# Patient Record
Sex: MALE | Race: WHITE | NOT HISPANIC OR LATINO | Employment: FULL TIME | URBAN - METROPOLITAN AREA
[De-identification: names, ages, dates, MRNs, and addresses within clinical notes are randomized per-mention and may not be internally consistent; named-entity substitution may affect disease eponyms.]

---

## 2017-01-02 ENCOUNTER — GENERIC CONVERSION - ENCOUNTER (OUTPATIENT)
Dept: OTHER | Facility: OTHER | Age: 47
End: 2017-01-02

## 2017-01-24 ENCOUNTER — ALLSCRIPTS OFFICE VISIT (OUTPATIENT)
Dept: OTHER | Facility: OTHER | Age: 47
End: 2017-01-24

## 2017-01-24 LAB
FLUAV AG SPEC QL IA: NEGATIVE
INFLUENZA B AG (HISTORICAL): NEGATIVE

## 2017-09-29 ENCOUNTER — ALLSCRIPTS OFFICE VISIT (OUTPATIENT)
Dept: OTHER | Facility: OTHER | Age: 47
End: 2017-09-29

## 2017-09-29 DIAGNOSIS — E29.1 TESTICULAR HYPOFUNCTION: ICD-10-CM

## 2017-10-02 ENCOUNTER — APPOINTMENT (OUTPATIENT)
Dept: LAB | Facility: CLINIC | Age: 47
End: 2017-10-02
Payer: COMMERCIAL

## 2017-10-02 ENCOUNTER — TRANSCRIBE ORDERS (OUTPATIENT)
Dept: LAB | Facility: CLINIC | Age: 47
End: 2017-10-02

## 2017-10-02 DIAGNOSIS — E29.1 TESTICULAR HYPOFUNCTION: ICD-10-CM

## 2017-10-02 LAB — TESTOST SERPL-MCNC: 306 NG/DL (ref 241–827)

## 2017-10-02 PROCEDURE — 84403 ASSAY OF TOTAL TESTOSTERONE: CPT

## 2017-10-02 PROCEDURE — 36415 COLL VENOUS BLD VENIPUNCTURE: CPT

## 2017-10-10 ENCOUNTER — GENERIC CONVERSION - ENCOUNTER (OUTPATIENT)
Dept: OTHER | Facility: OTHER | Age: 47
End: 2017-10-10

## 2017-11-21 ENCOUNTER — GENERIC CONVERSION - ENCOUNTER (OUTPATIENT)
Dept: OTHER | Facility: OTHER | Age: 47
End: 2017-11-21

## 2017-12-05 ENCOUNTER — GENERIC CONVERSION - ENCOUNTER (OUTPATIENT)
Dept: FAMILY MEDICINE CLINIC | Facility: CLINIC | Age: 47
End: 2017-12-05

## 2017-12-12 ENCOUNTER — ALLSCRIPTS OFFICE VISIT (OUTPATIENT)
Dept: OTHER | Facility: OTHER | Age: 47
End: 2017-12-12

## 2017-12-12 DIAGNOSIS — S39.012A STRAIN OF MUSCLE, FASCIA AND TENDON OF LOWER BACK, INITIAL ENCOUNTER: ICD-10-CM

## 2018-01-09 NOTE — RESULT NOTES
Message   b/w looks ok     Verified Results  (1) CBC/PLT/DIFF [id number] 02:57PM Gamaliel Olson Order Number: [id number]     Test Name Result Flag Reference   WBC COUNT 6 68 Thousand/uL  4 31-10 16   RBC COUNT 4 89 Million/uL  3 88-5 62   HEMOGLOBIN 14 9 g/dL  12 0-17 0   HEMATOCRIT 42 7 %  36 5-49 3   MCV 87 fL  82-98   MCH 30 5 pg  26 8-34 3   MCHC 34 9 g/dL  31 4-37 4   RDW 13 6 %  11 6-15 1   MPV 10 7 fL  8 9-12 7   PLATELET COUNT 917 Thousands/uL  149-390   nRBC AUTOMATED 0 /100 WBCs     NEUTROPHILS RELATIVE PERCENT 59 %  43-75   LYMPHOCYTES RELATIVE PERCENT 30 %  14-44   MONOCYTES RELATIVE PERCENT 8 %  4-12   EOSINOPHILS RELATIVE PERCENT 3 %  0-6   BASOPHILS RELATIVE PERCENT 0 %  0-1   NEUTROPHILS ABSOLUTE COUNT 3 91 Thousands/?L  1 85-7 62   LYMPHOCYTES ABSOLUTE COUNT 2 01 Thousands/?L  0 60-4 47   MONOCYTES ABSOLUTE COUNT 0 51 Thousand/?L  0 17-1 22   EOSINOPHILS ABSOLUTE COUNT 0 22 Thousand/?L  0 00-0 61   BASOPHILS ABSOLUTE COUNT 0 01 Thousands/?L  0 00-0 10   - Patient Instructions: This bloodwork is non-fasting  Please drink two glasses of water morning of bloodwork  (1) COMPREHENSIVE METABOLIC PANEL [id number] 25:10DQ Gamaliel Olson Order Number: [id number]     Test Name Result Flag Reference   GLUCOSE,RANDM 111 mg/dL     If the patient is fasting, the ADA then defines impaired fasting glucose as > 100 mg/dL and diabetes as > or equal to 123 mg/dL     SODIUM 142 mmol/L  136-145   POTASSIUM 4 0 mmol/L  3 5-5 3   CHLORIDE 105 mmol/L  100-108   CARBON DIOXIDE 28 mmol/L  21-32   ANION GAP (CALC) 9 mmol/L  4-13   BLOOD UREA NITROGEN 13 mg/dL  5-25   CREATININE 0 97 mg/dL  0 60-1 30   Standardized to IDMS reference method   CALCIUM 9 1 mg/dL  8 3-10 1   BILI, TOTAL 0 49 mg/dL  0 20-1 00   ALK PHOSPHATAS 81 U/L     ALT (SGPT) 37 U/L  12-78   AST(SGOT) 18 U/L  5-45   ALBUMIN 4 2 g/dL  3 5-5 0   TOTAL PROTEIN 7 6 g/dL  6 4-8 2   eGFR Non-      >60 0 ml/min/1 73sq [facility] Disease Education Program recommendations are as follows:  GFR calculation is accurate only with a steady state creatinine  Chronic Kidney disease less than 60 ml/min/1 73 sq  meters  Kidney failure less than 15 ml/min/1 73 sq  meters  (1) LIPID PANEL, FASTING 27Dec2016 02:57PM Yuri Shirley Order Number: [id number]     Test Name Result Flag Reference   CHOLESTEROL 177 mg/dL     HDL,DIRECT 48 mg/dL  40-60   Specimen collection should occur prior to Metamizole administration due to the potential for falsely depressed results  LDL CHOLESTEROL CALCULATED 92 mg/dL  0-100   - Patient Instructions: This is a fasting blood test  Water,black tea or black  coffee only after 9:00pm the night before test   Drink 2 glasses of water the morning of test       Triglyceride:         Normal              <150 mg/dl       Borderline High    150-199 mg/dl       High               200-499 mg/dl       Very High          >499 mg/dl  Cholesterol:         Desirable        <200 mg/dl      Borderline High  200-239 mg/dl      High             >239 mg/dl  HDL Cholesterol:        High    >59 mg/dL      Low     <41 mg/dL  LDL CALCULATED:    This screening LDL is a calculated result  It does not have the accuracy of the Direct Measured LDL in the monitoring of patients with hyperlipidemia and/or statin therapy  Direct Measure LDL (BKL166) must be ordered separately in these patients  TRIGLYCERIDES 184 mg/dL H <=150   Specimen collection should occur prior to N-Acetylcysteine or Metamizole administration due to the potential for falsely depressed results  (1) TSH WITH FT4 REFLEX 27Dec2016 02:57PM Yuri Shirley Order Number: [id number]     Test Name Result Flag Reference   TSH 0 793 uIU/mL  0 358-3 740   Patients undergoing fluorescein dye angiography may retain small amounts of fluorescein in the body for 48-72 hours post procedure   Samples containing fluorescein can produce falsely depressed TSH values  If the patient had this procedure,a specimen should be resubmitted post fluorescein clearance  (1) PSA (SCREEN) (Dx V76 44 Screen for Prostate Cancer) [id number] 02:57PM [facility] Order Number: [id number]     Test Name Result Flag Reference   PROSTATE SPECIFIC ANTIGEN 0 8 ng/mL  0 0-4 0   American Urological Association Guidelines define biochemical recurrence of prostate cancer as a detectable or rising PSA value post-radical prostatectomy that is greater than or equal to 0 2 ng/mL with a second confirmatory level of greater than or equal to 0 2 ng/mL  - Patient Instructions: This test is non-fasting  Please drink two glasses of water morning of bloodwork       (1) VITAMIN B12 [id number] 02:57PM Estrella Adorno Order Number: [id number]     Test Name Result Flag Reference   VITAMIN B12 674 pg/mL  100-900

## 2018-01-11 NOTE — RESULT NOTES
Message   xray and venous doppler look ok  Verified Results  VAS LOWER LIMB VENOUS DUPLEX STUDY, UNILATERAL/LIMITED [id number] 01:35PM Darinel FameCast Order Number: [id number]    - Patient Instructions: To schedule this appointment, please contact Central Scheduling at [phone]  Test Name Result Flag Reference   VAS LOWER LIMB VENOUS DUPLEX STUDY, UNILATERAL/LIMITED (Report)     [facility] REPORT   CLINICAL:   Indications: Left Limb Pain [M79 609]  Pt  complains of pain in his left outer   calf x 4-5 months that has become much worse over the past month  He has no   swelling  FINDINGS:      Left   Impression       CFV   Normal (Patent)             CONCLUSION:   Impression:   RIGHT LOWER LIMB LIMITED: NORMAL   Evaluation shows no evidence of thrombus in the common femoral vein  Doppler evaluation shows a normal response to augmentation maneuvers  LEFT LOWER LIMB: NORMAL   No evidence of acute or chronic deep vein thrombosis   No evidence of superficial thrombophlebitis noted  Doppler evaluation shows a normal response to augmentation maneuvers  Popliteal, posterior tibial and anterior tibial arterial Doppler waveforms are   triphasic  Tech Note: There is an echogenic structure located in the inguinal region  This   structure measures approximately 1 29cm and is consistent with enlarged lymph   node and channels  SIGNATURE:   Electronically Signed by: Mynor Olivas MD on 2016-12-28 05:40:44 PM     * XR TIBIA FIBULA 2 VIEW LEFT [id number] 12:08PM Darinel Winters Order Number: [id number]     Test Name Result Flag Reference   XR TIBIA FIBULA 2 VW LEFT (Report)     LEFT TIBIA AND FIBULA     INDICATION: Left tibia and fibula pain  COMPARISON: None     VIEWS: AP and lateral; 4 images     FINDINGS:     There is no acute fracture or dislocation  No degenerative changes  No lytic or blastic lesions are seen  Soft tissues are unremarkable  IMPRESSION:     No acute osseous abnormality  Workstation performed: [id number]     Signed by:   Romayne Cross Carnella Pipe, MD   12/29/16

## 2018-01-12 NOTE — MISCELLANEOUS
Message  Return to work or school:   Lotus Resendiz is under my professional care  He was seen in my office on 1/20/16   He is able to return to work on  1/20/2016      Patient off of work 1/19/2016 for appt  1/20/16  TI Samuels        Signatures   Electronically signed by : Mae Levi, ; Jan 20 2016 12:47PM EST                       (Author)

## 2018-01-13 VITALS
TEMPERATURE: 97.8 F | BODY MASS INDEX: 23.06 KG/M2 | WEIGHT: 174 LBS | HEIGHT: 73 IN | DIASTOLIC BLOOD PRESSURE: 64 MMHG | SYSTOLIC BLOOD PRESSURE: 100 MMHG

## 2018-01-13 VITALS
WEIGHT: 170 LBS | TEMPERATURE: 99.3 F | SYSTOLIC BLOOD PRESSURE: 110 MMHG | BODY MASS INDEX: 22.53 KG/M2 | DIASTOLIC BLOOD PRESSURE: 70 MMHG | HEIGHT: 73 IN

## 2018-01-13 NOTE — RESULT NOTES
Discussion/Summary   Testosterone levels in normal range       Verified Results  (1) TESTOSTERONE [id number] 09:41AM Regenia Keto Order Number: [id number]     Test Name Result Flag Reference   TESTOSTERONE 306 0 ng/dL  060-575

## 2018-01-15 NOTE — PROGRESS NOTES
Assessment    1  Pre-employment examination (V70 5) (Z02 1)    Discussion/Summary    Physical form completed  See chart copy  Chief Complaint  employment PE      History of Present Illness  HM, Adult Male: The patient is being seen for a health maintenance evaluation  General Health: The patient's health since the last visit is described as good  He has regular dental visits  He denies vision problems  He denies hearing loss  Immunizations status: up to date  Lifestyle:  He consumes a diverse and healthy diet  He does not have any weight concerns  He exercises regularly  He does not use tobacco  He denies alcohol use  He denies drug use  Screening: cancer screening reviewed and current  metabolic screening reviewed and current  risk screening reviewed and current  HPI: Patient is here for work physical  Generally feeling well  No concerns or complaints today  Currently not taking any medication  Review of Systems    Constitutional: No fever or chills, feels well, no tiredness, no recent weight gain or weight loss  Eyes: No complaints of eye pain, no red eyes, no discharge from eyes, no itchy eyes  ENT: no complaints of earache, no hearing loss, no nosebleeds, no nasal discharge, no sore throat, no hoarseness  Cardiovascular: No complaints of slow heart rate, no fast heart rate, no chest pain, no palpitations, no leg claudication, no lower extremity  Respiratory: No complaints of shortness of breath, no wheezing, no cough, no SOB on exertion, no orthopnea or PND  Gastrointestinal: No complaints of abdominal pain, no constipation, no nausea or vomiting, no diarrhea or bloody stools  Genitourinary: No complaints of dysuria, no incontinence, no hesitancy, no nocturia, no genital lesion, no testicular pain  Musculoskeletal: No complaints of arthralgia, no myalgias, no joint swelling or stiffness, no limb pain or swelling     Integumentary: No complaints of skin rash or skin lesions, no itching, no skin wound, no dry skin  Neurological: No compliants of headache, no confusion, no convulsions, no numbness or tingling, no dizziness or fainting, no limb weakness, no difficulty walking  Psychiatric: Is not suicidal, no sleep disturbances, no anxiety or depression, no change in personality, no emotional problems  Endocrine: No complaints of proptosis, no hot flashes, no muscle weakness, no erectile dysfunction, no deepening of the voice, no feelings of weakness  Hematologic/Lymphatic: No complaints of swollen glands, no swollen glands in the neck, does not bleed easily, no easy bruising  Active Problems    1  Depression (311) (F32 9)   2  Diarrhea (787 91) (R19 7)   3  Erectile dysfunction of non-organic origin (302 72) (F52 21)   4  Hand pain (729 5) (M79 643)   5  Lumbago (724 2) (M54 5)   6  Lumbar Strain (847 2)   7  Tenosynovitis Of The Wrist(S) (727 05)    Surgical History    · History of Inguinal Hernia Repair    Family History    · No pertinent family history    · Family history of Bladder cancer    · Family history of Colon Cancer (V16 0)    · Family history of Diabetes Mellitus (V18 0)    · Family history of Diabetes Mellitus (V18 0)    Social History    · Former smoker (V15 82) (A90 891)   · Quit 1999   · Marital History - Currently     Allergies    1  Penicillins    Vitals   Recorded: [id number] 08:53AM   Temperature 19 3 F   Systolic 873   Diastolic 80   Height 6 ft 1 in   Weight 176 lb    BMI Calculated 23 22   BSA Calculated 2 04     Physical Exam    Constitutional   General appearance: No acute distress, well appearing and well nourished  Eyes   Conjunctiva and lids: No swelling, erythema, or discharge  Pupils and irises: Equal, round and reactive to light  Ears, Nose, Mouth, and Throat   External inspection of ears and nose: Normal     Otoscopic examination: Tympanic membrance translucent with normal light reflex  Canals patent without erythema  Oropharynx: Normal with no erythema, edema, exudate or lesions  Pulmonary   Respiratory effort: No increased work of breathing or signs of respiratory distress  Auscultation of lungs: Clear to auscultation  Cardiovascular   Palpation of heart: Normal PMI, no thrills  Auscultation of heart: Normal rate and rhythm, normal S1 and S2, without murmurs  Examination of extremities for edema and/or varicosities: Normal     Abdomen   Abdomen: Non-tender, no masses  Liver and spleen: No hepatomegaly or splenomegaly  Lymphatic   Palpation of lymph nodes in neck: No lymphadenopathy  Musculoskeletal   Gait and station: Normal     Digits and nails: Normal without clubbing or cyanosis  Inspection/palpation of joints, bones, and muscles: Normal     Skin   Skin and subcutaneous tissue: Normal without rashes or lesions  Neurologic   Cranial nerves: Cranial nerves 2-12 intact  Reflexes: 2+ and symmetric  Sensation: No sensory loss      Psychiatric   Orientation to person, place and time: Normal     Mood and affect: Normal        Signatures   Electronically signed by : Tammy Mena DO; Jan 20 2016  9:48AM EST                       (Author)

## 2018-01-22 VITALS
TEMPERATURE: 98 F | BODY MASS INDEX: 23.33 KG/M2 | SYSTOLIC BLOOD PRESSURE: 102 MMHG | WEIGHT: 176 LBS | DIASTOLIC BLOOD PRESSURE: 68 MMHG | HEIGHT: 73 IN

## 2018-01-23 NOTE — MISCELLANEOUS
Message  Return to work or school:   Arun Pain is under my professional care  He was seen in my office on 12/12/2017   He is able to return to work on  12/14/2017      Please excuse Ebony Morales for his missed days of 12/12/2017 and 12/13/2017          Signatures   Electronically signed by : Sharon Levy MA; Dec 12 2017  2:42PM EST                       (Author)

## 2018-01-23 NOTE — MISCELLANEOUS
Message  Return to work or school:   Kirsten Mcclellan is under my professional care   He was seen in my office on 12/12/2017   He is able to return to work on  12/18/2017            Signatures   Electronically signed by : Lovely Simmons MA; Dec 13 2017 11:05AM EST                       (Author)

## 2018-04-27 DIAGNOSIS — F41.1 GENERALIZED ANXIETY DISORDER: Primary | ICD-10-CM

## 2018-04-27 RX ORDER — FLUOXETINE HYDROCHLORIDE 20 MG/1
1 CAPSULE ORAL DAILY
COMMUNITY
Start: 2015-08-18 | End: 2018-04-27 | Stop reason: SDUPTHER

## 2018-04-30 RX ORDER — FLUOXETINE HYDROCHLORIDE 20 MG/1
20 CAPSULE ORAL DAILY
Qty: 90 CAPSULE | Refills: 0 | Status: SHIPPED | OUTPATIENT
Start: 2018-04-30 | End: 2019-01-10 | Stop reason: ALTCHOICE

## 2018-05-25 ENCOUNTER — OFFICE VISIT (OUTPATIENT)
Dept: FAMILY MEDICINE CLINIC | Facility: CLINIC | Age: 48
End: 2018-05-25
Payer: COMMERCIAL

## 2018-05-25 VITALS
HEART RATE: 64 BPM | WEIGHT: 178 LBS | TEMPERATURE: 98.8 F | DIASTOLIC BLOOD PRESSURE: 78 MMHG | SYSTOLIC BLOOD PRESSURE: 116 MMHG | HEIGHT: 73 IN | BODY MASS INDEX: 23.59 KG/M2

## 2018-05-25 DIAGNOSIS — K52.9 GASTROENTERITIS: Primary | ICD-10-CM

## 2018-05-25 PROBLEM — R79.89 LOW TESTOSTERONE: Status: ACTIVE | Noted: 2017-09-29

## 2018-05-25 PROCEDURE — 99213 OFFICE O/P EST LOW 20 MIN: CPT | Performed by: FAMILY MEDICINE

## 2018-05-25 RX ORDER — CYCLOBENZAPRINE HCL 10 MG
1 TABLET ORAL AS NEEDED
COMMUNITY
Start: 2017-12-12 | End: 2018-08-02

## 2018-05-25 RX ORDER — LEVOFLOXACIN 750 MG/1
TABLET ORAL
Qty: 1 TABLET | Refills: 0 | Status: SHIPPED | OUTPATIENT
Start: 2018-05-25 | End: 2018-05-26

## 2018-05-25 RX ORDER — TADALAFIL 20 MG/1
1 TABLET ORAL
COMMUNITY
Start: 2014-01-31 | End: 2018-08-24 | Stop reason: SDUPTHER

## 2018-05-25 RX ORDER — ONDANSETRON 8 MG/1
8 TABLET, ORALLY DISINTEGRATING ORAL EVERY 8 HOURS PRN
Qty: 15 TABLET | Refills: 0 | Status: SHIPPED | OUTPATIENT
Start: 2018-05-25 | End: 2018-08-02

## 2018-05-25 NOTE — PROGRESS NOTES
Assessment/Plan:  No significant findings on physical exam   Considering duration of nausea recommended medication  Side effect profile medication reviewed  Recommend return to office for re-evaluation if no improvement or worsening symptoms  No problem-specific Assessment & Plan notes found for this encounter  Diagnoses and all orders for this visit:    Gastroenteritis  -     levofloxacin (LEVAQUIN) 750 mg tablet; Take 1 tablet as directed  -     ondansetron (ZOFRAN-ODT) 8 mg disintegrating tablet; Take 1 tablet (8 mg total) by mouth every 8 (eight) hours as needed for nausea or vomiting    Other orders  -     cyclobenzaprine (FLEXERIL) 10 mg tablet; Take 1 tablet by mouth as needed  -     tadalafil (CIALIS) 20 MG tablet; Take 1 tablet by mouth          Subjective:      Patient ID: Dee Camargo is a 50 y o  male  Patient with nausea over the last few days  He has low-grade fevers with a temp of 99 5°  He feels achy at times  No rashes  No cough  Nausea comes and goes over the last 2-3 days  He denies any travel  No diarrhea  He has occasional cramping to the epigastric area  No shortness of breath or dizziness  The following portions of the patient's history were reviewed and updated as appropriate: allergies, current medications, past family history, past medical history, past social history, past surgical history and problem list     Review of Systems   Constitutional: Negative  HENT: Negative  Eyes: Negative  Respiratory: Negative  Cardiovascular: Negative  Gastrointestinal: Positive for nausea  Endocrine: Negative  Genitourinary: Negative  Musculoskeletal: Negative  Skin: Negative  Allergic/Immunologic: Negative  Neurological: Negative  Hematological: Negative  Psychiatric/Behavioral: Negative            Objective:      /78 (BP Location: Left arm, Patient Position: Sitting, Cuff Size: Large)   Pulse 64   Temp 98 8 °F (37 1 °C) (Tympanic)  6' 1" (1 854 m)   Wt 80 7 kg (178 lb)   BMI 23 48 kg/m²          Physical Exam   Constitutional: He is oriented to person, place, and time  He appears well-developed and well-nourished  HENT:   Head: Normocephalic and atraumatic  Right Ear: External ear normal  Tympanic membrane is not erythematous and not bulging  Left Ear: External ear normal  Tympanic membrane is not erythematous and not bulging  Nose: Nose normal    Mouth/Throat: Oropharynx is clear and moist and mucous membranes are normal  No oral lesions  No oropharyngeal exudate  Eyes: Conjunctivae and EOM are normal  Right eye exhibits no discharge  Left eye exhibits no discharge  No scleral icterus  Neck: Normal range of motion  Neck supple  No thyromegaly present  Cardiovascular: Normal rate, regular rhythm and normal heart sounds  Exam reveals no gallop and no friction rub  No murmur heard  Pulmonary/Chest: Effort normal  No respiratory distress  He has no wheezes  He has no rales  He exhibits no tenderness  Abdominal: Soft  Bowel sounds are normal  He exhibits no distension and no mass  There is no tenderness  There is no rebound and no guarding  Musculoskeletal: Normal range of motion  He exhibits no edema, tenderness or deformity  Lymphadenopathy:     He has no cervical adenopathy  Neurological: He is alert and oriented to person, place, and time  He has normal reflexes  No cranial nerve deficit  He exhibits normal muscle tone  Coordination normal    Skin: Skin is warm and dry  No rash noted  No erythema  No pallor  Psychiatric: He has a normal mood and affect  His behavior is normal    Vitals reviewed

## 2018-06-28 ENCOUNTER — OFFICE VISIT (OUTPATIENT)
Dept: FAMILY MEDICINE CLINIC | Facility: CLINIC | Age: 48
End: 2018-06-28
Payer: COMMERCIAL

## 2018-06-28 VITALS
WEIGHT: 181 LBS | SYSTOLIC BLOOD PRESSURE: 104 MMHG | TEMPERATURE: 100.3 F | HEIGHT: 73 IN | BODY MASS INDEX: 23.99 KG/M2 | DIASTOLIC BLOOD PRESSURE: 70 MMHG

## 2018-06-28 DIAGNOSIS — J01.00 ACUTE NON-RECURRENT MAXILLARY SINUSITIS: Primary | ICD-10-CM

## 2018-06-28 PROCEDURE — 99213 OFFICE O/P EST LOW 20 MIN: CPT | Performed by: FAMILY MEDICINE

## 2018-06-28 RX ORDER — DOXYCYCLINE HYCLATE 100 MG/1
100 CAPSULE ORAL EVERY 12 HOURS SCHEDULED
Qty: 14 CAPSULE | Refills: 0 | Status: SHIPPED | OUTPATIENT
Start: 2018-06-28 | End: 2018-07-05

## 2018-06-28 NOTE — LETTER
June 28, 2018     Patient: Miriam Brannon   YOB: 1970   Date of Visit: 6/28/2018       To Whom it May Concern:    Madeline Scott is under my professional care  He was seen in my office on 6/28/2018  He may return to work on Monday, July 2, 2018  If you have any questions or concerns, please don't hesitate to call           Sincerely,          Radha Baxter DO        CC: No Recipients

## 2018-06-28 NOTE — PROGRESS NOTES
Assessment/Plan:  Side effect profile medication reviewed  Recommend return to office if no improvement or worsening symptoms  He will call if any new symptoms develop  No problem-specific Assessment & Plan notes found for this encounter  Diagnoses and all orders for this visit:    Acute non-recurrent maxillary sinusitis  -     doxycycline hyclate (VIBRAMYCIN) 100 mg capsule; Take 1 capsule (100 mg total) by mouth every 12 (twelve) hours for 7 days          Subjective:      Patient ID: Juan Pablo Ramos is a 50 y o  male  Patient here with sinus pressure and congestion over the last 1 week  No cough  No fevers  No GI complaints  He is taking over-the-counter decongestants without much improvement in symptoms  The following portions of the patient's history were reviewed and updated as appropriate: allergies, current medications, past family history, past medical history, past social history, past surgical history and problem list     Review of Systems   Constitutional: Negative  HENT: Negative  Eyes: Negative  Respiratory: Negative  Cardiovascular: Negative  Gastrointestinal: Negative  Endocrine: Negative  Genitourinary: Negative  Musculoskeletal: Negative  Skin: Negative  Allergic/Immunologic: Negative  Neurological: Negative  Hematological: Negative  Psychiatric/Behavioral: Negative  Objective:      /70   Temp 100 3 °F (37 9 °C)   Ht 6' 0 64" (1 845 m)   Wt 82 1 kg (181 lb)   BMI 24 12 kg/m²          Physical Exam   Constitutional: He is oriented to person, place, and time  He appears well-developed and well-nourished  HENT:   Head: Normocephalic and atraumatic  Right Ear: External ear normal  Tympanic membrane is not erythematous and not bulging  Left Ear: External ear normal  Tympanic membrane is not erythematous and not bulging     Nose: Nose normal    Mouth/Throat: Oropharynx is clear and moist and mucous membranes are normal  No oral lesions  No oropharyngeal exudate  Eyes: Conjunctivae and EOM are normal  Right eye exhibits no discharge  Left eye exhibits no discharge  No scleral icterus  Neck: Normal range of motion  Neck supple  No thyromegaly present  Cardiovascular: Normal rate, regular rhythm and normal heart sounds  Exam reveals no gallop and no friction rub  No murmur heard  Pulmonary/Chest: Effort normal  No respiratory distress  He has no wheezes  He has no rales  He exhibits no tenderness  Abdominal: Soft  Bowel sounds are normal  He exhibits no distension and no mass  There is no tenderness  There is no rebound and no guarding  Musculoskeletal: Normal range of motion  He exhibits no edema, tenderness or deformity  Lymphadenopathy:     He has no cervical adenopathy  Neurological: He is alert and oriented to person, place, and time  He has normal reflexes  No cranial nerve deficit  He exhibits normal muscle tone  Coordination normal    Skin: Skin is warm and dry  No rash noted  No erythema  No pallor  Psychiatric: He has a normal mood and affect  His behavior is normal    Vitals reviewed

## 2018-07-05 ENCOUNTER — OFFICE VISIT (OUTPATIENT)
Dept: FAMILY MEDICINE CLINIC | Facility: CLINIC | Age: 48
End: 2018-07-05
Payer: COMMERCIAL

## 2018-07-05 VITALS
TEMPERATURE: 98.1 F | BODY MASS INDEX: 23.99 KG/M2 | SYSTOLIC BLOOD PRESSURE: 108 MMHG | HEIGHT: 73 IN | WEIGHT: 181 LBS | DIASTOLIC BLOOD PRESSURE: 72 MMHG

## 2018-07-05 DIAGNOSIS — R53.83 FATIGUE, UNSPECIFIED TYPE: Primary | ICD-10-CM

## 2018-07-05 DIAGNOSIS — R79.89 LOW TESTOSTERONE: ICD-10-CM

## 2018-07-05 LAB
ALBUMIN SERPL BCP-MCNC: 3.7 G/DL (ref 3.5–5)
ALP SERPL-CCNC: 64 U/L (ref 46–116)
ALT SERPL W P-5'-P-CCNC: 29 U/L (ref 12–78)
ANION GAP SERPL CALCULATED.3IONS-SCNC: 6 MMOL/L (ref 4–13)
AST SERPL W P-5'-P-CCNC: 15 U/L (ref 5–45)
BASOPHILS # BLD AUTO: 0.02 THOUSANDS/ΜL (ref 0–0.1)
BASOPHILS NFR BLD AUTO: 0 % (ref 0–1)
BILIRUB SERPL-MCNC: 0.65 MG/DL (ref 0.2–1)
BUN SERPL-MCNC: 15 MG/DL (ref 5–25)
CALCIUM SERPL-MCNC: 8.7 MG/DL (ref 8.3–10.1)
CHLORIDE SERPL-SCNC: 108 MMOL/L (ref 100–108)
CHOLEST SERPL-MCNC: 145 MG/DL (ref 50–200)
CO2 SERPL-SCNC: 28 MMOL/L (ref 21–32)
CREAT SERPL-MCNC: 0.89 MG/DL (ref 0.6–1.3)
EOSINOPHIL # BLD AUTO: 0.11 THOUSAND/ΜL (ref 0–0.61)
EOSINOPHIL NFR BLD AUTO: 2 % (ref 0–6)
ERYTHROCYTE [DISTWIDTH] IN BLOOD BY AUTOMATED COUNT: 13.9 % (ref 11.6–15.1)
GFR SERPL CREATININE-BSD FRML MDRD: 100 ML/MIN/1.73SQ M
GLUCOSE SERPL-MCNC: 88 MG/DL (ref 65–140)
HCT VFR BLD AUTO: 38.1 % (ref 36.5–49.3)
HDLC SERPL-MCNC: 50 MG/DL (ref 40–60)
HGB BLD-MCNC: 12.5 G/DL (ref 12–17)
IMM GRANULOCYTES # BLD AUTO: 0.02 THOUSAND/UL (ref 0–0.2)
IMM GRANULOCYTES NFR BLD AUTO: 0 % (ref 0–2)
LDLC SERPL CALC-MCNC: 82 MG/DL (ref 0–100)
LYMPHOCYTES # BLD AUTO: 1.49 THOUSANDS/ΜL (ref 0.6–4.47)
LYMPHOCYTES NFR BLD AUTO: 27 % (ref 14–44)
MCH RBC QN AUTO: 29.4 PG (ref 26.8–34.3)
MCHC RBC AUTO-ENTMCNC: 32.8 G/DL (ref 31.4–37.4)
MCV RBC AUTO: 90 FL (ref 82–98)
MONOCYTES # BLD AUTO: 0.43 THOUSAND/ΜL (ref 0.17–1.22)
MONOCYTES NFR BLD AUTO: 8 % (ref 4–12)
NEUTROPHILS # BLD AUTO: 3.48 THOUSANDS/ΜL (ref 1.85–7.62)
NEUTS SEG NFR BLD AUTO: 63 % (ref 43–75)
NRBC BLD AUTO-RTO: 0 /100 WBCS
PLATELET # BLD AUTO: 207 THOUSANDS/UL (ref 149–390)
PMV BLD AUTO: 10.7 FL (ref 8.9–12.7)
POTASSIUM SERPL-SCNC: 4.3 MMOL/L (ref 3.5–5.3)
PROT SERPL-MCNC: 7.2 G/DL (ref 6.4–8.2)
PSA SERPL-MCNC: 2.3 NG/ML (ref 0–4)
RBC # BLD AUTO: 4.25 MILLION/UL (ref 3.88–5.62)
SODIUM SERPL-SCNC: 142 MMOL/L (ref 136–145)
TESTOST SERPL-MCNC: 429 NG/DL (ref 113–1065)
TRIGL SERPL-MCNC: 66 MG/DL
WBC # BLD AUTO: 5.55 THOUSAND/UL (ref 4.31–10.16)

## 2018-07-05 PROCEDURE — 84153 ASSAY OF PSA TOTAL: CPT | Performed by: FAMILY MEDICINE

## 2018-07-05 PROCEDURE — 86618 LYME DISEASE ANTIBODY: CPT | Performed by: FAMILY MEDICINE

## 2018-07-05 PROCEDURE — 99214 OFFICE O/P EST MOD 30 MIN: CPT | Performed by: FAMILY MEDICINE

## 2018-07-05 PROCEDURE — 36415 COLL VENOUS BLD VENIPUNCTURE: CPT | Performed by: FAMILY MEDICINE

## 2018-07-05 PROCEDURE — 85025 COMPLETE CBC W/AUTO DIFF WBC: CPT | Performed by: FAMILY MEDICINE

## 2018-07-05 PROCEDURE — 80061 LIPID PANEL: CPT | Performed by: FAMILY MEDICINE

## 2018-07-05 PROCEDURE — 86617 LYME DISEASE ANTIBODY: CPT | Performed by: FAMILY MEDICINE

## 2018-07-05 PROCEDURE — 80053 COMPREHEN METABOLIC PANEL: CPT | Performed by: FAMILY MEDICINE

## 2018-07-05 PROCEDURE — 84403 ASSAY OF TOTAL TESTOSTERONE: CPT | Performed by: FAMILY MEDICINE

## 2018-07-05 NOTE — PROGRESS NOTES
Assessment/Plan:  No significant findings on physical exam today  Recommended blood testing today  Await results  Treat if positive  Consider testosterone supplementation if he continues to have continued low testosterone  He will call with any new or worsening symptoms in the interim  No problem-specific Assessment & Plan notes found for this encounter  Diagnoses and all orders for this visit:    Fatigue, unspecified type  -     CBC and differential  -     Comprehensive metabolic panel  -     PSA Total, Diagnostic  -     Lipid Panel with Direct LDL reflex  -     Testosterone; Future  -     Lyme Antibody Profile with reflex to WB; Future  -     Testosterone  -     Lyme Antibody Profile with reflex to WB    Low testosterone  -     CBC and differential  -     Comprehensive metabolic panel  -     PSA Total, Diagnostic  -     Lipid Panel with Direct LDL reflex  -     Testosterone; Future  -     Testosterone          Subjective:      Patient ID: Marylee Loh is a 50 y o  male  Patient is here for chronic fatigue over the last several months  He states he is napping at times during the course of the day  He states he is getting about 8 hr of sleep and does feel well rested when getting up  He would like to be tested for Lyme disease and have his testosterone level checked  He does have borderline low testosterone in the past with most recent testosterone level in the low 300s  He does note diminished libido but no difficulty with erection  Denies any chest pain shortness of breath or fevers  No arthralgias  Fatigue   Associated symptoms include fatigue  The following portions of the patient's history were reviewed and updated as appropriate: allergies, current medications, past family history, past medical history, past social history, past surgical history and problem list     Review of Systems   Constitutional: Positive for fatigue  HENT: Negative  Eyes: Negative      Respiratory: Negative  Cardiovascular: Negative  Gastrointestinal: Negative  Endocrine: Negative  Genitourinary: Negative  Musculoskeletal: Negative  Skin: Negative  Allergic/Immunologic: Negative  Neurological: Negative  Hematological: Negative  Psychiatric/Behavioral: Negative  Objective:      /72   Temp 98 1 °F (36 7 °C)   Ht 6' 0 83" (1 85 m)   Wt 82 1 kg (181 lb)   BMI 23 99 kg/m²          Physical Exam   Constitutional: He is oriented to person, place, and time  He appears well-developed and well-nourished  HENT:   Head: Normocephalic and atraumatic  Right Ear: External ear normal  Tympanic membrane is not erythematous and not bulging  Left Ear: External ear normal  Tympanic membrane is not erythematous and not bulging  Nose: Nose normal    Mouth/Throat: Oropharynx is clear and moist and mucous membranes are normal  No oral lesions  No oropharyngeal exudate  Eyes: Conjunctivae and EOM are normal  Right eye exhibits no discharge  Left eye exhibits no discharge  No scleral icterus  Neck: Normal range of motion  Neck supple  No thyromegaly present  Cardiovascular: Normal rate, regular rhythm and normal heart sounds  Exam reveals no gallop and no friction rub  No murmur heard  Pulmonary/Chest: Effort normal  No respiratory distress  He has no wheezes  He has no rales  He exhibits no tenderness  Abdominal: Soft  Bowel sounds are normal  He exhibits no distension and no mass  There is no tenderness  There is no rebound and no guarding  Musculoskeletal: Normal range of motion  He exhibits no edema, tenderness or deformity  Lymphadenopathy:     He has no cervical adenopathy  Neurological: He is alert and oriented to person, place, and time  He has normal reflexes  No cranial nerve deficit  He exhibits normal muscle tone  Coordination normal    Skin: Skin is warm and dry  No rash noted  No erythema  No pallor  Psychiatric: He has a normal mood and affect  His behavior is normal    Vitals reviewed

## 2018-07-06 LAB
B BURGDOR IGG SER IA-ACNC: 0.07
B BURGDOR IGM SER IA-ACNC: 1.12

## 2018-07-08 LAB
B BURGDOR IGG PATRN SER IB-IMP: NEGATIVE
B BURGDOR IGM PATRN SER IB-IMP: NEGATIVE
B BURGDOR18KD IGG SER QL IB: NORMAL
B BURGDOR23KD IGG SER QL IB: NORMAL
B BURGDOR23KD IGM SER QL IB: NORMAL
B BURGDOR28KD IGG SER QL IB: NORMAL
B BURGDOR30KD IGG SER QL IB: NORMAL
B BURGDOR39KD IGG SER QL IB: NORMAL
B BURGDOR39KD IGM SER QL IB: NORMAL
B BURGDOR41KD IGG SER QL IB: NORMAL
B BURGDOR41KD IGM SER QL IB: NORMAL
B BURGDOR45KD IGG SER QL IB: NORMAL
B BURGDOR58KD IGG SER QL IB: NORMAL
B BURGDOR66KD IGG SER QL IB: NORMAL
B BURGDOR93KD IGG SER QL IB: NORMAL

## 2018-08-02 ENCOUNTER — OFFICE VISIT (OUTPATIENT)
Dept: FAMILY MEDICINE CLINIC | Facility: CLINIC | Age: 48
End: 2018-08-02
Payer: COMMERCIAL

## 2018-08-02 VITALS
TEMPERATURE: 98.3 F | HEIGHT: 69 IN | OXYGEN SATURATION: 97 % | BODY MASS INDEX: 26.07 KG/M2 | WEIGHT: 176 LBS | HEART RATE: 56 BPM

## 2018-08-02 DIAGNOSIS — S16.1XXA STRAIN OF NECK MUSCLE, INITIAL ENCOUNTER: Primary | ICD-10-CM

## 2018-08-02 PROCEDURE — 3008F BODY MASS INDEX DOCD: CPT | Performed by: FAMILY MEDICINE

## 2018-08-02 PROCEDURE — 1036F TOBACCO NON-USER: CPT | Performed by: FAMILY MEDICINE

## 2018-08-02 PROCEDURE — 99213 OFFICE O/P EST LOW 20 MIN: CPT | Performed by: FAMILY MEDICINE

## 2018-08-02 RX ORDER — MELOXICAM 15 MG/1
15 TABLET ORAL DAILY
Qty: 30 TABLET | Refills: 0 | Status: SHIPPED | OUTPATIENT
Start: 2018-08-02 | End: 2019-01-10 | Stop reason: ALTCHOICE

## 2018-08-02 RX ORDER — CYCLOBENZAPRINE HCL 10 MG
10 TABLET ORAL
Qty: 30 TABLET | Refills: 0 | Status: SHIPPED | OUTPATIENT
Start: 2018-08-02 | End: 2019-01-10 | Stop reason: ALTCHOICE

## 2018-08-02 NOTE — LETTER
August 2, 2018     Patient: Marylee Loh   YOB: 1970   Date of Visit: 8/2/2018       To Whom it May Concern:    Sandrita Garcia is under my professional care  He was seen in my office on 8/2/2018  He may return to work on 8/6/2018  Please excuse for 8/3/2018  If you have any questions or concerns, please don't hesitate to call           Sincerely,          Tip Ashraf DO        CC: No Recipients

## 2018-08-02 NOTE — PROGRESS NOTES
Assessment/Plan:  No significant findings on physical exam today  Recommended physical therapy for neck strain symptoms  Consider x-ray if no improvement  No problem-specific Assessment & Plan notes found for this encounter  Diagnoses and all orders for this visit:    Strain of neck muscle, initial encounter  -     Ambulatory referral to Physical Therapy; Future  -     meloxicam (MOBIC) 15 mg tablet; Take 1 tablet (15 mg total) by mouth daily  -     cyclobenzaprine (FLEXERIL) 10 mg tablet; Take 1 tablet (10 mg total) by mouth daily at bedtime for 30 days          Subjective:      Patient ID: Eleuterio Lau is a 46 y o  male  Patient with cervical strain symptoms over the last several days  Symptoms are mild-to-moderate  He has pain to the trapezius area bilaterally  Neck Pain          The following portions of the patient's history were reviewed and updated as appropriate: allergies, current medications, past family history, past medical history, past social history, past surgical history and problem list     Review of Systems   Constitutional: Negative  HENT: Negative  Eyes: Negative  Respiratory: Negative  Cardiovascular: Negative  Gastrointestinal: Negative  Endocrine: Negative  Genitourinary: Negative  Musculoskeletal: Positive for neck pain  Skin: Negative  Allergic/Immunologic: Negative  Neurological: Negative  Hematological: Negative  Psychiatric/Behavioral: Negative  Objective:      Pulse 56   Temp 98 3 °F (36 8 °C) (Tympanic)   Ht 5' 9 29" (1 76 m)   Wt 79 8 kg (176 lb)   SpO2 97%   BMI 25 77 kg/m²          Physical Exam   Constitutional: He is oriented to person, place, and time  He appears well-developed and well-nourished  HENT:   Head: Normocephalic and atraumatic  Right Ear: External ear normal  Tympanic membrane is not erythematous and not bulging     Left Ear: External ear normal  Tympanic membrane is not erythematous and not bulging  Nose: Nose normal    Mouth/Throat: Oropharynx is clear and moist and mucous membranes are normal  No oral lesions  No oropharyngeal exudate  Eyes: Conjunctivae and EOM are normal  Right eye exhibits no discharge  Left eye exhibits no discharge  No scleral icterus  Neck: Normal range of motion  Neck supple  No thyromegaly present  Cardiovascular: Normal rate, regular rhythm and normal heart sounds  Exam reveals no gallop and no friction rub  No murmur heard  Pulmonary/Chest: Effort normal  No respiratory distress  He has no wheezes  He has no rales  He exhibits no tenderness  Abdominal: Soft  Bowel sounds are normal  He exhibits no distension and no mass  There is no tenderness  There is no rebound and no guarding  Musculoskeletal: Normal range of motion  He exhibits no edema, tenderness or deformity  Lymphadenopathy:     He has no cervical adenopathy  Neurological: He is alert and oriented to person, place, and time  He has normal reflexes  No cranial nerve deficit  He exhibits normal muscle tone  Coordination normal    Skin: Skin is warm and dry  No rash noted  No erythema  No pallor  Psychiatric: He has a normal mood and affect  His behavior is normal    Vitals reviewed

## 2018-08-17 ENCOUNTER — TRANSCRIBE ORDERS (OUTPATIENT)
Dept: RADIOLOGY | Facility: CLINIC | Age: 48
End: 2018-08-17

## 2018-08-17 ENCOUNTER — APPOINTMENT (OUTPATIENT)
Dept: RADIOLOGY | Facility: CLINIC | Age: 48
End: 2018-08-17
Payer: COMMERCIAL

## 2018-08-17 DIAGNOSIS — M54.2 CERVICALGIA: ICD-10-CM

## 2018-08-17 DIAGNOSIS — M54.2 CERVICALGIA: Primary | ICD-10-CM

## 2018-08-17 PROCEDURE — 72050 X-RAY EXAM NECK SPINE 4/5VWS: CPT

## 2018-08-24 DIAGNOSIS — N52.9 ERECTILE DYSFUNCTION, UNSPECIFIED ERECTILE DYSFUNCTION TYPE: Primary | ICD-10-CM

## 2018-08-25 RX ORDER — TADALAFIL 20 MG
TABLET ORAL
Qty: 6 TABLET | Refills: 4 | Status: SHIPPED | OUTPATIENT
Start: 2018-08-25 | End: 2019-01-10 | Stop reason: ALTCHOICE

## 2018-09-17 ENCOUNTER — TRANSCRIBE ORDERS (OUTPATIENT)
Dept: RADIOLOGY | Facility: CLINIC | Age: 48
End: 2018-09-17

## 2018-09-17 ENCOUNTER — APPOINTMENT (OUTPATIENT)
Dept: RADIOLOGY | Facility: CLINIC | Age: 48
End: 2018-09-17
Payer: COMMERCIAL

## 2018-09-17 DIAGNOSIS — M54.6 THORACIC BACK PAIN, UNSPECIFIED BACK PAIN LATERALITY, UNSPECIFIED CHRONICITY: Primary | ICD-10-CM

## 2018-09-17 DIAGNOSIS — M54.6 THORACIC BACK PAIN, UNSPECIFIED BACK PAIN LATERALITY, UNSPECIFIED CHRONICITY: ICD-10-CM

## 2018-09-17 DIAGNOSIS — M89.8X1 PAIN OF RIGHT SCAPULA: ICD-10-CM

## 2018-09-17 PROCEDURE — 72072 X-RAY EXAM THORAC SPINE 3VWS: CPT

## 2018-10-23 ENCOUNTER — EVALUATION (OUTPATIENT)
Dept: OCCUPATIONAL THERAPY | Facility: CLINIC | Age: 48
End: 2018-10-23
Payer: COMMERCIAL

## 2018-10-23 DIAGNOSIS — M25.511 RIGHT SHOULDER PAIN, UNSPECIFIED CHRONICITY: Primary | ICD-10-CM

## 2018-10-23 PROCEDURE — 97166 OT EVAL MOD COMPLEX 45 MIN: CPT

## 2018-10-23 PROCEDURE — 97110 THERAPEUTIC EXERCISES: CPT

## 2018-10-23 PROCEDURE — G8991 OTHER PT/OT GOAL STATUS: HCPCS

## 2018-10-23 PROCEDURE — G8990 OTHER PT/OT CURRENT STATUS: HCPCS

## 2018-10-23 NOTE — PROGRESS NOTES
OT Evaluation     Today's date: 10/23/2018  Patient name: Delroy Barriga  : 1970  MRN: [id number]  Referring provider: Lilliana Lopez MD  Dx:   Encounter Diagnosis     ICD-10-CM    1  Right shoulder pain, unspecified chronicity M25 511                   Assessment  Impairments: abnormal or restricted ROM and activity intolerance    Assessment details: Delroy Barriga is a 46 y o  male with a diagnosis of right shoulder pain  A moderatecomplexity evaluation was completed today due to fluctuating pain and a 53% FOTO score  Findings show an impairment with ROM, strength, pain, and reaching overhead for activities at work  He works full-time as a  at a school  He reports no difficulty with lifting and completing work duties close to his body  Patient will benefit from OT services to reach goals  Understanding of Dx/Px/POC: good   Prognosis: good    Goals  STG's In 2 weeks:  1  Patient will report a decreased pain level of 1-2/10 in right upper trap and shoulder  2  Patient will improve AROM of right shoulder FF/ABD to 120, IR to 50 and horizontal adduction to 110   3   Patient will increase strength of right shoulder FF,ABD, and ER to 4/5 for lifting and carrying items  LTG's In 4 weeks:  1  Patient will report a decreased pain level of 0/10   2  Patient will increase strength of right shoulder FF, ABD, and ER to 5/5 for lifting and carrying items  3  Patient will improve AROM of right shoulder FF/ABD to 155, extension to 65, IR to 70, ER to 90, and hor adduction to 125 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      Plan  Patient would benefit from: OT eval and skilled occupational therapy  Planned modality interventions: thermotherapy: hydrocollator packs, cryotherapy and ultrasound  Planned therapy interventions: home exercise program, therapeutic exercise, patient education, joint mobilization, manual therapy, strengthening and stretching  Frequency: 2x week  Duration in visits: 8  Duration in weeks: 4  Plan of Care beginning date: 10/23/2018  Plan of Care expiration date: 2018  Treatment plan discussed with: patient        Subjective Evaluation    History of Present Illness  Date of onset: 7/15/2018  Mechanism of injury: Patient has been having pain in upper trap and right shoulder for the past few months  Patient was treated at the chiropractor for 6 weeks with minimal relief  He consulted the [facility] due to continued symptoms  X rays and MRI were done which were negative  Patient has f/u appointment on 18    Quality of life: good    Pain  Current pain ratin  At best pain ratin  At worst pain ratin  Quality: burning and throbbing  Relieving factors: medications and rest  Aggravating factors: lifting and walking    Social Support  Steps to enter house: yes  Stairs in house: yes   L[facility] in: [facility]  Lives with: spouse    Employment status: working  Hand dominance: right      Diagnostic Tests  X-ray: normal  MRI studies: normal  Treatments  Previous treatment: chiropractic  Current treatment: occupational therapy  Patient Goals  Patient goals for therapy: increased strength, decreased pain, increased motion and return to sport/leisure activities          Objective     Active Range of Motion   Left Shoulder   Flexion: 155 degrees   Extension: 62 degrees   Abduction: 155 degrees   External rotation 90°: 90 degrees   Internal rotation 90°: 76 degrees   Horizontal abduction: 120 degrees     Right Shoulder   Flexion: 110 degrees   Extension: 60 degrees   Abduction: 85 degrees   External rotation 90°: 85 degrees  Internal rotation 90°: 44 degrees   Horizontal abduction: 109 degrees     Additional Active Range of Motion Details  Minimal difficulty with scapula elevation    Strength/Myotome Testing     Left Shoulder     Planes of Motion   Flexion: 5   Extension: 5   Abduction: 5   Adduction: 5   External rotation at 0°: 5   Internal rotation at 0°: 5   Horizontal abduction: 5   Horizontal adduction: 5     Isolated Muscles   Triceps: 5     Right Shoulder     Planes of Motion   Flexion: 4-   Extension: 4-   Abduction: 3+   External rotation at 0°: 4   Internal rotation at 0°: 5   Horizontal abduction: 4-   Horizontal adduction: 5     Isolated Muscles   Biceps: 4   Triceps: 4     Additional Strength Details  Right  115#  Left  100#      Daily Treatment Diary     Manual  10/23/18       PROM shoulder all planes                                            Exercise Diary  10/23/18       scap elevation, depression, retraction 5  5  5       Wall slides  FF  scaption Hold 10 sec    3  3       Upper Trap stretch with right arm at IR Hold 15 sec       pulleys        ER stretch at door        AAROM with cane   FF  ABD  EXT  IR        UBC        Posterior capsule stretch                                                                                                            Modalities        Ultrasound right upper trap        Hot pack

## 2018-10-23 NOTE — LETTER
2018    Alexi Zuniga MD  [facility]    Patient: Darryle Gone   YOB: 1970   Date of Visit: 10/23/2018     Encounter Diagnosis     ICD-10-CM    1  Right shoulder pain, unspecified chronicity M25 511        Dear Dr Oswaldo Nagel:    Please review the attached Plan of Care from [facility] recent visit  Please verify that you agree therapy should continue by signing the attached document and sending it back to our office  If you have any questions or concerns, please don't hesitate to call  Sincerely,    Nolan Cannon OT      Referring Provider:     I certify that I have read the below Plan of Care and certify the need for these services furnished under this plan of treatment while under my care  Alexi Zuniga MD  [address] [place] [address]: [phone]        OT Evaluation     Today's date: 10/23/2018  Patient name: Darryle Gone  : 1970  MRN: [id number]  Referring provider: Alberto Argueta MD  Dx:   Encounter Diagnosis     ICD-10-CM    1  Right shoulder pain, unspecified chronicity M25 511                   Assessment  Impairments: abnormal or restricted ROM and activity intolerance    Assessment details: Darryle Gone is a 46 y o  male with a diagnosis of right shoulder pain  A moderatecomplexity evaluation was completed today due to fluctuating pain and a 53% FOTO score  Findings show an impairment with ROM, strength, pain, and reaching overhead for activities at work  He works full-time as a  at a school  He reports no difficulty with lifting and completing work duties close to his body  Patient will benefit from OT services to reach goals  Understanding of Dx/Px/POC: good   Prognosis: good    Goals  STG's In 2 weeks:  1  Patient will report a decreased pain level of 1-2/10 in right upper trap and shoulder    2  Patient will improve AROM of right shoulder FF/ABD to 120, IR to 50 and horizontal adduction to 110   3   Patient will increase strength of right shoulder FF,ABD, and ER to 4/5 for lifting and carrying items  LTG's In 4 weeks:  1  Patient will report a decreased pain level of 0/10   2  Patient will increase strength of right shoulder FF, ABD, and ER to 5/5 for lifting and carrying items  3  Patient will improve AROM of right shoulder FF/ABD to 155, extension to 65, IR to 70, ER to 90, and hor adduction to 125 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  Plan  Patient would benefit from: OT eval and skilled occupational therapy  Planned modality interventions: thermotherapy: hydrocollator packs, cryotherapy and ultrasound  Planned therapy interventions: home exercise program, therapeutic exercise, patient education, joint mobilization, manual therapy, strengthening and stretching  Frequency: 2x week  Duration in visits: 8  Duration in weeks: 4  Plan of Care beginning date: 10/23/2018  Plan of Care expiration date: 2018  Treatment plan discussed with: patient        Subjective Evaluation    History of Present Illness  Date of onset: 7/15/2018  Mechanism of injury: Patient has been having pain in upper trap and right shoulder for the past few months  Patient was treated at the chiropractor for 6 weeks with minimal relief  He consulted the [facility] due to continued symptoms  X rays and MRI were done which were negative  Patient has f/u appointment on 18    Quality of life: good    Pain  Current pain ratin  At best pain ratin  At worst pain ratin  Quality: burning and throbbing  Relieving factors: medications and rest  Aggravating factors: lifting and walking    Social Support  Steps to enter house: yes  Stairs in house: yes   Lives in: [place] house  Lives with: spouse    Employment status: working  Hand dominance: right      Diagnostic Tests  X-ray: normal  MRI studies: normal  Treatments  Previous treatment: chiropractic  Current treatment: occupational therapy  Patient Goals  Patient goals for therapy: increased strength, decreased pain, increased motion and return to sport/leisure activities          Objective     Active Range of Motion   Left Shoulder   Flexion: 155 degrees   Extension: 62 degrees   Abduction: 155 degrees   External rotation 90°:  90 degrees   Internal rotation 90°:  76 degrees   Horizontal abduction: 120 degrees     Right Shoulder   Flexion: 110 degrees   Extension: 60 degrees   Abduction: 85 degrees   External rotation 90°:  85 degrees  Internal rotation 90°:  44 degrees   Horizontal abduction: 109 degrees     Additional Active Range of Motion Details  Minimal difficulty with scapula elevation    Strength/Myotome Testing     Left Shoulder     Planes of Motion   Flexion: 5   Extension: 5   Abduction: 5   Adduction: 5   External rotation at 0°:  5   Internal rotation at 0°:  5   Horizontal abduction: 5   Horizontal adduction: 5     Isolated Muscles   Triceps: 5     Right Shoulder     Planes of Motion   Flexion: 4-   Extension: 4-   Abduction: 3+   External rotation at 0°:  4   Internal rotation at 0°:  5   Horizontal abduction: 4-   Horizontal adduction: 5     Isolated Muscles   Biceps: 4   Triceps: 4     Additional Strength Details  Right  115#  Left  100#      Daily Treatment Diary     Manual  10/23/18       PROM shoulder all planes                                            Exercise Diary  10/23/18       scap elevation, depression, retraction 5  5  5       Wall slides  FF  scaption Hold 10 sec    3  3       Upper Trap stretch with right arm at IR Hold 15 sec       pulleys        ER stretch at door        AAROM with cane   FF  ABD  EXT  IR        UBC        Posterior capsule stretch                                                                                                            Modalities        Ultrasound right upper trap        Hot pack

## 2018-10-26 ENCOUNTER — TRANSCRIBE ORDERS (OUTPATIENT)
Dept: PHYSICAL THERAPY | Facility: CLINIC | Age: 48
End: 2018-10-26

## 2018-10-26 DIAGNOSIS — G89.29 CHRONIC RIGHT SHOULDER PAIN: Primary | ICD-10-CM

## 2018-10-26 DIAGNOSIS — M25.511 CHRONIC RIGHT SHOULDER PAIN: Primary | ICD-10-CM

## 2018-10-30 ENCOUNTER — OFFICE VISIT (OUTPATIENT)
Dept: OCCUPATIONAL THERAPY | Facility: CLINIC | Age: 48
End: 2018-10-30
Payer: COMMERCIAL

## 2018-10-30 DIAGNOSIS — M25.511 RIGHT SHOULDER PAIN, UNSPECIFIED CHRONICITY: Primary | ICD-10-CM

## 2018-10-30 PROCEDURE — 97035 APP MDLTY 1+ULTRASOUND EA 15: CPT

## 2018-10-30 PROCEDURE — 97140 MANUAL THERAPY 1/> REGIONS: CPT

## 2018-10-30 PROCEDURE — 97110 THERAPEUTIC EXERCISES: CPT

## 2018-10-30 NOTE — PROGRESS NOTES
Daily Note     Today's date: 10/30/2018  Patient name: Mich Barba  : 1970  MRN: [id number]  Referring provider: Luis Ahumada MD  Dx:   Encounter Diagnosis     ICD-10-CM    1  Right shoulder pain, unspecified chronicity M25 511                   Subjective: It just feels tight  Objective:   Manual  10/23/18  10/30/18         PROM shoulder all planes    20 min                                                                       Exercise Diary  10/23/18 10/30/18          scap elevation, depression, retraction 5  5  5 15   5  15         Wall slides  FF  scaption Hold 10 sec  3  3           Upper Trap stretch with right arm at IR Hold 15 sec           pulleys  x10  x10 ea way         ER stretch at door             AAROM with cane   FF  ABD  EXT  IR        10  10         UBC    6 min 80 resistance         Posterior capsule stretch    x5          IR towel stretch    10 sec hold x5          IR wall stretch    10 sec hold x5         penedulums    3# x30                                                                                                                                             Modalities   10/30/18          Ultrasound right upper trap    10 min         Hot pack                                       Assessment: Tolerated treatment fair  Patient demonstrated fatigue post treatment and would benefit from continued OT  End range shoulder tightness noted all directions  Winging of R scapula noted  IR tighter than ER  Reports shoulder felt a little better post tx  Plan: Continue per plan of care  Progress treatment as tolerated

## 2018-11-02 ENCOUNTER — OFFICE VISIT (OUTPATIENT)
Dept: OCCUPATIONAL THERAPY | Facility: CLINIC | Age: 48
End: 2018-11-02
Payer: COMMERCIAL

## 2018-11-02 DIAGNOSIS — M25.511 RIGHT SHOULDER PAIN, UNSPECIFIED CHRONICITY: Primary | ICD-10-CM

## 2018-11-02 PROCEDURE — 97022 WHIRLPOOL THERAPY: CPT

## 2018-11-02 PROCEDURE — 97110 THERAPEUTIC EXERCISES: CPT

## 2018-11-02 NOTE — PROGRESS NOTES
Daily Note     Today's date: 2018  Patient name: Robert Tapia  : 1970  MRN: [id number]  Referring provider: Urvashi Yousif MD  Dx:   Encounter Diagnosis     ICD-10-CM    1  Right shoulder pain, unspecified chronicity M25 511                   Subjective: " My shoulder is a little sore "      Objective: See treatment diary below  Manual  10/23/18  10/30/18  11/2/18       PROM shoulder all planes    20 min                                                                       Exercise Diary  10/23/18 10/30/18   11/2/18       scap elevation, depression, retraction 5  5  5 15   5  15  shld rolls front and back 10/10       Wall slides  FF  scaption Hold 10 sec  3  3           Upper Trap stretch with right arm at IR and scapula depression Hold 15 sec    Hold 15 sec  5x       pulleys  x10  x10 ea way  10x FF, ABD,   IR, hor add/abd       ER stretch at door      hold 15 sec  5x       AAROM with cane   FF  ABD  EXT  IR        10  10     hold 5 sec       10  10   10       UBC    6 min 80 resistance  7 min  Level 3 sitting       Posterior capsule stretch    x5  hold 15 sec  5x        IR towel stretch    10 sec hold x5          IR wall stretch    10 sec hold x5         penedulums    3# x30          shld ladder      FF 5x                                                                                                                             Modalities   10/30/18   11/2/18       Ultrasound right upper trap    10 min  8 min  1 Mhz   1 2 w/cm       Hot pack                                       Assessment: Tolerated treatment well  Tightness noted with ER stretch  Patient would benefit from continued OT  Plan: Continue per plan of care

## 2018-11-05 ENCOUNTER — OFFICE VISIT (OUTPATIENT)
Dept: OCCUPATIONAL THERAPY | Facility: CLINIC | Age: 48
End: 2018-11-05
Payer: COMMERCIAL

## 2018-11-05 DIAGNOSIS — M25.511 RIGHT SHOULDER PAIN, UNSPECIFIED CHRONICITY: Primary | ICD-10-CM

## 2018-11-05 PROCEDURE — 97035 APP MDLTY 1+ULTRASOUND EA 15: CPT

## 2018-11-05 PROCEDURE — 97110 THERAPEUTIC EXERCISES: CPT

## 2018-11-05 NOTE — PROGRESS NOTES
Daily Note     Today's date: 2018  Patient name: Barbara Malhotra  : 1970  MRN: [id number]  Referring provider: Amber Tanner MD  Dx:   Encounter Diagnosis     ICD-10-CM    1  Right shoulder pain, unspecified chronicity M25 511                   Subjective: I just don't have strength in my arm  Objective:   Manual  10/23/18  10/30/18  11/2/18 11/5/18      PROM shoulder all planes    20 min   15 min                                                                    Exercise Diary  10/23/18 10/30/18   11/2/18 11/5/18      scap elevation, depression, retraction 5  5  5 15   5  15  shld rolls front and back 10/10 x15  x5  x15      Wall slides  FF  scaption Hold 10 sec  3  3           Upper Trap stretch with right arm at IR and scapula depression Hold 15 sec    Hold 15 sec  5x Hold 15 sec x5      pulleys  x10  x10 ea way  10x FF, ABD,   IR, hor add/abd x10 FF,ABD, Horz abd/add      ER stretch at door      hold 15 sec  5x       AAROM with cane   FF  ABD  EXT  IR         10  10     hold 5 sec       10  10   10       UBC    6 min 80 resistance  7 min  Level 3 sitting  10 min 70 resistance     Posterior capsule stretch    x5  hold 15 sec  5x        IR towel stretch    10 sec hold x5    10 sec hold  x5      IR wall stretch    10 sec hold x5         penedulums    3# x30          shld ladder      FF 5x FF x5  Abd x5       Blackburn  Row  Ext  abd  FF          x30  x30  x5       sidelying ER        3# x30                                                                                               Modalities   10/30/18   11/2/18 11/5/18      Ultrasound right upper trap    10 min  8 min  1 Mhz   1 2 w/cm  10 min 1MHZ  1 0 w/cm     Hot pack                                           Assessment: Tolerated treatment fair  Patient demonstrated fatigue post treatment and would benefit from continued OT  Pt demonstrated moderate difficulty with blackburn ex, brittanie ff and abd  End range tightness noted        Plan: Continue per plan of care  Progress treatment as tolerated

## 2018-11-07 ENCOUNTER — OFFICE VISIT (OUTPATIENT)
Dept: OCCUPATIONAL THERAPY | Facility: CLINIC | Age: 48
End: 2018-11-07
Payer: COMMERCIAL

## 2018-11-07 DIAGNOSIS — M25.511 RIGHT SHOULDER PAIN, UNSPECIFIED CHRONICITY: Primary | ICD-10-CM

## 2018-11-07 PROCEDURE — 97110 THERAPEUTIC EXERCISES: CPT

## 2018-11-07 PROCEDURE — 97140 MANUAL THERAPY 1/> REGIONS: CPT

## 2018-11-07 NOTE — PROGRESS NOTES
Daily Note     Today's date: 2018  Patient name: Emperatriz Zapata  : 1970  MRN: [id number]  Referring provider: Nishi Roca MD  Dx:   Encounter Diagnosis     ICD-10-CM    1  Right shoulder pain, unspecified chronicity M25 511                   Subjective:  I feel better  Objective:   Manual  10/23/18  10/30/18  11/2/18 11/5/18  11/7/18    PROM shoulder all planes    20 min   15 min  10 min                                                                  Exercise Diary  10/23/18 10/30/18   11/2/18 11/5/18  11/7/18    scap elevation, depression, retraction 5  5  5 15   5  15  shld rolls front and back 10/10 x15  x5  x15  4# x20  4# x20  4# x20   Ball rythmic stab Hold 10 sec  3  3       Gr ball 1min cw & ccw    Upper Trap stretch with right arm at IR and scapula depression Hold 15 sec    Hold 15 sec    5x Hold 15 sec x5      pulleys  x10  x10 ea way  10x FF, ABD,   IR, hor add/abd x10 FF,ABD, Horz abd/add  x10 FF,ABD, Horz add/abd    ER stretch at door      hold 15 sec    5x    hold 15 sec x5 at 90 and higher   AAROM with cane   FF  ABD  EXT  IR         10  10     hold 5 sec       10  10   10       UBC    6 min 80 resistance  7 min  Level 3 sitting  10 min 70 resistance 10 min 60 resistance    Posterior capsule stretch    x5  hold 15 sec   5x    x5    IR towel stretch    10 sec hold x5    10 sec hold  x5      IR wall stretch    10 sec hold x5         penedulums    3# x30          shld ladder      FF 5x FF x5  Abd x5       Blackburn  Row  Ext  abd  FF          x30  x30  x5      x20  3# x20  3# x20  x20   sidelying ER        3# x30 3# x20    supine serratus punches         4# x20    Back against wall 90 deg ER           unable to do   Back against wall 90 ER thumb up           unable to do   theraband   Lat raises         Red    x20    Elbow 90 plank position               theraband under shld wall push up position an punch forward          red theraband  x20         Modalities   10/30/18   11/2/18 11/5/18      Ultrasound right upper trap    10 min  8 min  1 Mhz   1 2 w/cm  10 min 1MHZ  1 0 w/cm     Hot pack                                               Assessment: Tolerated treatment well  Patient demonstrated fatigue post treatment and would benefit from continued OT  Pt demonstrates increase P/AROM of RUE  Plan: Continue per plan of care  Progress treatment as tolerated

## 2018-11-12 ENCOUNTER — OFFICE VISIT (OUTPATIENT)
Dept: OCCUPATIONAL THERAPY | Facility: CLINIC | Age: 48
End: 2018-11-12
Payer: COMMERCIAL

## 2018-11-12 DIAGNOSIS — M25.511 RIGHT SHOULDER PAIN, UNSPECIFIED CHRONICITY: Primary | ICD-10-CM

## 2018-11-12 PROCEDURE — 97110 THERAPEUTIC EXERCISES: CPT

## 2018-11-12 NOTE — PROGRESS NOTES
Daily Note     Today's date: 2018  Patient name: Barbara Malhotra  : 1970  MRN: [id number]  Referring provider: Amber Tanner MD  Dx:   Encounter Diagnosis     ICD-10-CM    1  Right shoulder pain, unspecified chronicity M25 511                   Subjective: no new c/o's      Objective:   Manual  11/12/18  10/30/18  11/2/18 11/5/18  11/7/18    PROM shoulder all planes  5 min  20 min   15 min  10 min                                                                  Exercise Diary  11/12/18 10/30/18   11/2/18 11/5/18  11/7/18    scap elevation, depression, retraction 4# x20  4# x20  4# x20 15   5  15  shld rolls front and back 10/10 x15  x5  x15  4# x20  4# x20  4# x20   Ball rythmic stab Gr ball 1 min cw &ccw            Gr ball 1min cw & ccw    Upper Trap stretch with right arm at IR and scapula depression Hold 15 sec x5    Hold 15 sec    5x Hold 15 sec x5      pulleys x10 FF,Abd, horz abd/add  x10 ea way  10x FF, ABD,   IR, hor add/abd x10 FF,ABD, Horz abd/add  x10 FF,ABD, Horz add/abd    ER stretch at door 15 sec hold x5     hold 15 sec    5x    hold 15 sec x5 at 90 and higher   AAROM with cane   FF  ABD  EXT  IR         10  10     hold 5 sec       10  10   10       UBC 10 min 60 resistance   6 min 80 resistance  7 min  Level 3 sitting  10 min 70 resistance 10 min 60 resistance    Posterior capsule stretch    x5  hold 15 sec   5x    x5    IR towel stretch    10 sec hold x5    10 sec hold  x5      IR wall stretch    10 sec hold x5         penedulums    3# x30          shld ladder FF x5  Abd x5     FF 5x FF x5  Abd x5       Blackburn  Row  Ext  abd  FF  ER    4# x30  4# x30  x20  x30  x10        x30  x30  x5       x20  3# x20  3# x20  x20   sidelying ER 4# x20       3# x30 3# x20    supine serratus punches 4# x20        4# x20    Back against wall 90 deg ER  Facing wall  x20         unable to do   Back against wall 90 ER thumb up           unable to do   theraband   Lat raises Red x20       Red    x20  Elbow 90 plank position               theraband under shld wall push up position an punch forward  red  theraband  x20        red theraband  x20         Modalities   10/30/18   11/2/18 11/5/18      Ultrasound right upper trap    10 min  8 min  1 Mhz   1 2 w/cm  10 min 1MHZ  1 0 w/cm     Hot pack                                       Assessment: Tolerated treatment well  Patient demonstrated fatigue post treatment, exhibited good technique with therapeutic exercises and would benefit from continued OT  Pt demonstrates increase  AROM of shoulder  Denies pain  Plan: Continue per plan of care  Progress treatment as tolerated

## 2018-11-14 ENCOUNTER — EVALUATION (OUTPATIENT)
Dept: OCCUPATIONAL THERAPY | Facility: CLINIC | Age: 48
End: 2018-11-14
Payer: COMMERCIAL

## 2018-11-14 DIAGNOSIS — M25.511 RIGHT SHOULDER PAIN, UNSPECIFIED CHRONICITY: Primary | ICD-10-CM

## 2018-11-14 PROCEDURE — 97110 THERAPEUTIC EXERCISES: CPT

## 2018-11-14 PROCEDURE — G8991 OTHER PT/OT GOAL STATUS: HCPCS

## 2018-11-14 PROCEDURE — 97168 OT RE-EVAL EST PLAN CARE: CPT

## 2018-11-14 PROCEDURE — G8990 OTHER PT/OT CURRENT STATUS: HCPCS

## 2018-11-14 NOTE — PROGRESS NOTES
Daily Note     Today's date: 2018  Patient name: Diana Corrales  : 1970  MRN: [id number]  Referring provider: Hallie Davies MD  Dx:   Encounter Diagnosis     ICD-10-CM    1  Right shoulder pain, unspecified chronicity M25 511                   Subjective: My shoulder feels really tight today  Manual  18    PROM shoulder all planes  5 min     15 min  10 min                                                                  Exercise Diary  18    scap elevation, depression, retraction 4# x20  4# x20  4# x20 4# x20  4# x20  4# x20  shld rolls front and back 10/10 x15  x5  x15  4# x20  4# x20  4# x20   Ball rythmic stab Gr ball 1 min cw &ccw        Gr ball 1min cw & ccw      Gr ball 1min cw & ccw    Upper Trap stretch with right arm at IR and scapula depression Hold 15 sec x5  hold 15 sec  x5  Hold 15 sec    5x Hold 15 sec x5      pulleys x10 FF,Abd, horz abd/add  x10 ea way  10x FF, ABD,   IR, hor add/abd x10 FF,ABD, Horz abd/add  x10 FF,ABD, Horz add/abd    ER stretch at door 15 sec hold x5   15 sec hold  x5  hold 15 sec    5x    hold 15 sec x5 at 90 and higher   AAROM with cane   FF  ABD  EXT  IR                hold 5 sec       10  10   10       UBC 10 min 60 resistance  10 min 60 resistance  7 min  Level 3 sitting  10 min 70 resistance 10 min 60 resistance    Posterior capsule stretch    x5  hold 15 sec   5x    x5    IR towel stretch       10 sec hold  x5      IR wall stretch             penedulums    3# x30          shld ladder FF x5  Abd x5     FF 5x FF x5  Abd x5       Blackburn  Row  Ext  abd  FF  ER    4# x30  4# x30  x20  x30  x10    4# x30  4# x30  x20  x20  x10      x30  x30  x5       x20  3# x20  3# x20  x20   sidelying ER 4# x20  4# x20     3# x30 3# x20    supine serratus punches 4# x20  4# x20      4# x20    Back against wall 90 deg ER  Facing wall  x20     x20      unable to do   Back against wall 90 ER thumb up     x10  Facing wall      unable to do   theraband   Lat raises Red x20       Red    x20    Elbow 90 plank position               theraband under shld wall push up position an punch forward  red  theraband  x20        red theraband  x20         Modalities     11/2/18 11/5/18      Ultrasound right upper trap      8 min  1 Mhz   1 2 w/cm  10 min 1MHZ  1 0 w/cm     Hot pack                                   Objective:       Assessment: Tolerated treatment fair  Patient demonstrated fatigue post treatment and exhibited good technique with therapeutic exercises  Shoulder tightness noted all directions  Plan: Continue per plan of care  Progress treatment as tolerated  Pt to see MD Monday, await new orders

## 2018-11-14 NOTE — LETTER
2018    Cortez Norton MD  [facility]    Patient: Rubio Rinaldi   YOB: 1970   Date of Visit: 2018     Encounter Diagnosis     ICD-10-CM    1  Right shoulder pain, unspecified chronicity M25 511        Dear Dr Kyrie Jose:    Please review the attached Plan of Care from [facility] recent visit  Please verify that you agree therapy should continue by signing the attached document and sending it back to our office  If you have any questions or concerns, please don't hesitate to call  Sincerely,    Bg Leslie OT      Referring Provider:     I certify that I have read the below Plan of Care and certify the need for these services furnished under this plan of treatment while under my care  Cortez Norton MD  [address] [place] [address] 109: [phone]        OT Re-Evaluation     Today's date: 2018  Patient name: Rubio Rinaldi  : 1970  MRN: [id number]  Referring provider: Tanna Kelly MD  Dx:   Encounter Diagnosis     ICD-10-CM    1  Right shoulder pain, unspecified chronicity M25 511                   Assessment  Impairments: abnormal or restricted ROM and activity intolerance    Assessment details: Rubio Rinaldi is a 46 y o  male with a diagnosis of right shoulder pain  A moderatecomplexity evaluation was completed today due to fluctuating pain and a 53% FOTO score  Findings show an impairment with ROM, strength, pain, and reaching overhead for activities at work  He works full-time as a  at a school  He reports no difficulty with lifting and completing work duties close to his body  Patient will benefit from OT services to reach goals  18 Re-eval: Pt reports noticing minimal improvements w/ symptoms  Pt demonstrates improvements w/ ROM, however, not yet at [facility]   Pt demonstrates continued decreased strength & ROM for the RUE and continues to report difficulties w/ overhead activities for work completion  Pt would benefit from continued skilled OT services to address presenting limitations for safe return to PLOF  Understanding of Dx/Px/POC: good   Prognosis: good    Goals  STG's In 2 weeks:  1  Patient will report a decreased pain level of 1-2/10 in right upper trap and shoulder  MET  2  Patient will improve AROM of right shoulder FF/ABD to 120, IR to 50 and horizontal adduction to 110  - PARTIALLY MET   3  Patient will increase strength of right shoulder FF,ABD, and ER to 4/5 for lifting and carrying items  PARTIALLY MET  LTG's In 4 weeks:  1  Patient will report a decreased pain level of 0/10  - NOT MET  2  Patient will increase strength of right shoulder FF, ABD, and ER to 5/5 for lifting and carrying items  - NOT MET  3  Patient will improve AROM of right shoulder FF/ABD to 155, extension to 65, IR to 70, ER to 90, and hor adduction to 125 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  - NOT MET    Plan  Patient would benefit from: OT eval and skilled occupational therapy  Planned modality interventions: thermotherapy: hydrocollator packs, cryotherapy and ultrasound  Planned therapy interventions: home exercise program, therapeutic exercise, patient education, joint mobilization, manual therapy, strengthening, stretching, therapeutic activities and functional ROM exercises  Frequency: 2x week  Duration in visits: 8  Duration in weeks: 4  Treatment plan discussed with: patient  Plan details: Pt presents to OP OT services w/ a diagnosis of R shoulder pain  Pt has consistently attended OP OT services since [facility]  Pt demonstrates improvements with ROM, however, ROM is still not yet at [facility] and pt continues to demonstrate decreased strength for the RUE  Pt would benefit from continued skilled occupational therapy services two times per week for an additional 4 weeks to return to prior level of function and achieve all established goals   Thank you for the referral! Subjective Evaluation    History of Present Illness  Date of onset: 7/15/2018  Mechanism of injury: Patient has been having pain in upper trap and right shoulder for the past few months  Patient was treated at the chiropractor for 6 weeks with minimal relief  He consulted the [facility] due to continued symptoms  X rays and MRI were done which were negative  Patient has f/u appointment on 18    Quality of life: good    Pain  No pain reported  Current pain ratin  At best pain ratin  At worst pain ratin  Quality: burning and throbbing  Relieving factors: medications and rest  Aggravating factors: lifting and walking    Social Support  Steps to enter house: yes  Stairs in house: yes   Lives in: [place] house  Lives with: spouse    Employment status: working  Hand dominance: right      Diagnostic Tests  X-ray: normal  MRI studies: normal  Treatments  Previous treatment: chiropractic  Current treatment: occupational therapy  Patient Goals  Patient goals for therapy: increased strength, decreased pain, increased motion and return to sport/leisure activities          Objective     Active Range of Motion   Left Shoulder   Flexion: 155 degrees   Extension: 62 degrees   Abduction: 155 degrees   External rotation 90°:  90 degrees   Internal rotation 90°:  76 degrees   Horizontal abduction: 120 degrees     Right Shoulder   Flexion: 112 degrees   Extension: 55 degrees   Abduction: 95 degrees with pain  External rotation 90°:  85 degrees  Internal rotation 90°:  45 degrees   Horizontal abduction: 109 degrees     Additional Active Range of Motion Details  Scapula winging during IR    Strength/Myotome Testing     Left Shoulder     Planes of Motion   Flexion: 5   Extension: 5   Abduction: 5   Adduction: 5   External rotation at 0°:  5   Internal rotation at 0°:  5   Horizontal abduction: 5   Horizontal adduction: 5     Isolated Muscles   Triceps: 5     Right Shoulder     Planes of Motion   Flexion: 4- Extension: 4-   Abduction: 4-   External rotation at 0°:  4   Internal rotation at 0°:  5   Horizontal abduction: 4-   Horizontal adduction: 5     Isolated Muscles   Biceps: 4   Triceps: 4     Left Wrist/Hand      (2nd hand position)     Trial 1: 90    Right Wrist/Hand      (2nd hand position)     Trial 1: 115      Daily Treatment Diary         Exercise Diary 18       Re-eval completed         Re-eval completed w/ pt this date  Educated pt on continuation of skilled OT services w/ pt verbalizing understanding  Pt sees MD following week for follow-up  Continue POC and progress as tolerated by pt  Daily Note     Today's date: 2018  Patient name: Robert Tapia  : 1970  MRN: [id number]  Referring provider: Urvashi Yousif MD  Dx:   Encounter Diagnosis     ICD-10-CM    1  Right shoulder pain, unspecified chronicity M25 511                   Subjective: My shoulder feels really tight today  Manual  18    PROM shoulder all planes  5 min     15 min  10 min                                                                  Exercise Diary  18    scap elevation, depression, retraction 4# x20  4# x20  4# x20 4# x20  4# x20  4# x20  shld rolls front and back 10/10 x15  x5  x15  4# x20  4# x20  4# x20   Ball rythmic stab Gr ball 1 min cw &ccw        Gr ball 1min cw & ccw      Gr ball 1min cw & ccw    Upper Trap stretch with right arm at IR and scapula depression Hold 15 sec x5  hold 15 sec  x5  Hold 15 sec    5x Hold 15 sec x5      pulleys x10 FF,Abd, horz abd/add  x10 ea way  10x FF, ABD,   IR, hor add/abd x10 FF,ABD, Horz abd/add  x10 FF,ABD, Horz add/abd    ER stretch at door 15 sec hold x5   15 sec hold  x5  hold 15 sec    5x    hold 15 sec x5 at 90 and higher   AAROM with cane   FF  ABD  EXT  IR                hold 5 sec       10  10   10       UBC 10 min 60 resistance  10 min 60 resistance  7 min   Level 3 sitting  10 min 70 resistance 10 min 60 resistance    Posterior capsule stretch    x5  hold 15 sec  5x    x5    IR towel stretch       10 sec hold  x5      IR wall stretch             penedulums    3# x30          shld ladder FF x5  Abd x5     FF 5x FF x5  Abd x5       Blackburn  Row  Ext  abd  FF  ER    4# x30  4# x30  x20  x30  x10    4# x30  4# x30  x20  x20  x10      x30  x30  x5       x20  3# x20  3# x20  x20   sidelying ER 4# x20  4# x20     3# x30 3# x20    supine serratus punches 4# x20  4# x20      4# x20    Back against wall 90 deg ER  Facing wall  x20     x20      unable to do   Back against wall 90 ER thumb up     x10  Facing wall      unable to do   theraband   Lat raises Red x20       Red    x20    Elbow 90 plank position               theraband under shld wall push up position an punch forward  red  theraband  x20        red theraband  x20         Modalities     11/2/18 11/5/18      Ultrasound right upper trap      8 min  1 Mhz   1 2 w/cm  10 min 1MHZ  1 0 w/cm     Hot pack                                   Objective:       Assessment: Tolerated treatment fair  Patient demonstrated fatigue post treatment and exhibited good technique with therapeutic exercises  Shoulder tightness noted all directions  Plan: Continue per plan of care  Progress treatment as tolerated  Pt to see MD Monday, await new orders

## 2018-11-14 NOTE — PROGRESS NOTES
OT Re-Evaluation     Today's date: 2018  Patient name: Mich Barba  : 1970  MRN: [id number]  Referring provider: Luis Ahumada MD  Dx:   Encounter Diagnosis     ICD-10-CM    1  Right shoulder pain, unspecified chronicity M25 511                   Assessment  Impairments: abnormal or restricted ROM and activity intolerance    Assessment details: Mich Barba is a 46 y o  male with a diagnosis of right shoulder pain  A moderatecomplexity evaluation was completed today due to fluctuating pain and a 53% FOTO score  Findings show an impairment with ROM, strength, pain, and reaching overhead for activities at work  He works full-time as a  at a school  He reports no difficulty with lifting and completing work duties close to his body  Patient will benefit from OT services to reach goals  18 Re-eval: Pt reports noticing minimal improvements w/ symptoms  Pt demonstrates improvements w/ ROM, however, not yet at [facility]  Pt demonstrates continued decreased strength & ROM for the RUE and continues to report difficulties w/ overhead activities for work completion  Pt would benefit from continued skilled OT services to address presenting limitations for safe return to PLOF  Understanding of Dx/Px/POC: good   Prognosis: good    Goals  STG's In 2 weeks:  1  Patient will report a decreased pain level of 1-2/10 in right upper trap and shoulder  MET  2  Patient will improve AROM of right shoulder FF/ABD to 120, IR to 50 and horizontal adduction to 110  - PARTIALLY MET   3  Patient will increase strength of right shoulder FF,ABD, and ER to 4/5 for lifting and carrying items  PARTIALLY MET  LTG's In 4 weeks:  1  Patient will report a decreased pain level of 0/10  - NOT MET  2  Patient will increase strength of right shoulder FF, ABD, and ER to 5/5 for lifting and carrying items  - NOT MET  3   Patient will improve AROM of right shoulder FF/ABD to 155, extension to 65, IR to 70, ER to 90, and hor adduction to 125 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  - NOT MET    Plan  Patient would benefit from: OT eval and skilled occupational therapy  Planned modality interventions: thermotherapy: hydrocollator packs, cryotherapy and ultrasound  Planned therapy interventions: home exercise program, therapeutic exercise, patient education, joint mobilization, manual therapy, strengthening, stretching, therapeutic activities and functional ROM exercises  Frequency: 2x week  Duration in visits: 8  Duration in weeks: 4  Treatment plan discussed with: patient  Plan details: Pt presents to OP OT services w/ a diagnosis of R shoulder pain  Pt has consistently attended OP OT services since [facility]  Pt demonstrates improvements with ROM, however, ROM is still not yet at [facility] and pt continues to demonstrate decreased strength for the RUE  Pt would benefit from continued skilled occupational therapy services two times per week for an additional 4 weeks to return to prior level of function and achieve all established goals  Thank you for the referral!           Subjective Evaluation    History of Present Illness  Date of onset: 7/15/2018  Mechanism of injury: Patient has been having pain in upper trap and right shoulder for the past few months  Patient was treated at the chiropractor for 6 weeks with minimal relief  He consulted the [facility] due to continued symptoms  X rays and MRI were done which were negative  Patient has f/u appointment on 18    Quality of life: good    Pain  No pain reported  Current pain ratin  At best pain ratin  At worst pain ratin  Quality: burning and throbbing  Relieving factors: medications and rest  Aggravating factors: lifting and walking    Social Support  Steps to enter house: yes  Stairs in house: yes   Lives in: [facility]  Lives with: spouse    Employment status: working  Hand dominance: right      Diagnostic Tests  X-ray: normal  MRI studies: normal  Treatments  Previous treatment: chiropractic  Current treatment: occupational therapy  Patient Goals  Patient goals for therapy: increased strength, decreased pain, increased motion and return to sport/leisure activities          Objective     Active Range of Motion   Left Shoulder   Flexion: 155 degrees   Extension: 62 degrees   Abduction: 155 degrees   External rotation 90°: 90 degrees   Internal rotation 90°: 76 degrees   Horizontal abduction: 120 degrees     Right Shoulder   Flexion: 112 degrees   Extension: 55 degrees   Abduction: 95 degrees with pain  External rotation 90°: 85 degrees  Internal rotation 90°: 45 degrees   Horizontal abduction: 109 degrees     Additional Active Range of Motion Details  Scapula winging during IR    Strength/Myotome Testing     Left Shoulder     Planes of Motion   Flexion: 5   Extension: 5   Abduction: 5   Adduction: 5   External rotation at 0°: 5   Internal rotation at 0°: 5   Horizontal abduction: 5   Horizontal adduction: 5     Isolated Muscles   Triceps: 5     Right Shoulder     Planes of Motion   Flexion: 4-   Extension: 4-   Abduction: 4-   External rotation at 0°: 4   Internal rotation at 0°: 5   Horizontal abduction: 4-   Horizontal adduction: 5     Isolated Muscles   Biceps: 4   Triceps: 4     Left Wrist/Hand      (2nd hand position)     Trial 1: 90    Right Wrist/Hand      (2nd hand position)     Trial 1: 115      Daily Treatment Diary         Exercise Diary 11/14/18       Re-eval completed         Re-eval completed w/ pt this date  Educated pt on continuation of skilled OT services w/ pt verbalizing understanding  Pt sees MD following week for follow-up  Continue POC and progress as tolerated by pt

## 2018-11-21 ENCOUNTER — OFFICE VISIT (OUTPATIENT)
Dept: OCCUPATIONAL THERAPY | Facility: CLINIC | Age: 48
End: 2018-11-21
Payer: COMMERCIAL

## 2018-11-21 DIAGNOSIS — M25.511 RIGHT SHOULDER PAIN, UNSPECIFIED CHRONICITY: Primary | ICD-10-CM

## 2018-11-21 PROCEDURE — 97110 THERAPEUTIC EXERCISES: CPT

## 2018-11-21 NOTE — PROGRESS NOTES
Daily Note     Today's date: 2018  Patient name: Juan A De Paz  : 1970  MRN: [id number]  Referring provider: Phoenix Lui MD  Dx:   Encounter Diagnosis     ICD-10-CM    1   Right shoulder pain, unspecified chronicity M25 511        Subjective:  "That one was a good stretch"    Objective: See treatment diary below  Manual  18    PROM shoulder all planes  5 min     10 min                                                            Exercise Diary  18    scap elevation, depression, retraction 4# x20  4# x20  4# x20 4# x20  4# x20  4# x20 4# x20  4# x20  4# x20  4# x20  4# x20  4# x20   Ball rythmic stab Gr ball 1 min cw &ccw        Gr ball 1min cw & ccw    Gr ball 1min cw & ccw    Upper Trap stretch with right arm at IR and scapula depression Hold 15 sec x5  hold 15 sec  x5       pulleys x10 FF,Abd, horz abd/add  x10 ea way x15 each way  x10 FF,ABD, Horz add/abd    ER stretch at door 15 sec hold x5   15 sec hold  x5    hold 15 sec x5 at 90 and higher   AAROM with cane   FF  ABD  EXT  IR                    UBC 10 min 60 resistance  10 min 60 resistance 10 min   (5 forward, 5 backward)  10 min 60 resistance    Posterior capsule stretch    x5    x5    IR towel stretch     x20      Theraband Lat pulldown     Green x 20      penedulums    3# x30 3# x 30       shld ladder FF x5  Abd x5    FF x 5  ABD x 5       Blackburn  Row  Ext  abd  FF  ER    4# x30  4# x30  x20  x30  x10    4# x30  4# x30  x20  x20  x10   4# x30  4# x30  x20  x20       x20  3# x20  3# x20  x20   sidelying ER 4# x20  4# x20    3# x20    supine serratus punches 4# x20  4# x20    4# x20    Back against wall 90 deg ER  Facing wall  x20     x20    unable to do   Back against wall 90 ER thumb up     x10  Facing wall    unable to do   theraband   Lat raises Red x20   Green x 20  Red    x20    Elbow 90 plank position             theraband under shld wall push up position an punch forward  red  theraband  x20      red theraband  x20         Modalities           Ultrasound right upper trap           Hot pack                                  Assessment: Tolerated treatment well  Patient exhibited good technique with therapeutic exercises and would benefit from continued OT      Plan: Continue per plan of care

## 2018-11-27 ENCOUNTER — TRANSCRIBE ORDERS (OUTPATIENT)
Dept: PHYSICAL THERAPY | Facility: CLINIC | Age: 48
End: 2018-11-27

## 2018-11-27 DIAGNOSIS — G89.29 CHRONIC RIGHT SHOULDER PAIN: Primary | ICD-10-CM

## 2018-11-27 DIAGNOSIS — M25.511 CHRONIC RIGHT SHOULDER PAIN: Primary | ICD-10-CM

## 2018-11-28 ENCOUNTER — OFFICE VISIT (OUTPATIENT)
Dept: OCCUPATIONAL THERAPY | Facility: CLINIC | Age: 48
End: 2018-11-28
Payer: COMMERCIAL

## 2018-11-28 DIAGNOSIS — M25.511 RIGHT SHOULDER PAIN, UNSPECIFIED CHRONICITY: Primary | ICD-10-CM

## 2018-11-28 PROCEDURE — 97110 THERAPEUTIC EXERCISES: CPT

## 2018-11-28 NOTE — PROGRESS NOTES
Daily Note     Today's date: 2018  Patient name: Eliezer Khan  : 1970  MRN: [id number]  Referring provider: Naa Loredo MD  Dx: No diagnosis found  Subjective: The shot helped a little bit, but not with my motion  Objective:   Manual  18    PROM shoulder all planes  5 min     15 min  10 min                                                                  Exercise Diary  18    scap elevation, depression, retraction 4# x20  4# x20  4# x20 4# x20  4# x20  4# x20   x15  x5  x15  4# x20  4# x20  4# x20   Ball rythmic stab Gr ball 1 min cw &ccw        Gr ball 1min cw & ccw  Gr ball 1 min cw & ccw   Gr ball 1min cw & ccw    Upper Trap stretch with right arm at IR and scapula depression Hold 15 sec x5  hold 15 sec  x5  Hold 15 sec    5x Hold 15 sec x5      pulleys x10 FF,Abd, horz abd/add  x10 ea way  x20 FF, ABD,   IR, hor add/abd x10 FF,ABD, Horz abd/add  x10 FF,ABD, Horz add/abd    ER stretch at door  Hi   low 15 sec hold x5   15 sec hold  x5  hold 15 sec  x10  x10  x10    hold 15 sec x5 at 90 and higher   AAROM with cane   FF  ABD  EXT  IR                        UBC 10 min 60 resistance  10 min 60 resistance 10 min   60 resistance  10 min 70 resistance 10 min 60 resistance    Posterior capsule stretch    x5  hold 15 sec   5x    x5    IR towel stretch        10 sec hold  x5      IR wall stretch             penedulums    3# x30          shld ladder FF x5  Abd x5     FF 10  abd x10 FF x5  Abd x5       Blackburn  Row  Ext  abd  FF  ER    4# x30  4# x30  x20  x30  x10    4# x30  4# x30  x20  x20  x10   4# x30  4# x30   x20  4# x20    x30  x30  x5       x20  3# x20  3# x20  x20   sidelying ER 4# x20  4# x20   4# x30  3# x30 3# x20    supine serratus punches 4# x20  4# x20   4# x30   4# x20    Back against wall 90 deg ER  Facing wall  x20     x20      unable to do   Back against wall 90 ER thumb up     x10  Facing wall x15     unable to do   theraband   Lat raises Red x20       Red    x20    Elbow 90 plank position               theraband under shld wall push up position an punch forward  red  theraband  x20        red theraband  x20         Modalities           Ultrasound right upper trap           Hot pack                                 Assessment: Tolerated treatment fair  Patient demonstrated fatigue post treatment and would benefit from continued OT  Reports RUE feels tight  ER tightness noted  Scapula hiking still persists  Plan: Continue per plan of care  Progress treatment as tolerated

## 2018-12-03 ENCOUNTER — OFFICE VISIT (OUTPATIENT)
Dept: OCCUPATIONAL THERAPY | Facility: CLINIC | Age: 48
End: 2018-12-03
Payer: COMMERCIAL

## 2018-12-03 DIAGNOSIS — M25.511 RIGHT SHOULDER PAIN, UNSPECIFIED CHRONICITY: Primary | ICD-10-CM

## 2018-12-03 PROCEDURE — 97110 THERAPEUTIC EXERCISES: CPT

## 2018-12-03 NOTE — PROGRESS NOTES
Daily Note     Today's date: 12/3/2018  Patient name: Barbara Malhotra  : 1970  MRN: [id number]  Referring provider: Amber Tanner MD  Dx:   Encounter Diagnosis     ICD-10-CM    1  Right shoulder pain, unspecified chronicity M25 511                   Subjective: I don't really have pain in my shoulder, but I can tell I have more ROM  Objective:   Manual  18    PROM shoulder all planes  5 min      10 min                                                                  Exercise Diary  11/12/18 11/14/18  11/28/18 12/3/18 11/7/18    scap elevation, depression, retraction 4# x20  4# x20  4# x20 4# x20  4# x20  4# x20    4# x20  4# x20  4# x20   Ball rythmic stab Gr ball 1 min cw &ccw        Gr ball 1min cw & ccw  Gr ball 1 min cw & ccw Gr ball 1 min cw & ccw  Gr ball 1min cw & ccw    Upper Trap stretch with right arm at IR and scapula depression Hold 15 sec x5  hold 15 sec  x5  Hold 15 sec    5x      pulleys x10 FF,Abd, horz abd/add  x10 ea way  x20 FF, ABD,   IR, hor add/abd x20 FF,ABD, Horz abd/add  x10 FF,ABD, Horz add/abd    ER stretch at door  Hi   low 15 sec hold x5   15 sec hold  x5  hold 15 sec  x10  x10  x10 Hold 15 sec    x10  x10  hold 15 sec x5 at 90 and higher   UBC 10 min 60 resistance  10 min 60 resistance 10 min   60 resistance 10 min 60 resistance 10 min 60 resistance    Posterior capsule stretch    x5  hold 15 sec   5x Hold 15 sec  x10   x5    IR towel stretch              IR wall stretch             penedulums    3# x30          shld ladder FF x5  Abd x5     FF 10  abd x10 FF x10  Abd x5       Blackburn  Row  Ext  abd  FF  ER    4# x30  4# x30  x20  x30  x10    4# x30  4# x30  x20  x20  x10    4# x30  4# x30   x20  4# x20    4# x30  4# x30  x5  4# x30     x20  3# x20  3# x20  x20   sidelying ER 4# x20  4# x20   4# x30  4# x30 3# x20    supine serratus punches 4# x20  4# x20   4# x30  4# x30 4# x20    Back against wall 90 deg ER  Facing wall  x20     x20   x20   unable to do   Back against wall 90 ER thumb up     x10  Facing wall x15  x15   unable to do   theraband   Lat raises Red x20     Red x20  Red    x20    Elbow 90 plank position               theraband under shld wall push up position an punch forward  red  theraband  x20        red theraband  x20         Modalities             Ultrasound right upper trap             Hot pack                                   Assessment: Tolerated treatment fair+  Patient demonstrated fatigue post treatment, exhibited good technique with therapeutic exercises and would benefit from continued OT  ER less bothersome  Reports increase active shoulder flexion, abduction still limited  Winging of scapula slightly less  Plan: Continue per plan of care  Progress treatment as tolerated

## 2018-12-05 ENCOUNTER — APPOINTMENT (OUTPATIENT)
Dept: OCCUPATIONAL THERAPY | Facility: CLINIC | Age: 48
End: 2018-12-05
Payer: COMMERCIAL

## 2018-12-12 ENCOUNTER — OFFICE VISIT (OUTPATIENT)
Dept: OCCUPATIONAL THERAPY | Facility: CLINIC | Age: 48
End: 2018-12-12
Payer: COMMERCIAL

## 2018-12-12 DIAGNOSIS — M25.511 RIGHT SHOULDER PAIN, UNSPECIFIED CHRONICITY: Primary | ICD-10-CM

## 2018-12-12 PROCEDURE — 97110 THERAPEUTIC EXERCISES: CPT

## 2018-12-12 PROCEDURE — 97140 MANUAL THERAPY 1/> REGIONS: CPT

## 2018-12-12 NOTE — PROGRESS NOTES
Daily Note     Today's date: 2018  Patient name: Hammad Merritt  : 1970  MRN: [id number]  Referring provider: Jayden Bunch MD  Dx:   Encounter Diagnosis     ICD-10-CM    1  Right shoulder pain, unspecified chronicity M25 511                   Subjective: It feels really stiff  Objective:   Manual  18   PROM shoulder all planes  5 min       15min    graston            #5 serratus, lats and traps b/l                                                   Exercise Diary  11/12/18 11/14/18  11/28/18 12/3/18 12/12/18   scap elevation, depression, retraction 4# x20  4# x20  4# x20 4# x20  4# x20  4# x20        Ball rythmic stab Gr ball 1 min cw &ccw        Gr ball 1min cw & ccw  Gr ball 1 min cw & ccw Gr ball 1 min cw & ccw  Gr ball 1 5 min cw & ccw    Upper Trap stretch with right arm at IR and scapula depression Hold 15 sec x5  hold 15 sec  x5  Hold 15 sec    5x       pulleys x10 FF,Abd, horz abd/add  x10 ea way  x20 FF, ABD,   IR, hor add/abd x20 FF,ABD, Horz abd/add  x20 FF,ABD, Horz add/abd    ER stretch at door  Hi   low 15 sec hold x5   15 sec hold  x5  hold 15 sec  x10  x10  x10 Hold 15 sec     x10  x10  hold 15 sec x5 at 90 and higher   UBC 10 min 60 resistance  10 min 60 resistance 10 min   60 resistance 10 min 60 resistance 10 min 60 resistance    Posterior capsule stretch    x5  hold 15 sec   5x Hold 15 sec  x10       IR towel stretch              IR wall stretch             penedulums    3# x30          shld ladder FF x5  Abd x5     FF 10  abd x10 FF x10  Abd x5  FF x10  Abd x10     Blackburn  Row  Ext  abd  FF  ER    4# x30  4# x30  x20  x30  x10    4# x30  4# x30  x20  x20  x10    4# x30  4# x30   x20  4# x20    4# x30  4# x30  x5  4# x30     sidelying ER 4# x20  4# x20   4# x30  4# x30    supine serratus punches 4# x20  4# x20   4# x30  4# x30     Back against wall 90 deg ER  Facing wall  x20     x20   x20  x20    Back against wall 90 ER thumb up     x10  Facing wall x15  x15  x20   theraband   Lat raises Red x20     Red x20     Elbow 90 plank position               theraband under shld wall push up position an punch forward  red  theraband  x20                 Modalities             Ultrasound right upper trap             Hot pack                                Assessment: Tolerated treatment fair  Patient reports tightness in lats and low back  Pt reports tightness slightly decreased post tx  Slight increase in AROM post tx  Plan: Continue per plan of care  Progress treatment as tolerated

## 2018-12-17 ENCOUNTER — OFFICE VISIT (OUTPATIENT)
Dept: OCCUPATIONAL THERAPY | Facility: CLINIC | Age: 48
End: 2018-12-17
Payer: COMMERCIAL

## 2018-12-17 DIAGNOSIS — M25.511 RIGHT SHOULDER PAIN, UNSPECIFIED CHRONICITY: Primary | ICD-10-CM

## 2018-12-17 PROCEDURE — 97140 MANUAL THERAPY 1/> REGIONS: CPT

## 2018-12-17 PROCEDURE — 97110 THERAPEUTIC EXERCISES: CPT

## 2018-12-17 NOTE — PROGRESS NOTES
Daily Note     Today's date: 2018  Patient name: Jennifer Swann  : 1970  MRN: [id number]  Referring provider: Lawanda Brannon MD  Dx:   Encounter Diagnosis     ICD-10-CM    1  Right shoulder pain, unspecified chronicity M25 511                   Subjective: What you did last time took away my low back pain, and my shoulder is a little better  Objective:   Manual  18   PROM shoulder all planes  15 min       15min    graston    #4 & #5        #5 serratus, lats and traps b/l                                                   Exercise Diary  12/17/18 11/14/18  11/28/18 12/3/18 12/12/18   scap elevation, depression, retraction  4# x20  4# x20  4# x20         Ball rythmic stab Gr ball 1 5 min cw &ccw        Gr ball 1min cw & ccw  Gr ball 1 min cw & ccw Gr ball 1 min cw & ccw  Gr ball 1 5 min cw & ccw    Upper Trap stretch with right arm at IR and scapula depression Hold 15 sec x5  hold 15 sec  x5  Hold 15 sec    5x       pulleys x20 FF,Abd, horz abd/add  x10 ea way  x20 FF, ABD,   IR, hor add/abd x20 FF,ABD, Horz abd/add  x20 FF,ABD, Horz add/abd    ER stretch at door  Hi   low 15 sec hold x5   15 sec hold  x5  hold 15 sec  x10  x10  x10 Hold 15 sec     x10  x10  hold 15 sec x5 at 90 and higher   UBC 10 min 60 resistance  10 min 60 resistance 10 min   60 resistance 10 min 60 resistance 10 min 60 resistance    Posterior capsule stretch 15 sec hold  x10   x5  hold 15 sec   5x Hold 15 sec  x10       IR towel stretch              IR wall stretch             penedulums    3# x30          shld ladder FF x10  Abd x10     FF 10  abd x10 FF x10  Abd x5  FF x10  Abd x10     Blackburn  Row  Ext  abd  FF  ER        4# x30  4# x30  x20  x20  x10    4# x30  4# x30   x20  4# x20    4# x30  4# x30  x5  4# x30      sidelying ER 4# x20  4# x20   4# x30  4# x30     supine serratus punches 4# x20  4# x20   4# x30  4# x30     Back against wall 90 deg ER  Facing wall  x20     x20   x20  x20    Back against wall 90 ER thumb up  x20   x10  Facing wall x15  x15  x20   theraband   Lat raises      Red x20      Elbow 90 plank position               theraband under shld wall push up position an punch forward                  Modalities             Ultrasound right upper trap             Hot pack                                Assessment: Tolerated treatment well  Patient exhibited good technique with therapeutic exercises and would benefit from continued OT  Pt reports a slight increase in AROM and decrease tightness of RUE  Plan: Continue per plan of care  Progress treatment as tolerated

## 2018-12-20 ENCOUNTER — EVALUATION (OUTPATIENT)
Dept: OCCUPATIONAL THERAPY | Facility: CLINIC | Age: 48
End: 2018-12-20
Payer: COMMERCIAL

## 2018-12-20 DIAGNOSIS — M25.511 RIGHT SHOULDER PAIN, UNSPECIFIED CHRONICITY: Primary | ICD-10-CM

## 2018-12-20 PROCEDURE — G8991 OTHER PT/OT GOAL STATUS: HCPCS

## 2018-12-20 PROCEDURE — G8990 OTHER PT/OT CURRENT STATUS: HCPCS

## 2018-12-20 PROCEDURE — 97110 THERAPEUTIC EXERCISES: CPT

## 2018-12-20 NOTE — LETTER
2019    Sonny Hinds MD  [facility]    Patient: Juan A De Paz   YOB: 1970   Date of Visit: 2018     Encounter Diagnosis     ICD-10-CM    1  Right shoulder pain, unspecified chronicity M25 511        Dear Dr David Lau:    Please review the attached Plan of Care from [facility] recent visit  Please verify that you agree therapy should continue by signing the attached document and sending it back to our office  If you have any questions or concerns, please don't hesitate to call  Sincerely,    Chavez Daniel OT      Referring Provider:     I certify that I have read the below Plan of Care and certify the need for these services furnished under this plan of treatment while under my care  Sonny Hinds MD  [address]: [phone]        OT Re-Evaluation     Today's date: 2018  Patient name: Juan A De Paz  : 1970  MRN: [id number]  Referring provider: Phoenix Lui MD  Dx:   Encounter Diagnosis     ICD-10-CM    1  Right shoulder pain, unspecified chronicity M25 511                 Assessment  Assessment details: Juan A De Paz is a 46 y o  male with a diagnosis of right shoulder pain  A moderatecomplexity evaluation was completed today due to fluctuating pain and a 53% FOTO score  Findings show an impairment with ROM, strength, pain, and reaching overhead for activities at work  He works full-time as a  at a school  He reports no difficulty with lifting and completing work duties close to his body  Patient will benefit from OT services to reach goals  18 Re-eval: Pt reports noticing minimal improvements w/ symptoms  Pt demonstrates improvements w/ ROM, however, not yet at [facility]  Pt demonstrates continued decreased strength & ROM for the RUE and continues to report difficulties w/ overhead activities for work completion   Pt would benefit from continued skilled OT services to address presenting limitations for safe return to PLOF  REASSESSMENT: Patient has made gains with ROM in shoulder FF, ext, ER, IR,hor add  Also, strength has increased in deltoid, bicep, tricep, and RTC  FOTO score is 55%  Continued difficulty with overhead activities, particularly with sustained time  Pain has decreased in scapulae and lower back  Impairments: abnormal or restricted ROM and activity intolerance  Understanding of Dx/Px/POC: good   Prognosis: good    Goals  STG's In 2 weeks:  1  Patient will report a decreased pain level of 1-2/10 in right upper trap and shoulder  MET  2  Patient will improve AROM of right shoulder FF/ABD to 120, IR to 50 and horizontal adduction to 110  - PARTIALLY MET   3  Patient will increase strength of right shoulder FF,ABD, and ER to 4/5 for lifting and carrying items  PARTIALLY MET  LTG's In 4 weeks:  1  Patient will report a decreased pain level of 0/10  - NOT MET  2  Patient will increase strength of right shoulder FF, ABD, and ER to 5/5 for lifting and carrying items  - NOT MET  3  Patient will improve AROM of right shoulder FF/ABD to 155, extension to 65, IR to 70, ER to 90, and hor adduction to 125 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     Patient would benefit from: OT eval and skilled occupational therapy  Planned modality interventions: thermotherapy: hydrocollator packs, cryotherapy and ultrasound  Planned therapy interventions: home exercise program, therapeutic exercise, patient education, joint mobilization, manual therapy, strengthening, stretching, therapeutic activities and functional ROM exercises  Frequency: 2x week  Duration in visits: 8  Duration in weeks: 4  Plan of Care beginning date: 2018  Plan of Care expiration date: 2019  Treatment plan discussed with: patient        Subjective Evaluation    History of Present Illness  Date of onset: 7/15/2018  Mechanism of injury: Patient has been having pain in upper trap and right shoulder for the past few months  Patient was treated at the chiropractor for 6 weeks with minimal relief  He consulted the [facility] due to continued symptoms  X rays and MRI were done which were negative  Patient has f/u appointment on 18    Quality of life: good    Pain  No pain reported  Current pain ratin  At best pain ratin  At worst pain ratin  Quality: burning  Relieving factors: medications and rest  Aggravating factors: lifting and walking    Social Support  Steps to enter house: yes  Stairs in house: yes   Lives in: [facility]  Lives with: spouse    Employment status: working  Hand dominance: right      Diagnostic Tests  X-ray: normal  MRI studies: normal  Treatments  Previous treatment: chiropractic  Current treatment: occupational therapy  Patient Goals  Patient goals for therapy: increased strength, decreased pain, increased motion and return to sport/leisure activities          Objective     Active Range of Motion   Left Shoulder   Flexion: 155 degrees   Extension: 62 degrees   Abduction: 155 degrees   External rotation 90°:  90 degrees   Internal rotation 90°:  76 degrees   Horizontal abduction: 120 degrees     Right Shoulder   Flexion: 145 degrees   Extension: 60 degrees   Abduction: 104 degrees   External rotation 90°:  85 degrees  Internal rotation 90°:  70 degrees   Horizontal abduction: 140 degrees     Additional Active Range of Motion Details  Scapula winging continues to exist, however it is improving    Strength/Myotome Testing     Left Shoulder     Planes of Motion   Flexion: 5   Extension: 5   Abduction: 5   Adduction: 5   External rotation at 0°:  5   Internal rotation at 0°:  5   Horizontal abduction: 5   Horizontal adduction: 5     Isolated Muscles   Triceps: 5     Right Shoulder     Planes of Motion   Flexion: 4+   Extension: 5   Abduction: 4-   External rotation at 0°:  4   Internal rotation at 0°:  5   Horizontal abduction: 4   Horizontal adduction: 5     Isolated Muscles   Biceps: 5   Triceps: 4     Left Wrist/Hand      (2nd hand position)     Trial 1: 90    Right Wrist/Hand      (2nd hand position)     Trial 1: 115      Daily Treatment Diary     Manual  12/17/18 12/20/18 11/28/18 12/12/18   PROM shoulder all planes  15 min       15min    graston    #4 & #5        #5 serratus, lats and traps b/l    reassessment    done                                           Exercise Diary  12/17/18  12/20/18  11/28/18 12/3/18 12/12/18   scap elevation, depression, retraction           Ball rythmic stab Gr ball 1 5 min cw &ccw         Gr ball 1 min cw & ccw Gr ball 1 min cw & ccw  Gr ball 1 5 min cw & ccw    Upper Trap stretch with right arm at IR and scapula depression Hold 15 sec x5    Hold 15 sec    5x       pulleys x20 FF,Abd, horz abd/add  x10 ea way FF, ABD, hor add/abd  x20 FF, ABD,   IR, hor add/abd x20 FF,ABD, Horz abd/add  x20 FF,ABD, Horz add/abd    ER stretch at door  Hi   low 15 sec hold x5   corner stretch   hold 10 sec  5x  hold 15 sec  x10  x10  x10 Hold 15 sec     x10  x10  hold 15 sec x5 at 90 and higher   UBC 10 min 60 resistance  10 min 60 resistance 10 min   60 resistance 10 min 60 resistance 10 min 60 resistance    Posterior capsule stretch 15 sec hold  x10   N/A  hold 15 sec   5x Hold 15 sec  x10       IR towel stretch    ABD w/ cane 10x          IR wall stretch             penedulums              shld ladder FF x10  Abd x10     FF 10  abd x10 FF x10  Abd x5  FF x10  Abd x10     Blackburn  Row  Ext  abd  FF  ER      bodyblade   1 min   Each   elbow flex   elbow ext FF   abd w/ elbow ext   elbow ext by hip  Overhead       4# x30  4# x30   x20  4# x20    4# x30  4# x30  x5  4# x30      sidelying ER 4# x20    4# x30  4# x30     supine serratus punches 4# x20    4# x30  4# x30     Back against wall 90 deg ER  Facing wall  x20     x20   x20  x20    Back against wall 90 ER thumb up  x20   snow angels   against wall 5x x15  x15  x20   theraband   Lat raises      Red x20      Elbow 90 plank position               theraband under shld wall push up position an punch forward                  Modalities             Ultrasound right upper trap             Hot pack

## 2018-12-20 NOTE — PROGRESS NOTES
OT Re-Evaluation     Today's date: 2018  Patient name: Nancy Brown  : 1970  MRN: [id number]  Referring provider: Karyle Lazar, MD  Dx:   Encounter Diagnosis     ICD-10-CM    1  Right shoulder pain, unspecified chronicity M25 511                 Assessment  Assessment details: Nancy Brown is a 46 y o  male with a diagnosis of right shoulder pain  A moderatecomplexity evaluation was completed today due to fluctuating pain and a 53% FOTO score  Findings show an impairment with ROM, strength, pain, and reaching overhead for activities at work  He works full-time as a  at a school  He reports no difficulty with lifting and completing work duties close to his body  Patient will benefit from OT services to reach goals  18 Re-eval: Pt reports noticing minimal improvements w/ symptoms  Pt demonstrates improvements w/ ROM, however, not yet at [facility]  Pt demonstrates continued decreased strength & ROM for the RUE and continues to report difficulties w/ overhead activities for work completion  Pt would benefit from continued skilled OT services to address presenting limitations for safe return to PLOF  REASSESSMENT: Patient has made gains with ROM in shoulder FF, ext, ER, IR,hor add  Also, strength has increased in deltoid, bicep, tricep, and RTC  FOTO score is 55%  Continued difficulty with overhead activities, particularly with sustained time  Pain has decreased in scapulae and lower back  Impairments: abnormal or restricted ROM and activity intolerance  Understanding of Dx/Px/POC: good   Prognosis: good    Goals  STG's In 2 weeks:  1  Patient will report a decreased pain level of 1-2/10 in right upper trap and shoulder  MET  2  Patient will improve AROM of right shoulder FF/ABD to 120, IR to 50 and horizontal adduction to 110  - PARTIALLY MET   3  Patient will increase strength of right shoulder FF,ABD, and ER to 4/5 for lifting and carrying items  PARTIALLY MET  LTG's In 4 weeks:  1  Patient will report a decreased pain level of 0/10  - NOT MET  2  Patient will increase strength of right shoulder FF, ABD, and ER to 5/5 for lifting and carrying items  - NOT MET  3  Patient will improve AROM of right shoulder FF/ABD to 155, extension to 65, IR to 70, ER to 90, and hor adduction to 125 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     Patient would benefit from: OT eval and skilled occupational therapy  Planned modality interventions: thermotherapy: hydrocollator packs, cryotherapy and ultrasound  Planned therapy interventions: home exercise program, therapeutic exercise, patient education, joint mobilization, manual therapy, strengthening, stretching, therapeutic activities and functional ROM exercises  Frequency: 2x week  Duration in visits: 8  Duration in weeks: 4  Plan of Care beginning date: 2018  Plan of Care expiration date: 2019  Treatment plan discussed with: patient        Subjective Evaluation    History of Present Illness  Date of onset: 7/15/2018  Mechanism of injury: Patient has been having pain in upper trap and right shoulder for the past few months  Patient was treated at the chiropractor for 6 weeks with minimal relief  He consulted the [facility] due to continued symptoms  X rays and MRI were done which were negative  Patient has f/u appointment on 18    Quality of life: good    Pain  No pain reported  Current pain ratin  At best pain ratin  At worst pain ratin  Quality: burning  Relieving factors: medications and rest  Aggravating factors: lifting and walking    Social Support  Steps to enter house: yes  Stairs in house: yes   [place] in[place]  Lives with: spouse    Employment status: working  Hand dominance: right      Diagnostic Tests  X-ray: normal  MRI studies: normal  Treatments  Previous treatment: chiropractic  Current treatment: occupational therapy  Patient Goals  Patient goals for therapy: increased strength, decreased pain, increased motion and return to sport/leisure activities          Objective     Active Range of Motion   Left Shoulder   Flexion: 155 degrees   Extension: 62 degrees   Abduction: 155 degrees   External rotation 90°: 90 degrees   Internal rotation 90°: 76 degrees   Horizontal abduction: 120 degrees     Right Shoulder   Flexion: 145 degrees   Extension: 60 degrees   Abduction: 104 degrees   External rotation 90°: 85 degrees  Internal rotation 90°: 70 degrees   Horizontal abduction: 140 degrees     Additional Active Range of Motion Details  Scapula winging continues to exist, however it is improving    Strength/Myotome Testing     Left Shoulder     Planes of Motion   Flexion: 5   Extension: 5   Abduction: 5   Adduction: 5   External rotation at 0°: 5   Internal rotation at 0°: 5   Horizontal abduction: 5   Horizontal adduction: 5     Isolated Muscles   Triceps: 5     Right Shoulder     Planes of Motion   Flexion: 4+   Extension: 5   Abduction: 4-   External rotation at 0°: 4   Internal rotation at 0°: 5   Horizontal abduction: 4   Horizontal adduction: 5     Isolated Muscles   Biceps: 5   Triceps: 4     Left Wrist/Hand      (2nd hand position)     Trial 1: 90    Right Wrist/Hand      (2nd hand position)     Trial 1: 115      Daily Treatment Diary     Manual  18   PROM shoulder all planes  15 min       15min    da    #4 & #5        #5 serratus, lats and traps b/l  reassessment    done                                           Exercise Diary  12/17/18  12/20/18  11/28/18 12/3/18 12/12/18   scap elevation, depression, retraction           Ball rythmic stab Gr ball 1 5 min cw &ccw         Gr ball 1 min cw & ccw Gr ball 1 min cw & ccw  Gr ball 1 5 min cw & ccw    Upper Trap stretch with right arm at IR and scapula depression Hold 15 sec x5    Hold 15 sec    5x       pulleys x20 FF,Abd, horz abd/add  x10 ea way FF, ABD, hor add/abd  x20 FF, ABD,   IR, hor add/abd x20 FF,ABD, Horz abd/add  x20 FF,ABD, Horz add/abd    ER stretch at door  Hi   low 15 sec hold x5   corner stretch   hold 10 sec  5x  hold 15 sec  x10  x10  x10 Hold 15 sec     x10  x10  hold 15 sec x5 at 90 and higher   UBC 10 min 60 resistance  10 min 60 resistance 10 min   60 resistance 10 min 60 resistance 10 min 60 resistance    Posterior capsule stretch 15 sec hold  x10   N/A  hold 15 sec  5x Hold 15 sec  x10       IR towel stretch    ABD w/ cane 10x          IR wall stretch             penedulums              shld ladder FF x10  Abd x10     FF 10  abd x10 FF x10  Abd x5  FF x10  Abd x10     Blackburn  Row  Ext  abd  FF  ER      bodyblade   1 min   Each   elbow flex   elbow ext FF   abd w/ elbow ext   elbow ext by hip  Overhead       4# x30  4# x30   x20  4# x20    4# x30  4# x30  x5  4# x30      sidelying ER 4# x20    4# x30  4# x30     supine serratus punches 4# x20    4# x30  4# x30     Back against wall 90 deg ER  Facing wall  x20     x20   x20  x20    Back against wall 90 ER thumb up  x20   snow angels   against wall 5x x15  x15  x20   theraband   Lat raises      Red x20      Elbow 90 plank position               theraband under shld wall push up position an punch forward                  Modalities             Ultrasound right upper trap             Hot pack

## 2018-12-24 ENCOUNTER — OFFICE VISIT (OUTPATIENT)
Dept: OCCUPATIONAL THERAPY | Facility: CLINIC | Age: 48
End: 2018-12-24
Payer: COMMERCIAL

## 2018-12-24 DIAGNOSIS — M25.511 RIGHT SHOULDER PAIN, UNSPECIFIED CHRONICITY: Primary | ICD-10-CM

## 2018-12-24 PROCEDURE — 97110 THERAPEUTIC EXERCISES: CPT

## 2018-12-24 PROCEDURE — 97140 MANUAL THERAPY 1/> REGIONS: CPT

## 2018-12-24 NOTE — PROGRESS NOTES
Daily Note     Today's date: 2018  Patient name: Tiff Tuttle  : 1970  MRN: [id number]  Referring provider: Ivy Dempsey MD  Dx:   Encounter Diagnosis     ICD-10-CM    1  Right shoulder pain, unspecified chronicity M25 511                   Subjective: It's tight  Objective:   Manual  18   PROM shoulder all planes  15 min       15min    graston    #4 & #5    graston #5  Lats and traps b/l    #5 serratus, lats and traps b/l    reassessment    done                                           Exercise Diary  12/17/18  12/20/18  12/24/18 12/3/18 12/12/18   scap elevation, depression, retraction             Ball rythmic stab Gr ball 1 5 min cw &ccw           Gr ball 1 min cw & ccw  Gr ball 1 5 min cw & ccw    Upper Trap stretch with right arm at IR and scapula depression Hold 15 sec x5          pulleys x20 FF,Abd, horz abd/add  x10 ea way FF, ABD, hor add/abd  x20 FF, ABD,   IR, hor add/abd x20 FF,ABD, Horz abd/add  x20 FF,ABD, Horz add/abd    ER stretch at door  Hi   low 15 sec hold x5   corner stretch   hold 10 sec  5x  hold 15 sec  x10  x10  x10 Hold 15 sec     x10  x10  hold 15 sec x5 at 90 and higher   UBC 10 min 60 resistance  10 min 60 resistance 10 min   60 resistance 10 min 60 resistance 10 min 60 resistance    Posterior capsule stretch 15 sec hold  x10   N/A  hold 15 sec  x10 Hold 15 sec  x10       IR towel stretch    ABD w/ cane 10x          IR wall stretch             Blackburns       x30 no wt        shld ladder FF x10  Abd x10     FF 10  abd x10 FF x10  Abd x5  FF x10  Abd x10     Blackburn  Row  Ext  abd  FF  ER       bodyblade   1 min   Each   elbow flex   elbow ext FF   abd w/ elbow ext   elbow ext by hip  Overhead    bodyblade  1 min ea    4# x30  4# x30  x5  4# x30      sidelying ER 4# x20       4# x30     supine serratus punches 4# x20       4# x30     Back against wall 90 deg ER  Facing wall  x20     x20  x20 x20  x20    Back against wall 90 ER thumb up  x20   snow angels   against wall 5x x15  x15  x20   theraband   Lat raises       Red x20      Elbow 90 plank position               theraband under shld wall push up position an punch forward                   Modalities             Ultrasound right upper trap             Hot pack                                      Assessment: Tolerated treatment fair  Patient demonstrated fatigue post treatment, exhibited good technique with therapeutic exercises and would benefit from continued OT  Initially really tight decreased post tx  Scapula hiking still evident, but decreasing  Plan: Continue per plan of care  Progress treatment as tolerated

## 2018-12-28 PROCEDURE — 88305 TISSUE EXAM BY PATHOLOGIST: CPT | Performed by: PATHOLOGY

## 2018-12-29 ENCOUNTER — LAB REQUISITION (OUTPATIENT)
Dept: LAB | Facility: HOSPITAL | Age: 48
End: 2018-12-29
Payer: COMMERCIAL

## 2018-12-29 DIAGNOSIS — K57.30 DIVERTICULOSIS OF LARGE INTESTINE WITHOUT PERFORATION OR ABSCESS WITHOUT BLEEDING: ICD-10-CM

## 2018-12-29 DIAGNOSIS — Z80.0 FAMILY HISTORY OF MALIGNANT NEOPLASM OF DIGESTIVE ORGAN: ICD-10-CM

## 2018-12-29 DIAGNOSIS — D12.5 BENIGN NEOPLASM OF SIGMOID COLON: ICD-10-CM

## 2018-12-31 ENCOUNTER — OFFICE VISIT (OUTPATIENT)
Dept: OCCUPATIONAL THERAPY | Facility: CLINIC | Age: 48
End: 2018-12-31
Payer: COMMERCIAL

## 2018-12-31 DIAGNOSIS — M25.511 RIGHT SHOULDER PAIN, UNSPECIFIED CHRONICITY: Primary | ICD-10-CM

## 2018-12-31 PROCEDURE — 97110 THERAPEUTIC EXERCISES: CPT

## 2018-12-31 PROCEDURE — 97140 MANUAL THERAPY 1/> REGIONS: CPT

## 2018-12-31 NOTE — PROGRESS NOTES
Daily Note     Today's date: 2018  Patient name: Jack Lucero  : 1970  MRN: [id number]  Referring provider: Anisha Gagnon MD  Dx:   Encounter Diagnosis     ICD-10-CM    1  Right shoulder pain, unspecified chronicity M25 511                   Subjective: No new c/o's      Objective:   Manual  18   PROM shoulder all planes  15 min       15min    graston    #4 & #5    graston #5  Lats and traps b/l graston #5  Lats and traps b/l   #5 serratus, lats and traps b/l    reassessment    done                                           Exercise Diary  18   scap elevation, depression, retraction             Ball rythmic stab Gr ball 1 5 min cw &ccw            Gr ball 1 5 min cw & ccw  Gr ball 1 5 min cw & ccw    Upper Trap stretch with right arm at IR and scapula depression Hold 15 sec x5           pulleys x20 FF,Abd, horz abd/add  x10 ea way FF, ABD, hor add/abd  x20 FF, ABD,   IR, hor add/abd x20 FF,ABD, Horz abd/add  x20 FF,ABD, Horz add/abd    ER stretch at door  Hi   low 15 sec hold x5   corner stretch   hold 10 sec  5x  hold 15 sec  x10  x10  x10 Hold 15 sec     x10  x10  hold 15 sec x5 at 90 and higher   UBC 10 min 60 resistance  10 min 60 resistance 10 min   60 resistance 10 min 55 resistance 10 min 60 resistance    Posterior capsule stretch 15 sec hold  x10   N/A  hold 15 sec  x10 Hold 15 sec  x10       IR towel stretch    ABD w/ cane 10x          IR wall stretch             Blackburns       x30 no wt x30 no wt       shld ladder FF x10  Abd x10     FF 10  abd x10 FF x10  Abd x5  FF x10  Abd x10     Blackburn  Row  Ext  abd  FF  ER       bodyblade   1 min   Each   elbow flex   elbow ext FF   abd w/ elbow ext   elbow ext by hip  Overhead    bodyblade  1 min ea Bodyblade  1 min ea     sidelying ER 4# x20      x30 no wt     supine serratus punches 4# x20           Back against wall 90 deg ER  Facing wall  x20     x20  x20 x20  x20    Back against wall 90 ER thumb up  x20   snow angels   against wall 5x x15  x15  x20   theraband   Lat raises            Elbow 90 plank position               theraband under shld wall push up position an punch forward                   Modalities             Ultrasound right upper trap             Hot pack                                     Assessment: Tolerated treatment well  Patient demonstrated fatigue post treatment, exhibited good technique with therapeutic exercises and would benefit from continued OT  Scap winging decreasing  Progressing  Plan: Continue per plan of care  Progress treatment as tolerated

## 2019-01-02 ENCOUNTER — OFFICE VISIT (OUTPATIENT)
Dept: OCCUPATIONAL THERAPY | Facility: CLINIC | Age: 49
End: 2019-01-02
Payer: COMMERCIAL

## 2019-01-02 DIAGNOSIS — M25.511 RIGHT SHOULDER PAIN, UNSPECIFIED CHRONICITY: Primary | ICD-10-CM

## 2019-01-02 PROCEDURE — 97110 THERAPEUTIC EXERCISES: CPT

## 2019-01-02 NOTE — PROGRESS NOTES
Daily Note     Today's date: 2019  Patient name: Sissy Adams  : 1970  MRN: [id number]  Referring provider: Hebert Tanner MD  Dx:   Encounter Diagnosis     ICD-10-CM    1  Right shoulder pain, unspecified chronicity M25 511                   Subjective: My shoulder is feeling better  Objective:   Manual  18   PROM shoulder all planes  15 min          graston    #4 & #5    graston #5  Lats and traps b/l graston #5  Lats and traps b/l      reassessment    done                                           Exercise Diary  18   scap elevation, depression, retraction             Ball rythmic stab Gr ball 1 5 min cw &ccw            Gr ball 1 5 min cw & ccw  Gr ball 1 5 min cw & ccw    Upper Trap stretch with right arm at IR and scapula depression Hold 15 sec x5           pulleys x20 FF,Abd, horz abd/add  x10 ea way FF, ABD, hor add/abd  x20 FF, ABD,   IR, hor add/abd x20 FF,ABD, Horz abd/add  x20 FF,ABD, Horz add/abd    ER stretch at door  Hi   low 15 sec hold x5   corner stretch   hold 10 sec  5x  hold 15 sec  x10  x10  x10 Hold 15 sec     x10  x10  hold 15 sec x5 at 90 and higher   UBC 10 min 60 resistance  10 min 60 resistance 10 min   60 resistance 10 min 55 resistance 10 min 55 resistance    Posterior capsule stretch 15 sec hold  x10   N/A  hold 15 sec  x10 Hold 15 sec  x10       IR towel stretch    ABD w/ cane 10x      x10    IR wall stretch             Blackburns       x30 no wt x30 no wt   x30 no wt    shld ladder FF x10  Abd x10     FF 10  abd x10 FF x10  Abd x5  FF x10  Abd x10     Blackburn  Row  Ext  abd  FF  ER       bodyblade   1 min   Each   elbow flex   elbow ext FF   abd w/ elbow ext   elbow ext by hip  Overhead    bodyblade  1 min ea Bodyblade  1 min ea Body blade  1 min    sidelying ER 4# x20      x30 no wt 4# x30    supine serratus punches 4# x20            Back against wall 90 deg ER  Facing wall  x20     x20  x20 x20  x20    Back against wall 90 ER thumb up  x20   snow angels   against wall 5x x15  x15  x20   theraband   Lat raises             Elbow 90 plank position               theraband under shld wall push up position an punch forward                   Modalities             Ultrasound right upper trap             Hot pack                                        Assessment: Tolerated treatment well  Patient demonstrated fatigue post treatment, exhibited good technique with therapeutic exercises and would benefit from continued OT  Denies pain, demonstrates increase AROM of RUE  Plan: Continue per plan of care  Progress treatment as tolerated

## 2019-01-07 ENCOUNTER — OFFICE VISIT (OUTPATIENT)
Dept: OCCUPATIONAL THERAPY | Facility: CLINIC | Age: 49
End: 2019-01-07
Payer: COMMERCIAL

## 2019-01-07 DIAGNOSIS — M25.511 RIGHT SHOULDER PAIN, UNSPECIFIED CHRONICITY: Primary | ICD-10-CM

## 2019-01-07 PROCEDURE — 97110 THERAPEUTIC EXERCISES: CPT

## 2019-01-07 NOTE — PROGRESS NOTES
Daily Note     Today's date: 2019  Patient name: Darryle Gone  : 1970  MRN: [id number]  Referring provider: Alberto Argueta MD  Dx:   Encounter Diagnosis     ICD-10-CM    1  Right shoulder pain, unspecified chronicity M25 511                   Subjective: It feels good  Objective:   Manual     18   PROM shoulder all planes           graston      graston #5  Lats and traps b/l graston #5  Lats and traps b/l       reassessment                                              Exercise Diary  18   scap elevation, depression, retraction             Ball rythmic stab Gr ball 1 5 min cw &ccw            Gr ball 1 5 min cw & ccw  Gr ball 1 5 min cw & ccw    Upper Trap stretch with right arm at IR and scapula depression Hold 15 sec x5           pulleys x20 FF,Abd, horz abd/add  x10 ea way FF, ABD, hor add/abd  x20 FF, ABD,   IR, hor add/abd x20 FF,ABD, Horz abd/add  x20 FF,ABD, Horz add/abd    ER stretch at door  Hi   low 15 sec hold x5     x5  corner stretch   hold 10 sec  5x  hold 15 sec  x10  x10  x10 Hold 15 sec     x10  x10  hold 15 sec x5 at 90 and higher   UBC 10 min 55 resistance  10 min 60 resistance 10 min   60 resistance 10 min 55 resistance 10 min 55 resistance    Posterior capsule stretch 15 sec hold  x10   N/A  hold 15 sec  x10 Hold 15 sec  x10       IR towel stretch    ABD w/ cane 10x      x10    IR wall stretch             Blackburns   x30 no wt    x30 no wt x30 no wt   x30 no wt    shld ladder FF x10  Abd x10     FF 10  abd x10 FF x10  Abd x5  FF x10  Abd x10    Body blade      2 min each  Elbow flex  Elbow ext FF  abd w/elbow ext  elbow ext by hip  Overhead  bodyblade   1 min   Each   elbow flex   elbow ext FF   abd w/ elbow ext   elbow ext by hip  Overhead    bodyblade  1 min ea Bodyblade  1 min ea Body blade  1 min    sidelying ER 4# x30      x30 no wt 4# x30    supine serratus punches 4# x30            Back against wall 90 deg ER  Facing wall  x30    x20  x20 x20  x20    Back against wall 90 ER thumb up  Facing wall x30  snow angels   against wall 5x x15  x15  x20    theraband   5 sec hold  ER green  EXT  FF  ROW  Lat raises-red Blue     x15  x15  x15  x15  x15                  Modalities             Ultrasound right upper trap             Hot pack                                   Assessment: Tolerated treatment well  Patient demonstrated fatigue post treatment, exhibited good technique with therapeutic exercises and would benefit from continued OT  Reports increase soreness and fatigue post tx  Plan: Continue per plan of care  Progress treatment as tolerated

## 2019-01-10 ENCOUNTER — OFFICE VISIT (OUTPATIENT)
Dept: URGENT CARE | Facility: CLINIC | Age: 49
End: 2019-01-10
Payer: COMMERCIAL

## 2019-01-10 ENCOUNTER — APPOINTMENT (OUTPATIENT)
Dept: OCCUPATIONAL THERAPY | Facility: CLINIC | Age: 49
End: 2019-01-10
Payer: COMMERCIAL

## 2019-01-10 VITALS
SYSTOLIC BLOOD PRESSURE: 115 MMHG | RESPIRATION RATE: 18 BRPM | HEART RATE: 60 BPM | BODY MASS INDEX: 23.1 KG/M2 | OXYGEN SATURATION: 97 % | HEIGHT: 74 IN | DIASTOLIC BLOOD PRESSURE: 70 MMHG | TEMPERATURE: 98.4 F | WEIGHT: 180 LBS

## 2019-01-10 DIAGNOSIS — K52.9 AGE (ACUTE GASTROENTERITIS): Primary | ICD-10-CM

## 2019-01-10 PROCEDURE — 99213 OFFICE O/P EST LOW 20 MIN: CPT | Performed by: PHYSICIAN ASSISTANT

## 2019-01-10 NOTE — PROGRESS NOTES
[id number]    NAME: Nancy Brown is a 46 y o  male  : 1970    MRN: [id number]  DATE: January 10, 2019  TIME: 9:20 AM    Assessment and Plan   AGE (acute gastroenteritis) [K52 9]  1  AGE (acute gastroenteritis)         Patient Instructions     Patient Instructions   Stay hydrated by taking small sips of water through out the day  Avoid large amounts of water at one time  Advance diet as tolerated starting with crackers, toast   Can use imodium for diarrhea  If you are unable to hold down fluids and feel dehydrated, go to the emergency room  Can take tylenol or ibuprofen as needed for headache, pain, fever  Probiotics which can be found over the counter in pill form or in yogurt, such as activia, would be beneficial to increase normal gut aime and help with diarrhea  If symptoms worsen go to the emergency room  Chief Complaint     Chief Complaint   Patient presents with    Vomiting     Pt c/o vomiting, body aches, headache and head congestion x 2 days  History of Present Illness   68-year-old male here with complaint of nausea, vomiting, body aches and chills for the last 2 days  Also complaining of diarrhea  No Abdominal pain  Denies any upper respiratory complaints, cough  Review of Systems   Review of Systems   Constitutional: Positive for chills, fatigue and fever  Negative for activity change, appetite change, diaphoresis and unexpected weight change  HENT: Negative for congestion, ear pain, hearing loss, sinus pressure, sneezing, sore throat and tinnitus  Eyes: Negative for photophobia, redness and visual disturbance  Respiratory: Negative for apnea, cough, chest tightness, shortness of breath, wheezing and stridor  Cardiovascular: Negative for chest pain, palpitations and leg swelling  Gastrointestinal: Positive for diarrhea, nausea and vomiting  Negative for abdominal distention, abdominal pain, blood in stool and constipation     Endocrine: Negative for cold intolerance, heat intolerance, polydipsia, polyphagia and polyuria  Genitourinary: Negative for difficulty urinating, dysuria, flank pain, hematuria and urgency  Musculoskeletal: Positive for myalgias  Negative for arthralgias, back pain, gait problem, neck pain and neck stiffness  Skin: Negative for rash and wound  Neurological: Negative for dizziness, tremors, seizures, syncope, weakness, light-headedness, numbness and headaches  Hematological: Negative for adenopathy  Does not bruise/bleed easily  Psychiatric/Behavioral: Negative for agitation, behavioral problems, confusion and decreased concentration  The patient is not nervous/anxious  All other systems reviewed and are negative  Current Medications   No current outpatient prescriptions on file  Current Allergies     Allergies as of 01/10/2019 - Reviewed 01/10/2019   Allergen Reaction Noted    Penicillins Anaphylaxis 01/14/2013          The following portions of the patient's history were reviewed and updated as appropriate: allergies, current medications, past family history, past medical history, past social history, past surgical history and problem list    History reviewed  No pertinent past medical history  Past Surgical History:   Procedure Laterality Date    HERNIA REPAIR Right     RT Inguinal Hernia Repair  Last Assessed: [id number]     Family History   Problem Relation Age of Onset    Cancer Father         Bladder Cancer    Colon cancer Maternal Grandmother     Diabetes Paternal Grandmother     Diabetes Paternal Grandfather      Social History     Social History    Marital status: /Civil Union     Spouse name: N/A    Number of children: N/A    Years of education: N/A     Occupational History    Not on file       Social History Main Topics    Smoking status: Former Smoker    Smokeless tobacco: Never Used    Alcohol use Yes      Comment: occasional    Drug use: No    Sexual activity: Not on file Other Topics Concern    Not on file     Social History Narrative    Per [employer], Marital Status:  as of [id number]    Caffeine- 8 cups per day    Full time-      Medications have been verified  Objective   /70   Pulse 60   Temp 98 4 °F (36 9 °C)   Resp 18   Ht 6' 2" (1 88 m)   Wt 81 6 kg (180 lb)   SpO2 97%   BMI 23 11 kg/m²      Physical Exam   Physical Exam   Constitutional: He appears well-developed and well-nourished  No distress  HENT:   Head: Normocephalic  Right Ear: External ear normal    Left Ear: External ear normal    Nose: Nose normal    Mouth/Throat: Oropharynx is clear and moist  No oropharyngeal exudate  Neck: Normal range of motion  Neck supple  Cardiovascular: Normal rate, regular rhythm and normal heart sounds  No murmur heard  Pulmonary/Chest: Effort normal and breath sounds normal  No respiratory distress  He has no wheezes  He has no rales  Abdominal: Soft  Bowel sounds are normal  There is no tenderness  Musculoskeletal: Normal range of motion  Lymphadenopathy:     He has no cervical adenopathy  Skin: Skin is warm  No rash noted  Nursing note and vitals reviewed

## 2019-01-10 NOTE — PATIENT INSTRUCTIONS
Stay hydrated by taking small sips of water through out the day  Avoid large amounts of water at one time  Advance diet as tolerated starting with crackers, toast   Can use imodium for diarrhea  If you are unable to hold down fluids and feel dehydrated, go to the emergency room  Can take tylenol or ibuprofen as needed for headache, pain, fever  Probiotics which can be found over the counter in pill form or in yogurt, such as activia, would be beneficial to increase normal gut aime and help with diarrhea  If symptoms worsen go to the emergency room

## 2019-01-14 ENCOUNTER — OFFICE VISIT (OUTPATIENT)
Dept: OCCUPATIONAL THERAPY | Facility: CLINIC | Age: 49
End: 2019-01-14
Payer: COMMERCIAL

## 2019-01-14 DIAGNOSIS — M25.511 RIGHT SHOULDER PAIN, UNSPECIFIED CHRONICITY: Primary | ICD-10-CM

## 2019-01-14 PROCEDURE — 97110 THERAPEUTIC EXERCISES: CPT

## 2019-01-14 NOTE — PROGRESS NOTES
Daily Note     Today's date: 2019  Patient name: Diana Corrales  : 1970  MRN: [id number]  Referring provider: Hallie Davies MD  Dx:   Encounter Diagnosis     ICD-10-CM    1  Right shoulder pain, unspecified chronicity M25 511                   Subjective: No new c/o's      Objective:   Manual       18   PROM shoulder all planes             graston        graston #5  Lats and traps b/l graston #5  Lats and traps b/l       reassessment                                               Exercise Diary  18   Ball rythmic stab Gr ball 1 5 min cw &ccw        Red 1kg ball  1 min cw & ccw     Gr ball 1 5 min cw & ccw  Gr ball 1 5 min cw & ccw    Upper Trap stretch with right arm at IR and scapula depression Hold 15 sec x5           pulleys x20 FF,Abd, horz abd/add  x10 ea way FF, ABD, hor add/abd  x20 FF, ABD,   IR, hor add/abd x20 FF,ABD, Horz abd/add  x20 FF,ABD, Horz add/abd    ER stretch at door  Hi   low 15 sec hold x5      x5  corner stretch   hold 10 sec  5x  hold 15 sec  x10  x10  x10 Hold 15 sec     x10  x10  hold 15 sec x5 at 90 and higher   UBC 10 min 55 resistance  10 min 60 resistance 10 min   60 resistance 10 min 55 resistance 10 min 55 resistance    Posterior capsule stretch 15 sec hold  x10    hold 15 sec  x10 Hold 15 sec  x10       IR towel stretch         x10   Blackburns   x30 no wt 2# x30   x30 no wt x30 no wt   x30 no wt    shld ladder FF x10  Abd x10     FF 10  abd x10 FF x10  Abd x5  FF x10  Abd x10    Body blade      2 min each  Elbow flex  Elbow ext FF  abd w/elbow ext  elbow ext by hip  Overhead  bodyblade    min   Each   elbow flex   elbow ext FF   abd w/ elbow ext   elbow ext by hip  Overhead    bodyblade  1 min ea Bodyblade  1 min ea Body blade  1 min    sidelying ER 4# x30  4#x30    x30 no wt 4# x30    supine serratus punches 4# x30  4# x30          Back against wall 90 deg ER  Facing wall  x30    x30  x20 x20  x20    Back against wall 90 ER thumb up  Facing wall x30 x30 x15  x15  x20    theraband   5 sec hold  ER   EXT  FF  ROW  Lat raises-gr Blue      x15  x15  x15  x15  x15  Blue    x20  x20  x20  x20  x20                Modalities             Ultrasound right upper trap             Hot pack                                     Assessment: Tolerated treatment well  Patient demonstrated fatigue post treatment, exhibited good technique with therapeutic exercises and would benefit from continued OT  Reports soreness post tx  Plan: Continue per plan of care  Progress treatment as tolerated

## 2019-01-17 ENCOUNTER — EVALUATION (OUTPATIENT)
Dept: OCCUPATIONAL THERAPY | Facility: CLINIC | Age: 49
End: 2019-01-17
Payer: COMMERCIAL

## 2019-01-17 DIAGNOSIS — M25.511 RIGHT SHOULDER PAIN, UNSPECIFIED CHRONICITY: Primary | ICD-10-CM

## 2019-01-17 PROCEDURE — G8991 OTHER PT/OT GOAL STATUS: HCPCS

## 2019-01-17 PROCEDURE — G8990 OTHER PT/OT CURRENT STATUS: HCPCS

## 2019-01-17 PROCEDURE — 97110 THERAPEUTIC EXERCISES: CPT

## 2019-01-17 NOTE — LETTER
2019    Merle Zelaya MD  [facility]    Patient: Harriet Persaud   YOB: 1970   Date of Visit: 2019     Encounter Diagnosis     ICD-10-CM    1  Right shoulder pain, unspecified chronicity M25 511        Dear Dr Jeanne Apple:    Please review the attached Plan of Care from [facility] recent visit  Please verify that you agree therapy should continue by signing the attached document and sending it back to our office  If you have any questions or concerns, please don't hesitate to call  Sincerely,    Richard Hawkins OT      Referring Provider:     I certify that I have read the below Plan of Care and certify the need for these services furnished under this plan of treatment while under my care  Merle Zelaya MD  [address] [place] [address]: [phone]        OT Re-Evaluation     Today's date: 2019  Patient name: Harriet Persaud  : 1970  MRN: [id number]  Referring provider: Juan Alberto Pride MD  Dx:   Encounter Diagnosis     ICD-10-CM    1  Right shoulder pain, unspecified chronicity M25 511                 Assessment  Assessment details: Harriet Persaud is a 46 y o  male with a diagnosis of right shoulder pain  A moderatecomplexity evaluation was completed today due to fluctuating pain and a 53% FOTO score  Findings show an impairment with ROM, strength, pain, and reaching overhead for activities at work  He works full-time as a  at a school  He reports no difficulty with lifting and completing work duties close to his body  Patient will benefit from OT services to reach goals  18 Re-eval: Pt reports noticing minimal improvements w/ symptoms  Pt demonstrates improvements w/ ROM, however, not yet at [facility]  Pt demonstrates continued decreased strength & ROM for the RUE and continues to report difficulties w/ overhead activities for work completion   Pt would benefit from continued skilled OT services to address presenting limitations for safe return to PLOF  REASSESSMENT: Patient has made gains with ROM in shoulder FF, ext, ER, IR,hor add  Also, strength has increased in deltoid, bicep, tricep, and RTC  FOTO score is 55%  Continued difficulty with overhead activities, particularly with sustained time  Pain has decreased in scapulae and lower back  1/17/19 REASSESSMENT: Patient was seen for OT services since 10/23/18  FOTO score is 62%  Patient has made gains with Active ROM with his right shoulder in all planes  His strength has also improved to good in all planes  Patient does not report any pain with activities  He states that performing overhead activities such as scrubbing/washing windows and walls remains difficult  Impairments: abnormal or restricted ROM and activity intolerance  Understanding of Dx/Px/POC: good   Prognosis: good    Goals  STG's In 2 weeks:  1  Patient will report a decreased pain level of 1-2/10 in right upper trap and shoulder  MET  2  Patient will improve AROM of right shoulder FF/ABD to 120, IR to 50 and horizontal adduction to 110  -  MET   3  Patient will increase strength of right shoulder FF,ABD, and ER to 4/5 for lifting and carrying items  MET  LTG's In 4 weeks:  1  Patient will report a decreased pain level of 0/10  - MET  2  Patient will increase strength of right shoulder FF, ABD, and ER to 5/5 for lifting and carrying items  - NOT MET  3  Patient will improve AROM of right shoulder FF/ABD to 155, extension to 65, IR to 70, ER to 90, and hor adduction to 125 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  - PARTIALLY MET    Plan  Plan details: Pt presents to OP OT services w/ a diagnosis of R shoulder pain  Pt has consistently attended OP OT services since [facility]  Pt demonstrates improvements with ROM, however, ROM is still not yet at [facility] and pt continues to demonstrate decreased strength for the RUE   Pt would benefit from continued skilled occupational therapy services two times per week for an additional 4 weeks to return to prior level of function and achieve all established goals  Patient would benefit from: OT eval and skilled occupational therapy  Planned modality interventions: thermotherapy: hydrocollator packs, cryotherapy and ultrasound  Planned therapy interventions: home exercise program, therapeutic exercise, patient education, joint mobilization, manual therapy, strengthening, stretching, therapeutic activities and functional ROM exercises  Frequency: 2x week  Duration in visits: 8  Duration in weeks: 4  Plan of Care beginning date: 2018  Plan of Care expiration date: 2019  Treatment plan discussed with: patient        Subjective Evaluation    History of Present Illness  Date of onset: 7/15/2018  Mechanism of injury: Patient has been having pain in upper trap and right shoulder for the past few months  Patient was treated at the chiropractor for 6 weeks with minimal relief  He consulted the [facility] due to continued symptoms  X rays and MRI were done which were negative  Patient has f/u appointment on 18    Quality of life: good    Pain  No pain reported  Current pain ratin  At best pain ratin  At worst pain ratin  Quality: burning  Relieving factors: medications and rest  Aggravating factors: lifting and walking    Social Support  Steps to enter house: yes  Stairs in house: yes   Lives in: [facility]  Lives with: spouse    Employment status: working  Hand dominance: right      Diagnostic Tests  X-ray: normal  MRI studies: normal  Treatments  Previous treatment: chiropractic  Current treatment: occupational therapy  Patient Goals  Patient goals for therapy: increased strength, decreased pain, increased motion and return to sport/leisure activities          Objective     Active Range of Motion   Left Shoulder   Flexion: 155 degrees   Extension: 62 degrees   Abduction: 155 degrees   External rotation 90°:  90 degrees   Internal rotation 90°:  76 degrees   Horizontal adduction: 130 degrees     Right Shoulder   Flexion: 151 degrees   Extension: 65 degrees   Abduction: 130 degrees   External rotation 90°:  90 degrees  Internal rotation 90°:  70 degrees   Horizontal adduction: 135 degrees     Additional Active Range of Motion Details  Scapula winging continues to exist, however it is improving    Strength/Myotome Testing     Left Shoulder     Planes of Motion   Flexion: 5   Extension: 5   Abduction: 5   Adduction: 5   External rotation at 0°:  5   Internal rotation at 0°:  5   Horizontal abduction: 5   Horizontal adduction: 5     Isolated Muscles   Triceps: 5     Right Shoulder     Planes of Motion   Flexion: 5   Extension: 5   Abduction: 4   External rotation at 0°:  4   Internal rotation at 0°:  5   Horizontal abduction: 4+   Horizontal adduction: 5     Isolated Muscles   Biceps: 5   Triceps: 4     Left Wrist/Hand      (2nd hand position)     Trial 1: 90    Right Wrist/Hand      (2nd hand position)     Trial 1: 115      Flowsheet Rows      Most Recent Value   PT/OT G-Codes   Current Score  62   Assessment Type  Re-evaluation   G code set  Other PT/OT Primary   Other PT Primary Current Status ()  CJ   Other PT Primary Goal Status ()  CI      Daily Treatment Diary     Manual       1/17/19 12/31/18 1/2/19   PROM shoulder all planes             graston         graston #5  Lats and traps b/l       reassessment      done                                         Exercise Diary  1/7/19 1/14/19 1/17/19 12/31/18 1/2/19   Ball rythmic stab Gr ball 1 5 min cw &ccw        Red 1kg ball  1 min cw & ccw    re- assessment completed Gr ball 1 5 min cw & ccw  Gr ball 1 5 min cw & ccw    Upper Trap stretch with right arm at IR and scapula depression Hold 15 sec x5    planks modified on knees hold 5 sec   5x, side planks and pushups 5x       pulleys x20 FF,Abd, horz abd/add  x10 ea way FF, ABD, hor add/abd  x20 FF, ABD,   IR, hor add/abd x20 FF,ABD, Horz abd/add  x20 FF,ABD, Horz add/abd    ER stretch at door  Hi   low 15 sec hold x5      x5  corner stretch   hold 10 sec  5x   Hold 15 sec     x10  x10  hold 15 sec x5 at 90 and higher   UBC 10 min 55 resistance  10 min 60 resistance 8 min   60 resistance 10 min 55 resistance 10 min 55 resistance    Posterior capsule stretch 15 sec hold  x10     Hold 15 sec  x10       IR towel stretch         x10   Blackburns   x30 no wt 2# x30    x30 no wt   x30 no wt    shld ladder FF x10  Abd x10      FF x10  Abd x5  FF x10  Abd x10    Body blade      2 min each  Elbow flex  Elbow ext FF  abd w/elbow ext  elbow ext by hip  Overhead  bodyblade    min   Each   elbow flex   elbow ext FF   abd w/ elbow ext   elbow ext by hip  Overhead     Bodyblade  1 min ea Body blade  1 min    sidelying ER 4# x30  4#x30    x30 no wt 4# x30    supine serratus punches 4# x30  4# x30          Back against wall 90 deg ER  Facing wall  x30    x30   x20  x20    Back against wall 90 ER thumb up  Facing wall x30 x30  x15  x20    theraband   5 sec hold  ER   EXT  FF  ROW  Lat raises-gr Blue      x15  x15  x15  x15  x15  Blue    x20  x20  x20  x20  x20                Modalities             Ultrasound right upper trap             Hot pack

## 2019-01-17 NOTE — PROGRESS NOTES
OT Re-Evaluation     Today's date: 2019  Patient name: Javon Varela  : 1970  MRN: [id number]  Referring provider: Michell Canas MD  Dx:   Encounter Diagnosis     ICD-10-CM    1  Right shoulder pain, unspecified chronicity M25 511                 Assessment  Assessment details: Javon Varela is a 46 y o  male with a diagnosis of right shoulder pain  A moderatecomplexity evaluation was completed today due to fluctuating pain and a 53% FOTO score  Findings show an impairment with ROM, strength, pain, and reaching overhead for activities at work  He works full-time as a  at a school  He reports no difficulty with lifting and completing work duties close to his body  Patient will benefit from OT services to reach goals  18 Re-eval: Pt reports noticing minimal improvements w/ symptoms  Pt demonstrates improvements w/ ROM, however, not yet at [facility]  Pt demonstrates continued decreased strength & ROM for the RUSILVIA and continues to report difficulties w/ overhead activities for work completion  Pt would benefit from continued skilled OT services to address presenting limitations for safe return to PLOF  REASSESSMENT: Patient has made gains with ROM in shoulder FF, ext, ER, IR,hor add  Also, strength has increased in deltoid, bicep, tricep, and RTC  FOTO score is 55%  Continued difficulty with overhead activities, particularly with sustained time  Pain has decreased in scapulae and lower back  19 REASSESSMENT: Patient was seen for OT services since 10/23/18  FOTO score is 62%  Patient has made gains with Active ROM with his right shoulder in all planes  His strength has also improved to good in all planes  Patient does not report any pain with activities  He states that performing overhead activities such as scrubbing/washing windows and walls remains difficult     Impairments: abnormal or restricted ROM and activity intolerance  Understanding of Dx/Px/POC: good   Prognosis: good    Goals  STG's In 2 weeks:  1  Patient will report a decreased pain level of 1-2/10 in right upper trap and shoulder  MET  2  Patient will improve AROM of right shoulder FF/ABD to 120, IR to 50 and horizontal adduction to 110  -  MET   3  Patient will increase strength of right shoulder FF,ABD, and ER to 4/5 for lifting and carrying items  MET  LTG's In 4 weeks:  1  Patient will report a decreased pain level of 0/10  - MET  2  Patient will increase strength of right shoulder FF, ABD, and ER to 5/5 for lifting and carrying items  - NOT MET  3  Patient will improve AROM of right shoulder FF/ABD to 155, extension to 65, IR to 70, ER to 90, and hor adduction to 125 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  - PARTIALLY MET    Plan  Plan details: Pt presents to OP OT services w/ a diagnosis of R shoulder pain  Pt has consistently attended OP OT services since [facility]  Pt demonstrates improvements with ROM, however, ROM is still not yet at [facility] and pt continues to demonstrate decreased strength for the RUE  Pt would benefit from continued skilled occupational therapy services two times per week for an additional 4 weeks to return to prior level of function and achieve all established goals      Patient would benefit from: OT eval and skilled occupational therapy  Planned modality interventions: thermotherapy: hydrocollator packs, cryotherapy and ultrasound  Planned therapy interventions: home exercise program, therapeutic exercise, patient education, joint mobilization, manual therapy, strengthening, stretching, therapeutic activities and functional ROM exercises  Frequency: 2x week  Duration in visits: 8  Duration in weeks: 4  Plan of Care beginning date: 12/20/2018  Plan of Care expiration date: 1/21/2019  Treatment plan discussed with: patient        Subjective Evaluation    History of Present Illness  Date of onset: 7/15/2018  Mechanism of injury: Patient has been having pain in upper trap and right shoulder for the past few months  Patient was treated at the chiropractor for 6 weeks with minimal relief  He consulted the [facility] due to continued symptoms  X rays and MRI were done which were negative  Patient has f/u appointment on 18    Quality of life: good    Pain  No pain reported  Current pain ratin  At best pain ratin  At worst pain ratin  Quality: burning  Relieving factors: medications and rest  Aggravating factors: lifting and walking    Social Support  Steps to enter house: yes  Stairs in house: yes   Lives in: [facility]  Lives with: spouse    Employment status: working  Hand dominance: right      Diagnostic Tests  X-ray: normal  MRI studies: normal  Treatments  Previous treatment: chiropractic  Current treatment: occupational therapy  Patient Goals  Patient goals for therapy: increased strength, decreased pain, increased motion and return to sport/leisure activities          Objective     Active Range of Motion   Left Shoulder   Flexion: 155 degrees   Extension: 62 degrees   Abduction: 155 degrees   External rotation 90°: 90 degrees   Internal rotation 90°: 76 degrees   Horizontal adduction: 130 degrees     Right Shoulder   Flexion: 151 degrees   Extension: 65 degrees   Abduction: 130 degrees   External rotation 90°: 90 degrees  Internal rotation 90°: 70 degrees   Horizontal adduction: 135 degrees     Additional Active Range of Motion Details  Scapula winging continues to exist, however it is improving    Strength/Myotome Testing     Left Shoulder     Planes of Motion   Flexion: 5   Extension: 5   Abduction: 5   Adduction: 5   External rotation at 0°: 5   Internal rotation at 0°: 5   Horizontal abduction: 5   Horizontal adduction: 5     Isolated Muscles   Triceps: 5     Right Shoulder     Planes of Motion   Flexion: 5   Extension: 5   Abduction: 4   External rotation at 0°: 4   Internal rotation at 0°: 5   Horizontal abduction: 4+   Horizontal adduction: 5     Isolated Muscles Biceps: 5   Triceps: 4     Left Wrist/Hand      (2nd hand position)     Trial 1: 90    Right Wrist/Hand      (2nd hand position)     Trial 1: 115      Flowsheet Rows      Most Recent Value   PT/OT G-Codes   Current Score  62   Assessment Type  Re-evaluation   G code set  Other PT/OT Primary   Other PT Primary Current Status ()  CJ   Other PT Primary Goal Status ()  CI      Daily Treatment Diary     Manual       1/17/19 12/31/18 1/2/19   PROM shoulder all planes             graston         graston #5  Lats and traps b/l       reassessment      done                                         Exercise Diary  1/7/19 1/14/19 1/17/19 12/31/18 1/2/19   Ball rythmic stab Gr ball 1 5 min cw &ccw        Red 1kg ball  1 min cw & ccw    re- assessment completed Gr ball 1 5 min cw & ccw  Gr ball 1 5 min cw & ccw    Upper Trap stretch with right arm at IR and scapula depression Hold 15 sec x5    planks modified on knees hold 5 sec  5x, side planks and pushups 5x       pulleys x20 FF,Abd, horz abd/add  x10 ea way FF, ABD, hor add/abd  x20 FF, ABD,   IR, hor add/abd x20 FF,ABD, Horz abd/add  x20 FF,ABD, Horz add/abd    ER stretch at door  Hi   low 15 sec hold x5      x5  corner stretch   hold 10 sec  5x   Hold 15 sec     x10  x10  hold 15 sec x5 at 90 and higher   UBC 10 min 55 resistance  10 min 60 resistance 8 min   60 resistance 10 min 55 resistance 10 min 55 resistance    Posterior capsule stretch 15 sec hold  x10     Hold 15 sec  x10       IR towel stretch         x10   Blackburns   x30 no wt 2# x30    x30 no wt   x30 no wt    shld ladder FF x10  Abd x10      FF x10  Abd x5  FF x10  Abd x10    Body blade      2 min each  Elbow flex  Elbow ext FF  abd w/elbow ext  elbow ext by hip  Overhead  bodyblade    min   Each   elbow flex   elbow ext FF   abd w/ elbow ext   elbow ext by hip  Overhead     Bodyblade  1 min ea Body blade  1 min    sidelying ER 4# x30  4#x30    x30 no wt 4# x30    supine serratus punches 4# x30  4# x30          Back against wall 90 deg ER  Facing wall  x30    x30   x20  x20    Back against wall 90 ER thumb up  Facing wall x30 x30  x15  x20    theraband   5 sec hold  ER   EXT  FF  ROW  Lat raises-gr Blue      x15  x15  x15  x15  x15  Blue    x20  x20  x20  x20  x20                Modalities             Ultrasound right upper trap             Hot pack

## 2019-01-21 ENCOUNTER — APPOINTMENT (OUTPATIENT)
Dept: OCCUPATIONAL THERAPY | Facility: CLINIC | Age: 49
End: 2019-01-21
Payer: COMMERCIAL

## 2019-01-24 ENCOUNTER — OFFICE VISIT (OUTPATIENT)
Dept: OCCUPATIONAL THERAPY | Facility: CLINIC | Age: 49
End: 2019-01-24
Payer: COMMERCIAL

## 2019-01-24 DIAGNOSIS — M25.511 RIGHT SHOULDER PAIN, UNSPECIFIED CHRONICITY: Primary | ICD-10-CM

## 2019-01-24 PROCEDURE — 97110 THERAPEUTIC EXERCISES: CPT

## 2019-01-24 NOTE — PROGRESS NOTES
Daily Note     Today's date: 2019  Patient name: Ximena Israel  : 1970  MRN: [id number]  Referring provider: Nazario Napier MD  Dx:   Encounter Diagnosis     ICD-10-CM    1  Right shoulder pain, unspecified chronicity M25 511                   Subjective: The doctor said I don't need that manipulation  Objective:   Manual       19   PROM shoulder all planes             graston                reassessment      done                                         Exercise Diary  19    Ball rythmic stab Gr ball 1 5 min cw &ccw        Red 1kg ball  1 min cw & ccw     re- assessment completed Red 1 kg 1 min cw & ccw     Upper Trap stretch with right arm at IR and scapula depression Hold 15 sec x5    planks modified on knees hold 5 sec  5x, side planks and pushups 5x  declined    pulleys x20 FF,Abd, horz abd/add  x10 ea way FF, ABD, hor add/abd  x20 FF, ABD,   IR, hor add/abd x20 FF,ABD, Horz abd/add     ER stretch at door  Hi   low 15 sec hold x5      x5  corner stretch   hold 10 sec  5x   Hold 15 sec     x10  x10    UBC 10 min 55 resistance  10 min 60 resistance 8 min   60 resistance 10 min 55 resistance    Posterior capsule stretch 15 sec hold  x10      Hold 15 sec  x10      IR towel stretch            Blackburns   x30 no wt 2# x30    2# x30      shld ladder FF x10  Abd x10          Body blade      2 min each  Elbow flex  Elbow ext FF  abd w/elbow ext  elbow ext by hip  Overhead  bodyblade    min   Each   elbow flex   elbow ext FF   abd w/ elbow ext   elbow ext by hip  Overhead      Bodyblade  2 min each  Elbow flex  Elbow ext ff  abd w/elbow ext  Elbow ext by hip  Over head    sidelying ER 4# x30  4#x30    4# x30    supine serratus punches 4# x30  4# x30         Back against wall 90 deg ER  Facing wall  x30    x30   x30     Back against wall 90 ER thumb up  Facing wall x30 x30   x30     theraband   5 sec hold  ER   EXT  FF  ROW  Lat raises-gr Blue    x15  x15  x15  x15  x15  Blue     x20  x20  x20  x20  x20    Blue    x20  x20  x20  x20             Modalities             Ultrasound right upper trap             Hot pack                                   Assessment: Tolerated treatment well  Patient demonstrated fatigue post treatment, exhibited good technique with therapeutic exercises and would benefit from continued OT  Provided pt with blue theraband and handout on UE strenghtening for RUE  Pt demonstrated good knowledge of same  Plan: Pt has achieved max OT benefit, demonstrates increase funcitonal use of  RUE  Denies pain  D/C OT tx today

## 2019-01-28 ENCOUNTER — TRANSCRIBE ORDERS (OUTPATIENT)
Dept: PHYSICAL THERAPY | Facility: CLINIC | Age: 49
End: 2019-01-28

## 2019-01-28 DIAGNOSIS — G89.29 CHRONIC RIGHT SHOULDER PAIN: Primary | ICD-10-CM

## 2019-01-28 DIAGNOSIS — M25.511 CHRONIC RIGHT SHOULDER PAIN: Primary | ICD-10-CM

## 2019-02-06 NOTE — PROGRESS NOTES
DISCHARGE  Patient has consistently attended OP OT services since start of care  Patient has attended 21 visits since start of care  Patient's last appt this date  Patient and therapist discussed d/c from skilled therapy services and have agreed on Plan of Care  Patient made good progress toward goals in care and has reached maximum potential  Thank you for the referral  Please refer to patient's last assessment for most recent objective measurements

## 2019-08-15 DIAGNOSIS — F52.21 ERECTILE DYSFUNCTION OF NON-ORGANIC ORIGIN: Primary | ICD-10-CM

## 2019-08-15 RX ORDER — TADALAFIL 20 MG/1
TABLET ORAL
COMMUNITY
Start: 2019-07-19 | End: 2019-08-15 | Stop reason: SDUPTHER

## 2019-08-15 RX ORDER — TADALAFIL 20 MG/1
20 TABLET ORAL DAILY PRN
Qty: 10 TABLET | Refills: 0 | Status: SHIPPED | OUTPATIENT
Start: 2019-08-15 | End: 2020-07-22 | Stop reason: SDUPTHER

## 2019-09-09 ENCOUNTER — OFFICE VISIT (OUTPATIENT)
Dept: FAMILY MEDICINE CLINIC | Facility: CLINIC | Age: 49
End: 2019-09-09
Payer: COMMERCIAL

## 2019-09-09 VITALS
TEMPERATURE: 98.2 F | SYSTOLIC BLOOD PRESSURE: 112 MMHG | DIASTOLIC BLOOD PRESSURE: 80 MMHG | HEIGHT: 73 IN | WEIGHT: 183 LBS | BODY MASS INDEX: 24.25 KG/M2

## 2019-09-09 DIAGNOSIS — R79.89 LOW TESTOSTERONE: ICD-10-CM

## 2019-09-09 DIAGNOSIS — Z12.5 SCREENING FOR PROSTATE CANCER: ICD-10-CM

## 2019-09-09 DIAGNOSIS — K21.9 GASTROESOPHAGEAL REFLUX DISEASE, ESOPHAGITIS PRESENCE NOT SPECIFIED: ICD-10-CM

## 2019-09-09 DIAGNOSIS — F52.21 ERECTILE DYSFUNCTION OF NON-ORGANIC ORIGIN: ICD-10-CM

## 2019-09-09 DIAGNOSIS — Z00.00 WELL ADULT EXAM: Primary | ICD-10-CM

## 2019-09-09 PROCEDURE — 99396 PREV VISIT EST AGE 40-64: CPT | Performed by: FAMILY MEDICINE

## 2019-09-09 RX ORDER — PANTOPRAZOLE SODIUM 20 MG/1
20 TABLET, DELAYED RELEASE ORAL DAILY PRN
Qty: 30 TABLET | Refills: 2 | Status: SHIPPED | OUTPATIENT
Start: 2019-09-09 | End: 2020-07-22 | Stop reason: SDUPTHER

## 2019-09-09 NOTE — PROGRESS NOTES
Assessment/Plan:  We discussed treatment options for acid reflux  Recommended pantoprazole as needed  Consider GI evaluation if symptoms persist or worsen  We also discussed possibly switching to Viagra or Levitra  He would like to stick with Cialis in the interim  No problem-specific Assessment & Plan notes found for this encounter  Diagnoses and all orders for this visit:    Well adult exam  -     pantoprazole (PROTONIX) 20 mg tablet; Take 1 tablet (20 mg total) by mouth daily as needed for heartburn for up to 30 days  -     CBC and differential  -     Comprehensive metabolic panel  -     Lipid Panel with Direct LDL reflex  -     PSA, Total Screen; Future  -     Testosterone; Future    Low testosterone  -     pantoprazole (PROTONIX) 20 mg tablet; Take 1 tablet (20 mg total) by mouth daily as needed for heartburn for up to 30 days  -     CBC and differential  -     Comprehensive metabolic panel  -     Lipid Panel with Direct LDL reflex  -     PSA, Total Screen; Future  -     Testosterone; Future    Erectile dysfunction of non-organic origin  -     pantoprazole (PROTONIX) 20 mg tablet; Take 1 tablet (20 mg total) by mouth daily as needed for heartburn for up to 30 days  -     CBC and differential  -     Comprehensive metabolic panel  -     Lipid Panel with Direct LDL reflex  -     PSA, Total Screen; Future  -     Testosterone; Future    Gastroesophageal reflux disease, esophagitis presence not specified    Screening for prostate cancer  -     PSA, Total Screen; Future          Subjective:      Patient ID: Froy Nogueira is a 44 y o  male  Patient here today for annual well check  He is up-to-date on colonoscopy  He is due for blood testing  He does note the Cialis occasionally is causing acid reflux symptoms  He would like to discuss treatment options  He otherwise is feeling fine  He is trying to remain active        The following portions of the patient's history were reviewed and updated as appropriate: allergies, current medications, past family history, past medical history, past social history, past surgical history and problem list     Review of Systems   Constitutional: Negative  HENT: Negative  Eyes: Negative  Respiratory: Negative  Cardiovascular: Negative  Gastrointestinal: Negative  As noted in HPI   Endocrine: Negative  Genitourinary: Negative  Musculoskeletal: Negative  Skin: Negative  Allergic/Immunologic: Negative  Neurological: Negative  Hematological: Negative  Psychiatric/Behavioral: Negative  Objective:      /80 (BP Location: Left arm, Patient Position: Sitting, Cuff Size: Adult)   Temp 98 2 °F (36 8 °C)   Ht 6' 1 23" (1 86 m)   Wt 83 kg (183 lb)   BMI 23 99 kg/m²          Physical Exam   Constitutional: He is oriented to person, place, and time  He appears well-developed and well-nourished  HENT:   Head: Normocephalic and atraumatic  Right Ear: External ear normal  Tympanic membrane is not erythematous and not bulging  Left Ear: External ear normal  Tympanic membrane is not erythematous and not bulging  Nose: Nose normal    Mouth/Throat: Oropharynx is clear and moist and mucous membranes are normal  No oral lesions  No oropharyngeal exudate  Eyes: Conjunctivae and EOM are normal  Right eye exhibits no discharge  Left eye exhibits no discharge  No scleral icterus  Neck: Normal range of motion  Neck supple  No thyromegaly present  Cardiovascular: Normal rate, regular rhythm and normal heart sounds  Exam reveals no gallop and no friction rub  No murmur heard  Pulmonary/Chest: Effort normal  No respiratory distress  He has no wheezes  He has no rales  He exhibits no tenderness  Abdominal: Soft  Bowel sounds are normal  He exhibits no distension and no mass  There is no tenderness  There is no rebound and no guarding  Musculoskeletal: Normal range of motion  He exhibits no edema, tenderness or deformity  Lymphadenopathy:     He has no cervical adenopathy  Neurological: He is alert and oriented to person, place, and time  He has normal reflexes  No cranial nerve deficit  He exhibits normal muscle tone  Coordination normal    Skin: Skin is warm and dry  No rash noted  No erythema  No pallor  Psychiatric: He has a normal mood and affect  His behavior is normal    Vitals reviewed

## 2019-09-11 ENCOUNTER — LAB (OUTPATIENT)
Dept: LAB | Facility: CLINIC | Age: 49
End: 2019-09-11
Payer: COMMERCIAL

## 2019-09-11 DIAGNOSIS — Z12.5 SCREENING FOR PROSTATE CANCER: ICD-10-CM

## 2019-09-11 DIAGNOSIS — Z00.00 WELL ADULT EXAM: ICD-10-CM

## 2019-09-11 DIAGNOSIS — F52.21 ERECTILE DYSFUNCTION OF NON-ORGANIC ORIGIN: ICD-10-CM

## 2019-09-11 DIAGNOSIS — R79.89 LOW TESTOSTERONE: ICD-10-CM

## 2019-09-11 LAB
ALBUMIN SERPL BCP-MCNC: 4.4 G/DL (ref 3.5–5)
ALP SERPL-CCNC: 72 U/L (ref 46–116)
ALT SERPL W P-5'-P-CCNC: 28 U/L (ref 12–78)
ANION GAP SERPL CALCULATED.3IONS-SCNC: 5 MMOL/L (ref 4–13)
AST SERPL W P-5'-P-CCNC: 16 U/L (ref 5–45)
BASOPHILS # BLD AUTO: 0.03 THOUSANDS/ΜL (ref 0–0.1)
BASOPHILS NFR BLD AUTO: 1 % (ref 0–1)
BILIRUB SERPL-MCNC: 0.69 MG/DL (ref 0.2–1)
BUN SERPL-MCNC: 19 MG/DL (ref 5–25)
CALCIUM SERPL-MCNC: 9.3 MG/DL (ref 8.3–10.1)
CHLORIDE SERPL-SCNC: 109 MMOL/L (ref 100–108)
CHOLEST SERPL-MCNC: 184 MG/DL (ref 50–200)
CO2 SERPL-SCNC: 28 MMOL/L (ref 21–32)
CREAT SERPL-MCNC: 1 MG/DL (ref 0.6–1.3)
EOSINOPHIL # BLD AUTO: 0.11 THOUSAND/ΜL (ref 0–0.61)
EOSINOPHIL NFR BLD AUTO: 2 % (ref 0–6)
ERYTHROCYTE [DISTWIDTH] IN BLOOD BY AUTOMATED COUNT: 13.6 % (ref 11.6–15.1)
GFR SERPL CREATININE-BSD FRML MDRD: 87 ML/MIN/1.73SQ M
GLUCOSE P FAST SERPL-MCNC: 90 MG/DL (ref 65–99)
HCT VFR BLD AUTO: 40.1 % (ref 36.5–49.3)
HDLC SERPL-MCNC: 51 MG/DL (ref 40–60)
HGB BLD-MCNC: 13.5 G/DL (ref 12–17)
IMM GRANULOCYTES # BLD AUTO: 0 THOUSAND/UL (ref 0–0.2)
IMM GRANULOCYTES NFR BLD AUTO: 0 % (ref 0–2)
LDLC SERPL CALC-MCNC: 116 MG/DL (ref 0–100)
LYMPHOCYTES # BLD AUTO: 1.75 THOUSANDS/ΜL (ref 0.6–4.47)
LYMPHOCYTES NFR BLD AUTO: 32 % (ref 14–44)
MCH RBC QN AUTO: 29.9 PG (ref 26.8–34.3)
MCHC RBC AUTO-ENTMCNC: 33.7 G/DL (ref 31.4–37.4)
MCV RBC AUTO: 89 FL (ref 82–98)
MONOCYTES # BLD AUTO: 0.44 THOUSAND/ΜL (ref 0.17–1.22)
MONOCYTES NFR BLD AUTO: 8 % (ref 4–12)
NEUTROPHILS # BLD AUTO: 3.12 THOUSANDS/ΜL (ref 1.85–7.62)
NEUTS SEG NFR BLD AUTO: 57 % (ref 43–75)
NRBC BLD AUTO-RTO: 0 /100 WBCS
PLATELET # BLD AUTO: 194 THOUSANDS/UL (ref 149–390)
PMV BLD AUTO: 10.6 FL (ref 8.9–12.7)
POTASSIUM SERPL-SCNC: 4.3 MMOL/L (ref 3.5–5.3)
PROT SERPL-MCNC: 7.6 G/DL (ref 6.4–8.2)
PSA SERPL-MCNC: 1.1 NG/ML (ref 0–4)
RBC # BLD AUTO: 4.51 MILLION/UL (ref 3.88–5.62)
SODIUM SERPL-SCNC: 142 MMOL/L (ref 136–145)
TESTOST SERPL-MCNC: 436 NG/DL (ref 95–948)
TRIGL SERPL-MCNC: 87 MG/DL
WBC # BLD AUTO: 5.45 THOUSAND/UL (ref 4.31–10.16)

## 2019-09-11 PROCEDURE — 85025 COMPLETE CBC W/AUTO DIFF WBC: CPT | Performed by: FAMILY MEDICINE

## 2019-09-11 PROCEDURE — G0103 PSA SCREENING: HCPCS

## 2019-09-11 PROCEDURE — 36415 COLL VENOUS BLD VENIPUNCTURE: CPT | Performed by: FAMILY MEDICINE

## 2019-09-11 PROCEDURE — 80053 COMPREHEN METABOLIC PANEL: CPT | Performed by: FAMILY MEDICINE

## 2019-09-11 PROCEDURE — 80061 LIPID PANEL: CPT | Performed by: FAMILY MEDICINE

## 2019-09-11 PROCEDURE — 84403 ASSAY OF TOTAL TESTOSTERONE: CPT

## 2020-03-14 ENCOUNTER — OFFICE VISIT (OUTPATIENT)
Dept: URGENT CARE | Facility: CLINIC | Age: 50
End: 2020-03-14
Payer: COMMERCIAL

## 2020-03-14 VITALS
SYSTOLIC BLOOD PRESSURE: 122 MMHG | HEART RATE: 58 BPM | WEIGHT: 183 LBS | RESPIRATION RATE: 16 BRPM | HEIGHT: 73 IN | BODY MASS INDEX: 24.25 KG/M2 | OXYGEN SATURATION: 98 % | TEMPERATURE: 98.6 F | DIASTOLIC BLOOD PRESSURE: 72 MMHG

## 2020-03-14 DIAGNOSIS — J02.9 ACUTE PHARYNGITIS, UNSPECIFIED ETIOLOGY: Primary | ICD-10-CM

## 2020-03-14 DIAGNOSIS — R07.89 OTHER CHEST PAIN: ICD-10-CM

## 2020-03-14 DIAGNOSIS — J02.9 SORETHROAT: ICD-10-CM

## 2020-03-14 LAB — S PYO AG THROAT QL: NEGATIVE

## 2020-03-14 PROCEDURE — 87880 STREP A ASSAY W/OPTIC: CPT | Performed by: NURSE PRACTITIONER

## 2020-03-14 PROCEDURE — 93005 ELECTROCARDIOGRAM TRACING: CPT | Performed by: NURSE PRACTITIONER

## 2020-03-14 PROCEDURE — 99213 OFFICE O/P EST LOW 20 MIN: CPT | Performed by: NURSE PRACTITIONER

## 2020-03-14 RX ORDER — AZITHROMYCIN 250 MG/1
TABLET, FILM COATED ORAL
Qty: 6 TABLET | Refills: 0 | Status: SHIPPED | OUTPATIENT
Start: 2020-03-14 | End: 2020-03-19

## 2020-03-14 NOTE — PROGRESS NOTES
[facility]        NAME: Modesta Bridges is a 44 y o  male  : 1970    MRN: [id number]  DATE: 2020  TIME: 3:35 PM    Assessment and Plan   Acute pharyngitis, unspecified etiology [J02 9]  1  Acute pharyngitis, unspecified etiology  azithromycin (ZITHROMAX) 250 mg tablet   2  Sorethroat  POCT rapid strepA   3  Other chest pain       EKG is sinus Yoni Picket  Discussed with patient since he has been having episodes of chest pain that I would recommend he follow up with his PCP  Did offer for the ER for further evaluation but he declined  At this point he has not had any chest pain in 24 hours and symptoms seem to be episodic  Discussed with him that he should call Monday and make an appointment for him to have further evaluation of the symptoms  He verbalized understanding of this instruction  The    Patient Instructions     Patient Instructions   Start antibiotic  Take probiotic  OTC cough and cold medication as needed  Rapid strep negative  EKG shows sinus Ynoi Picket  I would recommend you call your PCP on Monday for appointment in regards to episodes of chest pain  If this worsens, you develop chest pain, headache, nausea, vomiting difficulty breathing go directly to ER  Chief Complaint     Chief Complaint   Patient presents with    Sore Throat     x 3 weeks         History of Present Illness   Modesta Bridges presents to the clinic c/o    This is a 25-year-old male here today with complaints of sore throat  He states he has sore throat for about 3 weeks  He has noticed some postnasal drip  He has also noticed that he has some white exudate on his tonsils  He has been having decreased appetite  He denies any fevers at this time  He does have nasal congestion but no sinus pressure  He also notes that he has a slight cough  He states he is having some thick mucus  He denies any recent travel  No influenza vaccine  No sick contacts    When asked if he had any shortness of breath or chest pain he  He states he has had intermittent chest pain  He has some pain when he lays down or when he exerts himself  He denies any chest pain the last 24 hours  He does had a family cardiac history  His grandfather has had heart issues  He denies any diabetes  Review of Systems   Review of Systems   Constitutional: Negative for activity change, chills, fatigue and fever  HENT: Positive for sore throat  Negative for congestion  Respiratory: Negative  Cardiovascular: Negative  Gastrointestinal: Negative  Musculoskeletal: Negative  Skin: Negative  Neurological: Negative  Psychiatric/Behavioral: Negative  Current Medications     Long-Term Medications   Medication Sig Dispense Refill    pantoprazole (PROTONIX) 20 mg tablet Take 1 tablet (20 mg total) by mouth daily as needed for heartburn for up to 30 days 30 tablet 2    tadalafil (CIALIS) 20 MG tablet Take 1 tablet (20 mg total) by mouth daily as needed for erectile dysfunction (Patient not taking: Reported on 3/14/2020) 10 tablet 0       Current Allergies     Allergies as of 03/14/2020 - Reviewed 03/14/2020   Allergen Reaction Noted    Penicillins Anaphylaxis 01/14/2013            The following portions of the patient's history were reviewed and updated as appropriate: allergies, current medications, past family history, past medical history, past social history, past surgical history and problem list     Objective   /72   Pulse 58   Temp 98 6 °F (37 °C)   Resp 16   Ht 6' 1" (1 854 m)   Wt 83 kg (183 lb)   SpO2 98%   BMI 24 14 kg/m²        Physical Exam     Physical Exam   Constitutional: He is oriented to person, place, and time  He appears well-developed and well-nourished  HENT:   Mouth/Throat: Uvula is midline and oropharynx is clear and moist  No posterior oropharyngeal edema or posterior oropharyngeal erythema  Tonsils are 0 on the right  Tonsils are 0 on the left  Tonsillar stone noted  Cardiovascular: Normal rate, regular rhythm and normal heart sounds  Neurological: He is alert and oriented to person, place, and time  Psychiatric: He has a normal mood and affect  His behavior is normal    Nursing note and vitals reviewed  Rapid strep negative

## 2020-03-14 NOTE — PATIENT INSTRUCTIONS
Start antibiotic  Take probiotic  OTC cough and cold medication as needed  Rapid strep negative  EKG shows sinus Rachel Simental  I would recommend you call your PCP on Monday for appointment in regards to episodes of chest pain  If this worsens, you develop chest pain, headache, nausea, vomiting difficulty breathing go directly to ER

## 2020-03-16 LAB
ATRIAL RATE: 48 BPM
P AXIS: 76 DEGREES
PR INTERVAL: 148 MS
QRS AXIS: 53 DEGREES
QRSD INTERVAL: 92 MS
QT INTERVAL: 428 MS
QTC INTERVAL: 382 MS
T WAVE AXIS: 61 DEGREES
VENTRICULAR RATE: 48 BPM

## 2020-03-16 PROCEDURE — 93010 ELECTROCARDIOGRAM REPORT: CPT | Performed by: INTERNAL MEDICINE

## 2020-07-06 ENCOUNTER — EVALUATION (OUTPATIENT)
Dept: PHYSICAL THERAPY | Facility: CLINIC | Age: 50
End: 2020-07-06
Payer: COMMERCIAL

## 2020-07-06 DIAGNOSIS — M43.10 SPONDYLOLISTHESIS, GRADE 1: Primary | ICD-10-CM

## 2020-07-06 PROCEDURE — 97162 PT EVAL MOD COMPLEX 30 MIN: CPT

## 2020-07-06 PROCEDURE — 97110 THERAPEUTIC EXERCISES: CPT

## 2020-07-06 NOTE — PROGRESS NOTES
PT Evaluation     Today's date: 2020  Patient name: Harry Fletcher  : 1970  MRN: [id number]  Referring provider: Diana Yuen MD  Dx: No diagnosis found  Assessment  Assessment details: Harry Fletcher is a 44 y o  male presenting to outpatient physical therapy with diagnosis of  LBP  [facility] Patient's current impairments include pain, impaired soft tissue mobility, reduced trunk  range of motion, reduced trunk  strength, reduced postural awareness, and reduced activity tolerance  Patient's present functional limitations include difficulty with ADLs with increased need for assistance, reliance on medication and/or modalities for pain relief, poor tolerance for functional mobility and activity, and difficulty completing work/HH responsibilities  Patient to benefit from skilled outpatient physical therapy 2x/week for 3 visits as per phys recs in order to reduce pain, maximize pain free range of motion, increase strength and stability, and improve functional mobility/functional activity in order to maximize return to prior level of function with reduced limitations  Thank you for your referral     Impairments: abnormal or restricted ROM, activity intolerance, impaired physical strength, lacks appropriate home exercise program and pain with function  Understanding of Dx/Px/POC: fair   Prognosis: good    Goals  STGs to be achieved in 4 weeks:  1  Pt to demonstrate reduced subjective pain rating "at worst" by at least 2-3 points from Initial Eval in order to allow for reduced pain with ADLs and improved functional activity tolerance  2  Pt to demonstrate increased AROM of lumbar spine to min or no restriction in order to allow for greater ease and independence with ADLs and functional mobility  3  Pt to demonstrate increased strength of trunk  by at least 1/2-1 grade in order to improve safety and stability with ADLs and functional mobility  LTGs to be achieved in 6-8 weeks:  1   Pt will be I with HEP in order to continue to improve quality of life and independence and reduce risk for re-injury  2  Pt to demonstrate return to painfree amb for at least 1/2 hour without limitations or restrictions  3  Pt to demonstrate improved function as noted by achieving or exceeding predicted score on FOTO outcomes assessment tool  Plan  Patient would benefit from: skilled physical therapy  Planned therapy interventions: manual therapy, therapeutic exercise, stretching, strengthening, home exercise program and abdominal trunk stabilization  Frequency: 2x week  Duration in visits: 3  Duration in weeks: 2  Plan of Care beginning date: 2020  Plan of Care expiration date: 2020  Treatment plan discussed with: patient        Subjective Evaluation    History of Present Illness  Mechanism of injury: Pain began several months ago, Pt noted with prolonged standing and walking he would get RLBP and pulling down into the RLE  Symptoms have been worsening and he now has B LBP with pulling into BLEs   Pain rad down posterior thighs to knees  Pain was intermittent when it started and is becoming more constant  Pt having diff walking and standing due to pain, noted after 5-10 min of walking  Pain is nagging at work            Recurrent probem    Quality of life: fair    Pain  Current pain ratin  At best pain ratin  At worst pain ratin  Quality: burning and pulling  Relieving factors: rest, change in position and medications (aleve)  Aggravating factors: standing and walking  Progression: worsening    Social Support  Steps to enter house: yes  Stairs in house: yes (to basement)   Lives in: [facility]  Lives with: spouse    Employment status: working ()  Hand dominance: right      Diagnostic Tests  X-ray: abnormal  Treatments  No previous or current treatments  Current treatment: medication and physical therapy  Patient Goals  Patient goals for therapy: decreased pain, increased strength, return to sport/leisure activities and increased motion  Patient goal: be able to walk w/o pain        Objective     Concurrent Complaints  Negative for disturbed sleep    Postural Observations  Seated posture: fair  Standing posture: fair    Additional Postural Observation Details  R scap elevated, L scap depressed, equal hip height  Forward head  R paraspinal area protruding further than L   + rib hump R w/FF    Palpation     Additional Palpation Details  No muscular tenderness to palpation    Tenderness     Right Hip   Tenderness in the PSIS  Neurological Testing     Sensation     Lumbar   Left   Intact: light touch, hot/cold discrimination and proprioception    Right   Intact: light touch, hot/cold discrimination and proprioception    Reflexes   Left   Patellar (L4): trace (1+)    Right   Patellar (L4): trace (1+)    Active Range of Motion     Lumbar   Flexion:  Restriction level: moderate  Extension:  Restriction level: moderate  Left lateral flexion:  Restriction level: minimal  Right lateral flexion:  Restriction level: minimal  Left rotation:  Restriction level: minimal  Right rotation:  Restriction level: minimal    Strength/Myotome Testing     Lumbar   Left   Heel walk: normal  Toe walk: normal    Right   Heel walk: normal  Toe walk: normal    Left Hip   Planes of Motion   Flexion: 5  Extension: 5  Abduction: 5  Adduction: 5    Right Hip   Planes of Motion   Flexion: 5  Extension: 5  Abduction: 5  Adduction: 5    Left Knee   Flexion: 5  Extension: 5    Right Knee   Flexion: 5  Extension: 5    Left Ankle/Foot   Dorsiflexion: 5  Plantar flexion: 5    Right Ankle/Foot   Dorsiflexion: 5  Plantar flexion: 5    Tests     Lumbar     Left   Negative passive SLR and slump test      Right   Negative passive SLR and slump test      Left Pelvic Girdle/Sacrum   Negative: active SLR test      Right Pelvic Girdle/Sacrum   Negative: active SLR test      Left Hip   Negative KYREE and long sit       Right Hip   Positive long sit  Negative KYREE  Additional Tests Details  Hams tight B at 65*  Supine leg length discrep with RLE approx 1/4 in longer, remains the same with long sitting   relief with RLE distraction  General Comments:    Lower quarter screen   Hips: unremarkable  Knees: unremarkable  Foot/ankle: unremarkable             Precautions: Grade 1 spondylolisthesis L5-S1    Re-eval Date: Pt to have 3 visits for HEP instr  Date 7/6       Visit Count 1       FOTO completed       Pain In        Pain Out                Manuals                                                                 Neuro Re-Ed                                                                                                        Ther Ex             Nustep             SKTC/DKTC 5x10"            LTRs 10x5"            pirif stretch             Ham stretch             AH             AH w alt UE/LE lift             AH w/ball lift             QP with alt UE/LE lift             QP cat/camel             Seated trunk rotation             Prayer stretch             AH w/SLR             bridges                                       Ther Activity                                       Gait Training                                       Modalities                                        7/6 Pt instructed in HEP as listed on flow sheet  Pt demonstrated good understanding

## 2020-07-06 NOTE — LETTER
2020    Ulysses Bird, MD  [facility]    Patient: Delroy Barriga   YOB: 1970   Date of Visit: 2020     Encounter Diagnosis     ICD-10-CM    1  Spondylolisthesis, grade 1 M43 10        Dear Dr Maureen Comer: Thank you for your recent referral of Dleroy Barriga  Please review the attached evaluation summary from Tay's recent visit  Please verify that you agree with the plan of care by signing the attached order  If you have any questions or concerns, please do not hesitate to call  I sincerely appreciate the opportunity to share in the care of one of your patients and hope to have another opportunity to work with you in the near future  Sincerely,    Manny Lowe, PT      Referring Provider:      I certify that I have read the below Plan of Care and certify the need for these services furnished under this plan of treatment while under my care  Ulysses Bird, MD  [address] [facility]: [phone]          PT Evaluation     Today's date: 2020  Patient name: Delroy Barriga  : 1970  MRN: [id number]  Referring provider: Lilliana Lopez MD  Dx: No diagnosis found  Assessment  Assessment details: Delroy Barriga is a 44 y o  male presenting to outpatient physical therapy with diagnosis of  LBP  [facility] Patient's current impairments include pain, impaired soft tissue mobility, reduced trunk  range of motion, reduced trunk  strength, reduced postural awareness, and reduced activity tolerance  Patient's present functional limitations include difficulty with ADLs with increased need for assistance, reliance on medication and/or modalities for pain relief, poor tolerance for functional mobility and activity, and difficulty completing work/HH responsibilities   Patient to benefit from skilled outpatient physical therapy 2x/week for 3 visits as per phys recs in order to reduce pain, maximize pain free range of motion, increase strength and stability, and improve functional mobility/functional activity in order to maximize return to prior level of function with reduced limitations  Thank you for your referral     Impairments: abnormal or restricted ROM, activity intolerance, impaired physical strength, lacks appropriate home exercise program and pain with function  Understanding of Dx/Px/POC: fair   Prognosis: good    Goals  STGs to be achieved in 4 weeks:  1  Pt to demonstrate reduced subjective pain rating "at worst" by at least 2-3 points from Initial Eval in order to allow for reduced pain with ADLs and improved functional activity tolerance  2  Pt to demonstrate increased AROM of lumbar spine to min or no restriction in order to allow for greater ease and independence with ADLs and functional mobility  3  Pt to demonstrate increased strength of trunk  by at least 1/2-1 grade in order to improve safety and stability with ADLs and functional mobility  LTGs to be achieved in 6-8 weeks:  1  Pt will be I with HEP in order to continue to improve quality of life and independence and reduce risk for re-injury  2  Pt to demonstrate return to painfree amb for at least 1/2 hour without limitations or restrictions  3  Pt to demonstrate improved function as noted by achieving or exceeding predicted score on FOTO outcomes assessment tool         Plan  Patient would benefit from: skilled physical therapy  Planned therapy interventions: manual therapy, therapeutic exercise, stretching, strengthening, home exercise program and abdominal trunk stabilization  Frequency: 2x week  Duration in visits: 3  Duration in weeks: 2  Plan of Care beginning date: 7/6/2020  Plan of Care expiration date: 8/6/2020  Treatment plan discussed with: patient        Subjective Evaluation    History of Present Illness  Mechanism of injury: Pain began several months ago, Pt noted with prolonged standing and walking he would get RLBP and pulling down into the RLE  Symptoms have been worsening and he now has B LBP with pulling into BLEs   Pain rad down posterior thighs to knees  Pain was intermittent when it started and is becoming more constant  Pt having diff walking and standing due to pain, noted after 5-10 min of walking  Pain is nagging at work  Recurrent probem    Quality of life: fair    Pain  Current pain ratin  At best pain ratin  At worst pain ratin  Quality: burning and pulling  Relieving factors: rest, change in position and medications (aleve)  Aggravating factors: standing and walking  Progression: worsening    Social Support  Steps to enter house: yes  Stairs in house: yes (to basement)   L[facility] in: [facility]  Lives with: spouse    Employment status: working ()  Hand dominance: right      Diagnostic Tests  X-ray: abnormal  Treatments  No previous or current treatments  Current treatment: medication and physical therapy  Patient Goals  Patient goals for therapy: decreased pain, increased strength, return to sport/leisure activities and increased motion  Patient goal: be able to walk w/o pain        Objective     Concurrent Complaints  Negative for disturbed sleep    Postural Observations  Seated posture: fair  Standing posture: fair    Additional Postural Observation Details  R scap elevated, L scap depressed, equal hip height  Forward head  R paraspinal area protruding further than L   + rib hump R w/FF    Palpation     Additional Palpation Details  No muscular tenderness to palpation    Tenderness     Right Hip   Tenderness in the PSIS       Neurological Testing     Sensation     Lumbar   Left   Intact: light touch, hot/cold discrimination and proprioception    Right   Intact: light touch, hot/cold discrimination and proprioception    Reflexes   Left   Patellar (L4): trace (1+)    Right   Patellar (L4): trace (1+)    Active Range of Motion     Lumbar   Flexion:  Restriction level: moderate  Extension:  Restriction level: moderate  Left lateral flexion:  Restriction level: minimal  Right lateral flexion:  Restriction level: minimal  Left rotation:  Restriction level: minimal  Right rotation:  Restriction level: minimal    Strength/Myotome Testing     Lumbar   Left   Heel walk: normal  Toe walk: normal    Right   Heel walk: normal  Toe walk: normal    Left Hip   Planes of Motion   Flexion: 5  Extension: 5  Abduction: 5  Adduction: 5    Right Hip   Planes of Motion   Flexion: 5  Extension: 5  Abduction: 5  Adduction: 5    Left Knee   Flexion: 5  Extension: 5    Right Knee   Flexion: 5  Extension: 5    Left Ankle/Foot   Dorsiflexion: 5  Plantar flexion: 5    Right Ankle/Foot   Dorsiflexion: 5  Plantar flexion: 5    Tests     Lumbar     Left   Negative passive SLR and slump test      Right   Negative passive SLR and slump test      Left Pelvic Girdle/Sacrum   Negative: active SLR test      Right Pelvic Girdle/Sacrum   Negative: active SLR test      Left Hip   Negative KYREE and long sit  Right Hip   Positive long sit  Negative KYREE  Additional Tests Details  Hams tight B at 65*  Supine leg length discrep with RLE approx 1/4 in longer, remains the same with long sitting   relief with RLE distraction  General Comments:    Lower quarter screen   Hips: unremarkable  Knees: unremarkable  Foot/ankle: unremarkable             Precautions: Grade 1 spondylolisthesis L5-S1    Re-eval Date: Pt to have 3 visits for HEP instr      Date 7/6       Visit Count 1       FOTO completed       Pain In        Pain Out                Manuals                                                                 Neuro Re-Ed                                                                                                        Ther Ex             Nustep             SKTC/DKTC 5x10"            LTRs 10x5"            pirif stretch             Ham stretch             AH             AH w alt UE/LE lift             AH w/ball lift             QP with alt UE/LE lift             QP cat/camel             Seated trunk rotation             Prayer stretch             AH w/SLR             bridges                                       Ther Activity                                       Gait Training                                       Modalities                                        7/6 Pt instructed in HEP as listed on flow sheet  Pt demonstrated good understanding

## 2020-07-07 ENCOUNTER — TRANSCRIBE ORDERS (OUTPATIENT)
Dept: PHYSICAL THERAPY | Facility: CLINIC | Age: 50
End: 2020-07-07

## 2020-07-07 DIAGNOSIS — M43.10 SPONDYLOLISTHESIS, GRADE 1: Primary | ICD-10-CM

## 2020-07-10 ENCOUNTER — OFFICE VISIT (OUTPATIENT)
Dept: PHYSICAL THERAPY | Facility: CLINIC | Age: 50
End: 2020-07-10
Payer: COMMERCIAL

## 2020-07-10 DIAGNOSIS — M43.10 SPONDYLOLISTHESIS, GRADE 1: Primary | ICD-10-CM

## 2020-07-10 PROCEDURE — 97110 THERAPEUTIC EXERCISES: CPT

## 2020-07-10 NOTE — PROGRESS NOTES
Daily Note     Today's date: 7/10/2020  Patient name: Laura Stovall  : 1970  MRN: [id number]  Referring provider: Ita Nation MD  Dx:   Encounter Diagnosis     ICD-10-CM    1  Spondylolisthesis, grade 1 M43 10                   Subjective: Increase LBP/burning discomfort 1* with walking  Sitting sometimes relieves discomfort      Objective: See treatment diary below      Assessment: Tolerated treatment well  Denied any increase LBP/sx's  Pt demonstrates inflex BL LE  Pt progressed with POC with 1* focus on HEP as limited with rx sessions per MD script  Patient would benefit from continued PT      Plan: Continue per plan of care  Precautions: Grade 1 spondylolisthesis L5-S1    Re-eval Date: Pt to have 3 visits for HEP instr      Date 7/6 7/10      Visit Count 1 2      FOTO completed       Pain In  2      Pain Out  "better"          Manuals                Neuro Re-Ed                Ther Ex        Nustep  Nustep  L3 10 min      SKTC/DKTC 5x10" 3x30" to koko ea      LTRs 10x5" 3x 30" to koko ea dir      pirif stretch  2x 1 min  supine      Ham stretch  2x 1 min  supine      AH  Supine  10x 5"      AH w alt UE/LE lift  Supine / Seated  10x 5"      AH w/ball lift        QP with alt UE/LE lift        QP cat/camel        Seated trunk rotation        Prayer stretch        AH w/SLR        bridges  10x 5" w/AH                      Ther Activity                        Gait Training                        Modalities                        * HEP  7/10/20  AH, UE, LE, ALT UE/LE, bridges, self stretch HS/pirif - PTA/CV

## 2020-07-14 ENCOUNTER — OFFICE VISIT (OUTPATIENT)
Dept: PHYSICAL THERAPY | Facility: CLINIC | Age: 50
End: 2020-07-14
Payer: COMMERCIAL

## 2020-07-14 DIAGNOSIS — M43.10 SPONDYLOLISTHESIS, GRADE 1: Primary | ICD-10-CM

## 2020-07-14 PROCEDURE — 97110 THERAPEUTIC EXERCISES: CPT

## 2020-07-14 NOTE — PROGRESS NOTES
Daily Note     Today's date: 2020  Patient name: Dee Mann  : 1970  MRN: [id number]  Referring provider: Cristal Barragan MD  Dx:   Encounter Diagnosis     ICD-10-CM    1  Spondylolisthesis, grade 1 M43 10                   Subjective:  No new c/o thus far re: LB region  Objective: See treatment diary below      Assessment: Tolerated treatment Fairly Well to Well overall with performance and tolerance to ther exer  Plan: Con't services 2x/week  Precautions: Grade 1 spondylolisthesis L5-S1    Re-eval Date: Pt to have 3 visits for HEP instr      Date 7/6 7/10 7 14 20     Visit Count 1 2 3     FOTO completed       Pain In  2 1/10     Pain Out  "better" 0-1/10         Manuals    20             Neuro Re-Ed                Ther Ex    14 20     Nustep  Nustep  L3 10 min Nustep  L3 10 min     SKTC/DKTC 5x10" 3x30" to koko ea 3x30" to koko ea       LTRs 10x5" 3x 30" to koko ea dir 3x30" to koko ea     pirif stretch  2x 1 min  supine 2x 1 min  supine     Ham stretch  2x 1 min  supine 2x 1 min  supine     AH  Supine  10x 5" Supine  10x 5"     AH w alt UE/LE lift  Supine / Seated  10x 5" Supine / Seated  10x 5"     AH w/ball lift   **1x15/5"     QP with alt UE/LE lift        QP cat/camel        Seated trunk rotation   **1x20/5"     Prayer stretch        AH w/SLR   **B 1x10     bridges  10x 5" w/AH 10x 5" w/AH                     Ther Activity                        Gait Training                        Modalities                        * HEP  7/10/20  AH, UE, LE, ALT UE/LE, bridges, self stretch HS/pirif - PTA/CV

## 2020-07-17 ENCOUNTER — OFFICE VISIT (OUTPATIENT)
Dept: PHYSICAL THERAPY | Facility: CLINIC | Age: 50
End: 2020-07-17
Payer: COMMERCIAL

## 2020-07-17 DIAGNOSIS — M43.10 SPONDYLOLISTHESIS, GRADE 1: Primary | ICD-10-CM

## 2020-07-17 PROCEDURE — 97535 SELF CARE MNGMENT TRAINING: CPT

## 2020-07-17 PROCEDURE — 97110 THERAPEUTIC EXERCISES: CPT

## 2020-07-17 NOTE — PROGRESS NOTES
Daily Note     Today's date: 2020  Patient name: Ave Duverney  : 1970  MRN: [id number]  Referring provider: Kellen Pollard MD  Dx:   Encounter Diagnosis     ICD-10-CM    1  Spondylolisthesis, grade 1 M43 10                   Subjective: No complaints or concerns voiced by pt today  Objective: See treatment diary below  *Reviewed ther exer and stretching for HEP  Assessment: Tolerated treatment Fairly Well to Well overall with performance and tolerance to ther exer  Plan: Pt  says he was recommended by his MD to attend PT for 3-4 treatments, then con't with HEP  Will D/C services post today's treatment session, as per his discretion  Will return for future services if the need arises  Precautions: Grade 1 spondylolisthesis L5-S1    Re-eval Date: Pt to have 3 visits for HEP instr      Date 7/6 7/10 7 14 20 7 17 20    Visit Count 1 2 3 4    FOTO completed   Completed    Pain In  2 1/10 0/10    Pain Out  "better" 0-1/10 0/10        Manuals   7 14 20 7 17 20            Neuro Re-Ed                Ther Ex   7 14 20 7 17 20    Nustep  Nustep  L3 10 min Nustep  L3 10 min Nustep  L4 10 min    SKTC/DKTC 5x10" 3x30" to koko ea 3x30" to koko ea   3x30" to koko ea    LTRs 10x5" 3x 30" to koko ea dir 3x30" to koko ea 3x30" to koko ea    pirif stretch  2x 1 min  supine 2x 1 min  supine 2x 1 min  supine    Ham stretch  2x 1 min  supine 2x 1 min  supine 2x 1 min  supine    AH  Supine  10x 5" Supine  10x 5" Supine  10x 5"    AH w alt UE/LE lift  Supine / Seated  10x 5" Supine  10x 5" Supine  15x 5"    AH w/ball lift   **1x15/5" 1x15/5"    QP with alt UE/LE lift        QP cat/camel        Seated trunk rotation   **1x20/5" 1x20/5"    Prayer stretch        AH w/SLR   **B 1x10 B 1x15    bridges  10x 5" w/AH 10x 5" w/AH 10x 5" w/AH    Self Care    **Reviewed all exercises and stretches for HEP  10 min            Ther Activity                        Gait Training                        Modalities * HEP  7/10/20  AH, UE, LE, ALT UE/LE, bridges, self stretch HS/pirif - PTA/CV

## 2020-07-22 ENCOUNTER — OFFICE VISIT (OUTPATIENT)
Dept: FAMILY MEDICINE CLINIC | Facility: CLINIC | Age: 50
End: 2020-07-22
Payer: COMMERCIAL

## 2020-07-22 VITALS
BODY MASS INDEX: 23.72 KG/M2 | SYSTOLIC BLOOD PRESSURE: 130 MMHG | OXYGEN SATURATION: 95 % | HEIGHT: 73 IN | WEIGHT: 179 LBS | TEMPERATURE: 99.2 F | HEART RATE: 64 BPM | DIASTOLIC BLOOD PRESSURE: 70 MMHG

## 2020-07-22 DIAGNOSIS — Z00.00 WELL ADULT EXAM: ICD-10-CM

## 2020-07-22 DIAGNOSIS — R79.89 LOW TESTOSTERONE: ICD-10-CM

## 2020-07-22 DIAGNOSIS — F52.21 ERECTILE DYSFUNCTION OF NON-ORGANIC ORIGIN: ICD-10-CM

## 2020-07-22 DIAGNOSIS — D22.4 NEVUS OF SCALP: Primary | ICD-10-CM

## 2020-07-22 PROCEDURE — 1036F TOBACCO NON-USER: CPT | Performed by: FAMILY MEDICINE

## 2020-07-22 PROCEDURE — 3008F BODY MASS INDEX DOCD: CPT | Performed by: FAMILY MEDICINE

## 2020-07-22 PROCEDURE — 99213 OFFICE O/P EST LOW 20 MIN: CPT | Performed by: FAMILY MEDICINE

## 2020-07-22 RX ORDER — PANTOPRAZOLE SODIUM 20 MG/1
20 TABLET, DELAYED RELEASE ORAL DAILY PRN
Qty: 30 TABLET | Refills: 2 | Status: SHIPPED | OUTPATIENT
Start: 2020-07-22 | End: 2022-02-28 | Stop reason: SDUPTHER

## 2020-07-22 RX ORDER — TADALAFIL 20 MG/1
20 TABLET ORAL DAILY PRN
Qty: 10 TABLET | Refills: 0 | Status: SHIPPED | OUTPATIENT
Start: 2020-07-22 | End: 2021-03-25 | Stop reason: SDUPTHER

## 2020-07-22 NOTE — PROGRESS NOTES
Assessment/Plan:  Refills on medications prescribed  Considering new onset of hyper pigmented lesion that is raised recommended dermatology evaluation  Our staff will call dermatology and get him scheduled to be seen for further evaluation and likely need for biopsy  Patient agrees to schedule  Card given  No problem-specific Assessment & Plan notes found for this encounter  Diagnoses and all orders for this visit:    Nevus of scalp    Well adult exam  -     pantoprazole (PROTONIX) 20 mg tablet; Take 1 tablet (20 mg total) by mouth daily as needed for heartburn    Low testosterone  -     pantoprazole (PROTONIX) 20 mg tablet; Take 1 tablet (20 mg total) by mouth daily as needed for heartburn    Erectile dysfunction of non-organic origin  -     pantoprazole (PROTONIX) 20 mg tablet; Take 1 tablet (20 mg total) by mouth daily as needed for heartburn  -     tadalafil (CIALIS) 20 MG tablet; Take 1 tablet (20 mg total) by mouth daily as needed for erectile dysfunction          Subjective:      Patient ID: Clyda Bamberger is a 44 y o  male  Patient with concern about a new nevus present to the right scalp just above the ear  This is not itchy but he has noticed it has changed recently  He also needs refills on his medications  He is otherwise feeling well  The following portions of the patient's history were reviewed and updated as appropriate: allergies, current medications, past family history, past medical history, past social history, past surgical history and problem list     Review of Systems   Constitutional: Negative  HENT: Negative  Eyes: Negative  Respiratory: Negative  Cardiovascular: Negative  Gastrointestinal: Negative  Endocrine: Negative  Genitourinary: Negative  Musculoskeletal: Negative  Skin: Negative  Nevus present to the right scalp   Allergic/Immunologic: Negative  Neurological: Negative  Hematological: Negative      Psychiatric/Behavioral: Negative  Objective:      /70 (BP Location: Left arm, Patient Position: Sitting, Cuff Size: Adult)   Pulse 64   Temp 99 2 °F (37 3 °C)   Ht 6' 0 84" (1 85 m)   Wt 81 2 kg (179 lb)   SpO2 95%   BMI 23 72 kg/m²          Physical Exam   Constitutional: He is oriented to person, place, and time  He appears well-developed and well-nourished  HENT:   Head: Normocephalic and atraumatic  Right Ear: External ear normal  Tympanic membrane is not erythematous and not bulging  Left Ear: External ear normal  Tympanic membrane is not erythematous and not bulging  Nose: Nose normal    Mouth/Throat: Oropharynx is clear and moist and mucous membranes are normal  No oral lesions  No oropharyngeal exudate  Eyes: Conjunctivae and EOM are normal  Right eye exhibits no discharge  Left eye exhibits no discharge  No scleral icterus  Neck: Normal range of motion  Neck supple  No thyromegaly present  Cardiovascular: Normal rate, regular rhythm and normal heart sounds  Exam reveals no gallop and no friction rub  No murmur heard  Pulmonary/Chest: Effort normal  No respiratory distress  He has no wheezes  He has no rales  He exhibits no tenderness  Abdominal: Soft  Bowel sounds are normal  He exhibits no distension and no mass  There is no tenderness  There is no rebound and no guarding  Musculoskeletal: Normal range of motion  He exhibits no edema, tenderness or deformity  Lymphadenopathy:     He has no cervical adenopathy  Neurological: He is alert and oriented to person, place, and time  He has normal reflexes  No cranial nerve deficit  He exhibits normal muscle tone  Coordination normal    Skin: Skin is warm and dry  No rash noted  No erythema  No pallor  Scalp just above the right ear shows well-circumscribed raised annular hyperpigmented lesion with extension of raised vesicular patch down toward the ear with no hyperpigmentation  Psychiatric: He has a normal mood and affect   His behavior is normal    Vitals reviewed

## 2020-08-27 NOTE — PROGRESS NOTES
PT Discharge    Today's date: 2020  Patient name: Latrell Hankins  : 1970  MRN: [id number]  Referring provider: Tevin Buckner MD  Dx:   Encounter Diagnosis     ICD-10-CM    1  Spondylolisthesis, grade 1  M43 10        Start Time: 0800  Stop Time: 0900  Total time in clinic (min): 60 minutes    Assessment  Assessment details: Latrell Hankins is a 44 y o  male presenting to outpatient physical therapy with diagnosis of  LBP  Roxana Thornton Patient's current impairments include pain, impaired soft tissue mobility, reduced trunk  range of motion, reduced trunk  strength, reduced postural awareness, and reduced activity tolerance  Patient's present functional limitations include difficulty with ADLs with increased need for assistance, reliance on medication and/or modalities for pain relief, poor tolerance for functional mobility and activity, and difficulty completing work/HH responsibilities  Patient to benefit from skilled outpatient physical therapy 2x/week for 3 visits as per phys recs in order to reduce pain, maximize pain free range of motion, increase strength and stability, and improve functional mobility/functional activity in order to maximize return to prior level of function with reduced limitations  Thank you for your referral     * UPDATE: Pt was only to attend PT for 3-4 visits in order to become indep with HEP  Pt has completed 4 visits and is indep with HEP  DC PT due to same  Impairments: abnormal or restricted ROM, activity intolerance, impaired physical strength, lacks appropriate home exercise program and pain with function  Understanding of Dx/Px/POC: fair   Prognosis: good    Goals  STGs to be achieved in 4 weeks:  1  Pt to demonstrate reduced subjective pain rating "at worst" by at least 2-3 points from Initial Eval in order to allow for reduced pain with ADLs and improved functional activity tolerance  MET  2   Pt to demonstrate increased AROM of lumbar spine to min or no restriction in order to allow for greater ease and independence with ADLs and functional mobility  CONTINUED  3  Pt to demonstrate increased strength of trunk  by at least 1/2-1 grade in order to improve safety and stability with ADLs and functional mobility  CONT'D    LTGs to be achieved in 6-8 weeks:  1  Pt will be I with HEP in order to continue to improve quality of life and independence and reduce risk for re-injury  MET  2  Pt to demonstrate return to painfree amb for at least 1/2 hour without limitations or restrictions  CONT'D  3  Pt to demonstrate improved function as noted by achieving or exceeding predicted score on FOTO outcomes assessment tool  Plan  Plan details: DC PT        Subjective Evaluation    History of Present Illness  Mechanism of injury: Pain began several months ago, Pt noted with prolonged standing and walking he would get RLBP and pulling down into the RLE  Symptoms have been worsening and he now has B LBP with pulling into BLEs   Pain rad down posterior thighs to knees  Pain was intermittent when it started and is becoming more constant  Pt having diff walking and standing due to pain, noted after 5-10 min of walking  Pain is nagging at work            Recurrent probem    Quality of life: fair    Pain  Current pain ratin  At best pain ratin  At worst pain ratin  Quality: burning and pulling  Relieving factors: rest, change in position and medications (aleve)  Aggravating factors: standing and walking  Progression: worsening    Social Support  Steps to enter house: yes  Stairs in house: yes (to basement)   L[facility] in: [facility]  Lives with: spouse    Employment status: working ()  Hand dominance: right      Diagnostic Tests  X-ray: abnormal  Treatments  No previous or current treatments  Current treatment: medication and physical therapy  Patient Goals  Patient goals for therapy: decreased pain, increased strength, return to sport/leisure activities and increased motion  Patient goal: be able to walk w/o pain        Objective     Concurrent Complaints  Negative for disturbed sleep    Postural Observations  Seated posture: fair  Standing posture: fair    Additional Postural Observation Details  R scap elevated, L scap depressed, equal hip height  Forward head  R paraspinal area protruding further than L   + rib hump R w/FF    Palpation     Additional Palpation Details  No muscular tenderness to palpation    Tenderness     Right Hip   Tenderness in the PSIS  Neurological Testing     Sensation     Lumbar   Left   Intact: light touch, hot/cold discrimination and proprioception    Right   Intact: light touch, hot/cold discrimination and proprioception    Reflexes   Left   Patellar (L4): trace (1+)    Right   Patellar (L4): trace (1+)    Active Range of Motion     Lumbar   Flexion:  Restriction level: moderate  Extension:  Restriction level: moderate  Left lateral flexion:  Restriction level: minimal  Right lateral flexion:  Restriction level: minimal  Left rotation:  Restriction level: minimal  Right rotation:  Restriction level: minimal    Strength/Myotome Testing     Lumbar   Left   Heel walk: normal  Toe walk: normal    Right   Heel walk: normal  Toe walk: normal    Left Hip   Planes of Motion   Flexion: 5  Extension: 5  Abduction: 5  Adduction: 5    Right Hip   Planes of Motion   Flexion: 5  Extension: 5  Abduction: 5  Adduction: 5    Left Knee   Flexion: 5  Extension: 5    Right Knee   Flexion: 5  Extension: 5    Left Ankle/Foot   Dorsiflexion: 5  Plantar flexion: 5    Right Ankle/Foot   Dorsiflexion: 5  Plantar flexion: 5    Tests     Lumbar     Left   Negative passive SLR and slump test      Right   Negative passive SLR and slump test      Left Pelvic Girdle/Sacrum   Negative: active SLR test      Right Pelvic Girdle/Sacrum   Negative: active SLR test      Left Hip   Negative KYREE and long sit  Right Hip   Positive long sit  Negative KYREE       Additional Tests Details  Hams tight B at 65*  Supine leg length discrep with RLE approx 1/4 in longer, remains the same with long sitting   relief with RLE distraction  General Comments:    Lower quarter screen   Hips: unremarkable  Knees: unremarkable  Foot/ankle: unremarkable             Precautions: Grade 1 spondylolisthesis L5-S1    Re-eval Date: Pt to have 3 visits for HEP instr

## 2021-03-25 ENCOUNTER — OFFICE VISIT (OUTPATIENT)
Dept: FAMILY MEDICINE CLINIC | Facility: CLINIC | Age: 51
End: 2021-03-25
Payer: COMMERCIAL

## 2021-03-25 VITALS
HEART RATE: 59 BPM | TEMPERATURE: 97.9 F | BODY MASS INDEX: 23.64 KG/M2 | DIASTOLIC BLOOD PRESSURE: 72 MMHG | HEIGHT: 73 IN | OXYGEN SATURATION: 96 % | SYSTOLIC BLOOD PRESSURE: 108 MMHG | WEIGHT: 178.4 LBS

## 2021-03-25 DIAGNOSIS — Z12.5 SCREENING FOR PROSTATE CANCER: ICD-10-CM

## 2021-03-25 DIAGNOSIS — F52.21 ERECTILE DYSFUNCTION OF NON-ORGANIC ORIGIN: ICD-10-CM

## 2021-03-25 DIAGNOSIS — R53.83 FATIGUE, UNSPECIFIED TYPE: ICD-10-CM

## 2021-03-25 DIAGNOSIS — Z00.00 WELL ADULT EXAM: Primary | ICD-10-CM

## 2021-03-25 DIAGNOSIS — R79.89 LOW TESTOSTERONE: ICD-10-CM

## 2021-03-25 DIAGNOSIS — G47.19 DAYTIME HYPERSOMNOLENCE: ICD-10-CM

## 2021-03-25 PROCEDURE — 1036F TOBACCO NON-USER: CPT | Performed by: FAMILY MEDICINE

## 2021-03-25 PROCEDURE — 99396 PREV VISIT EST AGE 40-64: CPT | Performed by: FAMILY MEDICINE

## 2021-03-25 PROCEDURE — 3008F BODY MASS INDEX DOCD: CPT | Performed by: FAMILY MEDICINE

## 2021-03-25 PROCEDURE — 3725F SCREEN DEPRESSION PERFORMED: CPT | Performed by: FAMILY MEDICINE

## 2021-03-25 RX ORDER — SILDENAFIL 100 MG/1
100 TABLET, FILM COATED ORAL DAILY PRN
Qty: 9 TABLET | Refills: 2 | Status: SHIPPED | OUTPATIENT
Start: 2021-03-25

## 2021-03-25 RX ORDER — TADALAFIL 20 MG/1
20 TABLET ORAL DAILY PRN
Qty: 10 TABLET | Refills: 4 | Status: SHIPPED | OUTPATIENT
Start: 2021-03-25 | End: 2022-02-28 | Stop reason: SDUPTHER

## 2021-03-25 NOTE — PROGRESS NOTES
Assessment/Plan:  Recommend sleep study and lab testing  Await lab results  Recommend regular exercise and good diet  Patient is up-to-date on colon cancer screening  Recommend recheck again in 1 year   And sooner if needed  1  Well adult exam    2  Fatigue, unspecified type    3  Daytime hypersomnolence    4  Low testosterone    5  Erectile dysfunction of non-organic origin          Subjective:      Patient ID: Timothy Mejia is a 44 y o  male  Patient here for well check  He does note daytime fatigue and hypersomnolence  Onset  Of current symptoms was several months ago  He denies any chest pain or shortness of breath  He is otherwise active in eating well  BMI Counseling: Body mass index is 23 54 kg/m²  The BMI is above normal  Nutrition recommendations include decreasing portion sizes  Exercise recommendations include moderate physical activity 150 minutes/week  Depression Screening and Follow-up Plan: Patient's depression screening was positive with a PHQ-2 score of 0  Their PHQ-9 score was 4  Clincally patient does not have depression  No treatment is required  The following portions of the patient's history were reviewed and updated as appropriate: allergies, current medications, past family history, past medical history, past social history, past surgical history, and problem list     Review of Systems   Constitutional: Negative  HENT: Negative  Eyes: Negative  Respiratory: Negative  Cardiovascular: Negative  Gastrointestinal: Negative  Endocrine: Negative  Genitourinary: Negative  Musculoskeletal: Negative  Skin: Negative  Allergic/Immunologic: Negative  Neurological: Negative  Hematological: Negative  Psychiatric/Behavioral: Negative            Objective:      /72 (BP Location: Left arm, Patient Position: Sitting, Cuff Size: Adult)   Pulse 59   Temp 97 9 °F (36 6 °C) (Temporal)   Ht 6' 1" (1 854 m)   Wt 80 9 kg (178 lb 6 4 oz) SpO2 96%   BMI 23 54 kg/m²          Physical Exam  Vitals signs reviewed  Constitutional:       Appearance: He is well-developed  HENT:      Head: Normocephalic and atraumatic  Right Ear: External ear normal  Tympanic membrane is not erythematous or bulging  Left Ear: External ear normal  Tympanic membrane is not erythematous or bulging  Nose: Nose normal       Mouth/Throat:      Mouth: No oral lesions  Pharynx: No oropharyngeal exudate  Eyes:      General: No scleral icterus  Right eye: No discharge  Left eye: No discharge  Conjunctiva/sclera: Conjunctivae normal    Neck:      Musculoskeletal: Normal range of motion and neck supple  Thyroid: No thyromegaly  Cardiovascular:      Rate and Rhythm: Normal rate and regular rhythm  Heart sounds: Normal heart sounds  No murmur  No friction rub  No gallop  Pulmonary:      Effort: Pulmonary effort is normal  No respiratory distress  Breath sounds: No wheezing or rales  Chest:      Chest wall: No tenderness  Abdominal:      General: Bowel sounds are normal  There is no distension  Palpations: Abdomen is soft  There is no mass  Tenderness: There is no abdominal tenderness  There is no guarding or rebound  Musculoskeletal: Normal range of motion  General: No tenderness or deformity  Lymphadenopathy:      Cervical: No cervical adenopathy  Skin:     General: Skin is warm and dry  Coloration: Skin is not pale  Findings: No erythema or rash  Neurological:      Mental Status: He is alert and oriented to person, place, and time  Cranial Nerves: No cranial nerve deficit  Motor: No abnormal muscle tone  Coordination: Coordination normal       Deep Tendon Reflexes: Reflexes are normal and symmetric     Psychiatric:         Behavior: Behavior normal

## 2021-03-25 NOTE — LETTER
March 25, 2021     Patient: Marie Brewer   YOB: 1970   Date of Visit: 3/25/2021       To Whom it May Concern:    Iona Calles is under my professional care  He was seen in my office on 3/25/2021  He may return to work on 03/29/2021  If you have any questions or concerns, please don't hesitate to call           Sincerely,          Sofy Mckeon DO        CC: No Recipients

## 2021-03-25 NOTE — PROGRESS NOTES
BMI Counseling: Body mass index is 23 54 kg/m²  The BMI is above normal  Nutrition recommendations include reducing portion sizes

## 2021-03-30 ENCOUNTER — APPOINTMENT (OUTPATIENT)
Dept: LAB | Facility: CLINIC | Age: 51
End: 2021-03-30
Payer: COMMERCIAL

## 2021-03-30 DIAGNOSIS — Z12.5 SCREENING FOR PROSTATE CANCER: ICD-10-CM

## 2021-03-30 DIAGNOSIS — G47.19 DAYTIME HYPERSOMNOLENCE: ICD-10-CM

## 2021-03-30 DIAGNOSIS — R53.83 FATIGUE, UNSPECIFIED TYPE: ICD-10-CM

## 2021-03-30 DIAGNOSIS — R79.89 LOW TESTOSTERONE: ICD-10-CM

## 2021-03-30 LAB
ALBUMIN SERPL BCP-MCNC: 4 G/DL (ref 3.5–5)
ALP SERPL-CCNC: 59 U/L (ref 46–116)
ALT SERPL W P-5'-P-CCNC: 31 U/L (ref 12–78)
ANION GAP SERPL CALCULATED.3IONS-SCNC: 5 MMOL/L (ref 4–13)
AST SERPL W P-5'-P-CCNC: 16 U/L (ref 5–45)
BASOPHILS # BLD AUTO: 0.02 THOUSANDS/ΜL (ref 0–0.1)
BASOPHILS NFR BLD AUTO: 0 % (ref 0–1)
BILIRUB SERPL-MCNC: 0.83 MG/DL (ref 0.2–1)
BUN SERPL-MCNC: 16 MG/DL (ref 5–25)
CALCIUM SERPL-MCNC: 9.1 MG/DL (ref 8.3–10.1)
CHLORIDE SERPL-SCNC: 106 MMOL/L (ref 100–108)
CHOLEST SERPL-MCNC: 179 MG/DL (ref 50–200)
CO2 SERPL-SCNC: 29 MMOL/L (ref 21–32)
CREAT SERPL-MCNC: 1.17 MG/DL (ref 0.6–1.3)
EOSINOPHIL # BLD AUTO: 0.14 THOUSAND/ΜL (ref 0–0.61)
EOSINOPHIL NFR BLD AUTO: 3 % (ref 0–6)
ERYTHROCYTE [DISTWIDTH] IN BLOOD BY AUTOMATED COUNT: 13.5 % (ref 11.6–15.1)
GFR SERPL CREATININE-BSD FRML MDRD: 71 ML/MIN/1.73SQ M
GLUCOSE P FAST SERPL-MCNC: 93 MG/DL (ref 65–99)
HCT VFR BLD AUTO: 42.2 % (ref 36.5–49.3)
HDLC SERPL-MCNC: 57 MG/DL
HGB BLD-MCNC: 14.2 G/DL (ref 12–17)
IMM GRANULOCYTES # BLD AUTO: 0.01 THOUSAND/UL (ref 0–0.2)
IMM GRANULOCYTES NFR BLD AUTO: 0 % (ref 0–2)
LDLC SERPL CALC-MCNC: 108 MG/DL (ref 0–100)
LYMPHOCYTES # BLD AUTO: 1.64 THOUSANDS/ΜL (ref 0.6–4.47)
LYMPHOCYTES NFR BLD AUTO: 30 % (ref 14–44)
MCH RBC QN AUTO: 30.2 PG (ref 26.8–34.3)
MCHC RBC AUTO-ENTMCNC: 33.6 G/DL (ref 31.4–37.4)
MCV RBC AUTO: 90 FL (ref 82–98)
MONOCYTES # BLD AUTO: 0.55 THOUSAND/ΜL (ref 0.17–1.22)
MONOCYTES NFR BLD AUTO: 10 % (ref 4–12)
NEUTROPHILS # BLD AUTO: 3.04 THOUSANDS/ΜL (ref 1.85–7.62)
NEUTS SEG NFR BLD AUTO: 57 % (ref 43–75)
NRBC BLD AUTO-RTO: 0 /100 WBCS
PLATELET # BLD AUTO: 192 THOUSANDS/UL (ref 149–390)
PMV BLD AUTO: 10.7 FL (ref 8.9–12.7)
POTASSIUM SERPL-SCNC: 4.5 MMOL/L (ref 3.5–5.3)
PROT SERPL-MCNC: 7.5 G/DL (ref 6.4–8.2)
PSA SERPL-MCNC: 1 NG/ML (ref 0–4)
RBC # BLD AUTO: 4.7 MILLION/UL (ref 3.88–5.62)
SODIUM SERPL-SCNC: 140 MMOL/L (ref 136–145)
TESTOST SERPL-MCNC: 413 NG/DL (ref 95–948)
TRIGL SERPL-MCNC: 68 MG/DL
TSH SERPL DL<=0.05 MIU/L-ACNC: 1.17 UIU/ML (ref 0.36–3.74)
WBC # BLD AUTO: 5.4 THOUSAND/UL (ref 4.31–10.16)

## 2021-03-30 PROCEDURE — G0103 PSA SCREENING: HCPCS

## 2021-03-30 PROCEDURE — 36415 COLL VENOUS BLD VENIPUNCTURE: CPT | Performed by: FAMILY MEDICINE

## 2021-03-30 PROCEDURE — 85025 COMPLETE CBC W/AUTO DIFF WBC: CPT | Performed by: FAMILY MEDICINE

## 2021-03-30 PROCEDURE — 84403 ASSAY OF TOTAL TESTOSTERONE: CPT

## 2021-03-30 PROCEDURE — 84443 ASSAY THYROID STIM HORMONE: CPT

## 2021-03-30 PROCEDURE — 80053 COMPREHEN METABOLIC PANEL: CPT | Performed by: FAMILY MEDICINE

## 2021-03-30 PROCEDURE — 80061 LIPID PANEL: CPT | Performed by: FAMILY MEDICINE

## 2021-04-06 ENCOUNTER — TELEPHONE (OUTPATIENT)
Dept: FAMILY MEDICINE CLINIC | Facility: CLINIC | Age: 51
End: 2021-04-06

## 2021-04-30 ENCOUNTER — TELEPHONE (OUTPATIENT)
Dept: FAMILY MEDICINE CLINIC | Facility: CLINIC | Age: 51
End: 2021-04-30

## 2021-04-30 NOTE — TELEPHONE ENCOUNTER
Patient needs a prior authorization for a sleep study on 5/4/21 @ [address]   CPT:  32406  NPI: [id number]

## 2021-05-04 NOTE — TELEPHONE ENCOUNTER
Prior auth for sleep study started today through [address] [place] [address]  Called Sleep lab to advise that this will have to be r/s as we will not have a decision today

## 2021-05-06 ENCOUNTER — TELEPHONE (OUTPATIENT)
Dept: FAMILY MEDICINE CLINIC | Facility: CLINIC | Age: 51
End: 2021-05-06

## 2021-05-06 NOTE — TELEPHONE ENCOUNTER
Prior auth for sleep study denied  Pt can have a home sleep study without a prior auth  Please advise your recommendations  Thank you

## 2021-05-07 NOTE — TELEPHONE ENCOUNTER
Pt verbalized understanding of recommendations  No further questions/concerns  Pt given phone number to [facility] sleep medicine and he will call to schedule

## 2021-05-12 ENCOUNTER — TRANSCRIBE ORDERS (OUTPATIENT)
Dept: ADMINISTRATIVE | Facility: HOSPITAL | Age: 51
End: 2021-05-12

## 2021-05-12 DIAGNOSIS — R53.83 FATIGUE, UNSPECIFIED TYPE: Primary | ICD-10-CM

## 2021-05-17 NOTE — PROGRESS NOTES
PT Evaluation     Today's date: 21  Patient name: Harry Fletcher  : 1970  MRN: [id number]  Referring provider: Diana Yuen MD  Dx:   Encounter Diagnosis     ICD-10-CM    1  Enthesopathy of right hip  M76 891    2  Gluteal tendonitis of right buttock  M76 01                   Assessment  Assessment details: Harry Fletcher is a 44 y o  male presenting to outpatient physical therapy with diagnosis of Enthesopathy of right hip  (primary encounter diagnosis)  Gluteal tendonitis of right buttock  Patient's current impairments include R flank pain, impaired soft tissue mobility, reduced range of motion, reduced R hip strength, and reduced activity tolerance  Patient's present functional limitations include difficulty with ADLs with increased need for assistance, reliance on medication and/or modalities for pain relief, poor tolerance for functional mobility and activity, and difficulty completing work/HH  responsibilities  Patient to benefit from skilled outpatient physical therapy 2x/week for 8 weeks in order to reduce pain, maximize pain free range of motion, increase strength and stability, and improve functional mobility/functional activity in order to maximize return to prior level of function with reduced limitations  Thank you for your referral     Impairments: abnormal or restricted ROM, activity intolerance, impaired physical strength, lacks appropriate home exercise program and pain with function    Symptom irritability: moderateBarriers to therapy: none  Understanding of Dx/Px/POC: good   Prognosis: good    Goals  STGs to be achieved in 4 weeks:  1  Pt to demonstrate reduced subjective pain rating "at worst" by at least 2-3 points from Initial Eval in order to allow for reduced pain with ADLs and improved functional activity tolerance  2  Pt to demonstrate increased AROM of R hip by at least 5-10 degrees in order to allow for greater ease and independence with ADLs and functional mobility     3  Pt to demonstrate full PROM of R hip  in order to maximize joint mobility and function and allow for progression of exercise program and achievement of goals  4  Pt to demonstrate increased MMT of R hip  by at least 1/2-1 grade in order to improve safety and stability with ADLs and functional mobility  LTGs to be achieved in 6-8 weeks:  1  Pt will be I with HEP in order to continue to improve quality of life and independence and reduce risk for re-injury  2  Pt to demonstrate return to painfree ambul  without limitations or restrictions  3  Pt to demonstrate improved function as noted by achieving or exceeding predicted score on FOTO outcomes assessment tool  Plan  Patient would benefit from: skilled physical therapy  Planned modality interventions: cryotherapy, thermotherapy: hydrocollator packs and ultrasound  Planned therapy interventions: manual therapy, neuromuscular re-education, patient education, stretching, strengthening, therapeutic exercise and home exercise program  Frequency: 2x week  Duration in weeks: 8  Plan of Care beginning date: 2021  Plan of Care expiration date: 2021  Treatment plan discussed with: patient        Subjective Evaluation    History of Present Illness  Mechanism of injury: Pt began with R flank pain  which occurs with walking or prolonged standing  Pain is localized from proximal hamstrings to proximal buttock R  Pt describes pain as "almost like a cramping"  Pt having diff with standing and walking  Diff walking dogs  The farther he walks, the worse it gets            Recurrent probem    Quality of life: fair    Pain  Current pain ratin  At best pain ratin  At worst pain ratin  Quality: cramping and burning  Relieving factors: rest, relaxation, heat and medications (tylenol or aleve)  Aggravating factors: standing, walking and stair climbing  Progression: worsening    Social Support  Steps to enter house: yes  Stairs in house: yes   Lives in: one-story house  Lives with: spouse    Employment status: working ( at [employer])  Hand dominance: right    Treatments  Previous treatment: injection treatment and physical therapy  Current treatment: physical therapy  Patient Goals  Patient goals for therapy: decreased pain, increased strength, independence with ADLs/IADLs and return to sport/leisure activities  Patient goal: resume painfree walking        Objective     Observations     Additional Observation Details  No obvious abnormalities  Amb indep w/o AD's w/nl gt pattern    Tenderness     Right Hip   Tenderness in the greater trochanter and iliac crest      Lumbar Screen  Lumbar range of motion within normal limits  Neurological Testing     Sensation     Hip   Left Hip   Intact: light touch    Right Hip   Intact: light touch    Active Range of Motion     Right Hip   Flexion: 103 degrees   External rotation (90/90): 40 degrees   Internal rotation (90/90): 27 degrees     Passive Range of Motion     Right Hip   Flexion: 113 degrees     Additional Passive Range of Motion Details  Hamstring length R 60*, ham length L 75*    Strength/Myotome Testing     Left Hip   Planes of Motion   Flexion: 5  Extension: 4+  Abduction: 4+    Right Hip   Planes of Motion   Flexion: 4    Additional Strength Details  Rknee ext 4+/5,  R knee flex 5/5    Tests     Right Hip   Positive KYREE and Christophe Cortés: Positive                Precautions: none      Re-eval Date: 6/21/21    Date 5/21/21       Visit Count 1       FOTO completed       Pain In        Pain Out              Manuals 5/21/21       Piriformis stretch  R 4 x 30"       R  Quad/hip flexor stretch 4 x 30"  2 ways       R Ham stretch 4 x 30"       R Groin stretch        R ITB stretch 4 x 30"       Squats on foam                        Neuro Re-Ed                                                                Ther Ex        Upright bike        4 way SLR        Bridge w/add        Clamshells w/TB        90/90 hip abd        Leg press        Leg ext mach        Leg flex mach        Step ups   Fwd/lat/rev                        Gait Training                        Modalities

## 2021-05-21 ENCOUNTER — EVALUATION (OUTPATIENT)
Dept: PHYSICAL THERAPY | Facility: CLINIC | Age: 51
End: 2021-05-21
Payer: COMMERCIAL

## 2021-05-21 DIAGNOSIS — M76.01 GLUTEAL TENDONITIS OF RIGHT BUTTOCK: ICD-10-CM

## 2021-05-21 DIAGNOSIS — M76.891 ENTHESOPATHY OF RIGHT HIP: Primary | ICD-10-CM

## 2021-05-21 PROCEDURE — 97162 PT EVAL MOD COMPLEX 30 MIN: CPT

## 2021-05-21 PROCEDURE — 97140 MANUAL THERAPY 1/> REGIONS: CPT

## 2021-05-21 NOTE — LETTER
May 24, 2021    Shanthi Farmer MD  [facility]    Patient: Jennifer Swann   YOB: 1970   Date of Visit: 2021     Encounter Diagnosis     ICD-10-CM    1  Enthesopathy of right hip  M76 891    2  Gluteal tendonitis of right buttock  M76 01        Dear Dr Amaya Do: Thank you for your recent referral of Jennifer Swann  Please review the attached evaluation summary from Tay's recent visit  Please verify that you agree with the plan of care by signing the attached order  If you have any questions or concerns, please do not hesitate to call  I sincerely appreciate the opportunity to share in the care of one of your patients and hope to have another opportunity to work with you in the near future  Sincerely,    Gloria Ames, PT      Referring Provider:      I certify that I have read the below Plan of Care and certify the need for these services furnished under this plan of treatment while under my care  Shanthi Farmer MD  [address] [address]  Via Fax: [phone]          PT Evaluation     Today's date: 21  Patient name: Jennifer Swann  : 1970  MRN: [id number]  Referring provider: Lawanda Brannon MD  Dx:   Encounter Diagnosis     ICD-10-CM    1  Enthesopathy of right hip  M76 891    2  Gluteal tendonitis of right buttock  M76 01                   Assessment  Assessment details: Jennifer Swann is a 44 y o  male presenting to outpatient physical therapy with diagnosis of Enthesopathy of right hip  (primary encounter diagnosis)  Gluteal tendonitis of right buttock  Patient's current impairments include R flank pain, impaired soft tissue mobility, reduced range of motion, reduced R hip strength, and reduced activity tolerance   Patient's present functional limitations include difficulty with ADLs with increased need for assistance, reliance on medication and/or modalities for pain relief, poor tolerance for functional mobility and activity, and difficulty completing work/HH  responsibilities  Patient to benefit from skilled outpatient physical therapy 2x/week for 8 weeks in order to reduce pain, maximize pain free range of motion, increase strength and stability, and improve functional mobility/functional activity in order to maximize return to prior level of function with reduced limitations  Thank you for your referral     Impairments: abnormal or restricted ROM, activity intolerance, impaired physical strength, lacks appropriate home exercise program and pain with function    Symptom irritability: moderateBarriers to therapy: none  Understanding of Dx/Px/POC: good   Prognosis: good    Goals  STGs to be achieved in 4 weeks:  1  Pt to demonstrate reduced subjective pain rating "at worst" by at least 2-3 points from Initial Eval in order to allow for reduced pain with ADLs and improved functional activity tolerance  2  Pt to demonstrate increased AROM of R hip by at least 5-10 degrees in order to allow for greater ease and independence with ADLs and functional mobility  3  Pt to demonstrate full PROM of R hip  in order to maximize joint mobility and function and allow for progression of exercise program and achievement of goals  4  Pt to demonstrate increased MMT of R hip  by at least 1/2-1 grade in order to improve safety and stability with ADLs and functional mobility  LTGs to be achieved in 6-8 weeks:  1  Pt will be I with HEP in order to continue to improve quality of life and independence and reduce risk for re-injury  2  Pt to demonstrate return to painfree ambul  without limitations or restrictions  3  Pt to demonstrate improved function as noted by achieving or exceeding predicted score on FOTO outcomes assessment tool         Plan  Patient would benefit from: skilled physical therapy  Planned modality interventions: cryotherapy, thermotherapy: hydrocollator packs and ultrasound  Planned therapy interventions: manual therapy, neuromuscular re-education, patient education, stretching, strengthening, therapeutic exercise and home exercise program  Frequency: 2x week  Duration in weeks: 8  Plan of Care beginning date: 2021  Plan of Care expiration date: 2021  Treatment plan discussed with: patient        Subjective Evaluation    History of Present Illness  Mechanism of injury: Pt began with R flank pain  which occurs with walking or prolonged standing  Pain is localized from proximal hamstrings to proximal buttock R  Pt describes pain as "almost like a cramping"  Pt having diff with standing and walking  Diff walking dogs  The farther he walks, the worse it gets  Recurrent probem    Quality of life: fair    Pain  Current pain ratin  At best pain ratin  At worst pain ratin  Quality: cramping and burning  Relieving factors: rest, relaxation, heat and medications (tylenol or aleve)  Aggravating factors: standing, walking and stair climbing  Progression: worsening    Social Support  Steps to enter house: yes  Stairs in house: yes   Lives in: [facility]  Lives with: spouse    Employment status: working ( at [employer])  Hand dominance: right    Treatments  Previous treatment: injection treatment and physical therapy  Current treatment: physical therapy  Patient Goals  Patient goals for therapy: decreased pain, increased strength, independence with ADLs/IADLs and return to sport/leisure activities  Patient goal: resume painfree walking        Objective     Observations     Additional Observation Details  No obvious abnormalities  Amb indep w/o AD's w/nl gt pattern    Tenderness     Right Hip   Tenderness in the greater trochanter and iliac crest      Lumbar Screen  Lumbar range of motion within normal limits      Neurological Testing     Sensation     Hip   Left Hip   Intact: light touch    Right Hip   Intact: light touch    Active Range of Motion     Right Hip   Flexion: 103 degrees   External rotation (90/90): 40 degrees   Internal rotation (90/90): 27 degrees     Passive Range of Motion     Right Hip   Flexion: 113 degrees     Additional Passive Range of Motion Details  Hamstring length R 60*, ham length L 75*    Strength/Myotome Testing     Left Hip   Planes of Motion   Flexion: 5  Extension: 4+  Abduction: 4+    Right Hip   Planes of Motion   Flexion: 4    Additional Strength Details  Rknee ext 4+/5,  R knee flex 5/5    Tests     Right Hip   Positive KYREE and Christophe Cortés: Positive                Precautions: none      Re-eval Date: 6/21/21    Date 5/21/21       Visit Count 1       FOTO completed       Pain In        Pain Out              Manuals 5/21/21       Piriformis stretch  R 4 x 30"       R  Quad/hip flexor stretch 4 x 30"  2 ways       R Ham stretch 4 x 30"       R Groin stretch        R ITB stretch 4 x 30"       Squats on foam                        Neuro Re-Ed                                                                Ther Ex        Upright bike        4 way SLR        Bridge w/add        Clamshells w/TB        90/90 hip abd        Leg press        Leg ext mach        Leg flex mach        Step ups   Fwd/lat/rev                        Gait Training                        Modalities

## 2021-05-24 ENCOUNTER — TRANSCRIBE ORDERS (OUTPATIENT)
Dept: PHYSICAL THERAPY | Facility: CLINIC | Age: 51
End: 2021-05-24

## 2021-05-24 DIAGNOSIS — M76.891 ENTHESOPATHY OF RIGHT HIP REGION: Primary | ICD-10-CM

## 2021-05-24 DIAGNOSIS — M76.01 GLUTEAL TENDINITIS OF RIGHT BUTTOCK: ICD-10-CM

## 2021-05-26 ENCOUNTER — OFFICE VISIT (OUTPATIENT)
Dept: PHYSICAL THERAPY | Facility: CLINIC | Age: 51
End: 2021-05-26
Payer: COMMERCIAL

## 2021-05-26 DIAGNOSIS — M76.01 GLUTEAL TENDONITIS OF RIGHT BUTTOCK: ICD-10-CM

## 2021-05-26 DIAGNOSIS — M76.891 ENTHESOPATHY OF RIGHT HIP: Primary | ICD-10-CM

## 2021-05-26 PROCEDURE — 97140 MANUAL THERAPY 1/> REGIONS: CPT

## 2021-05-26 PROCEDURE — 97110 THERAPEUTIC EXERCISES: CPT

## 2021-05-26 NOTE — PROGRESS NOTES
Daily Note     Today's date: 2021  Patient name: Darryle Gone  : 1970  MRN: [id number]  Referring provider: Alberto Argueta MD  Dx:   Encounter Diagnosis     ICD-10-CM    1  Enthesopathy of right hip  M76 891    2  Gluteal tendonitis of right buttock  M76 01                   Subjective:  Pt  Reports pain level = "1"/10 @ R glut max region  Objective: See treatment diary below      Assessment: Tolerated treatment Fair+ to Fairly well overall with performance of ther exer and tolerance to manual Therapy  Pt did have some discomfort with stand hip ext  and also sidely clamshell exercise  Says it feels like a strong resistance when performing these exercises  Pt  would benefit from continued skilled services to achieve initial eval goals set forth  Plan: Con't services 2x/week          Precautions: none      Re-eval Date: 21    Date 21      Visit Count 1 2      FOTO completed       Pain In  1/10      Pain Out  None verbalized            Manuals 21      Piriformis stretch  R 4 x 30" 4 x 30"      R  Quad/hip flexor stretch 4 x 30"  2 ways 4 x 30"      R Ham stretch 4 x 30" 4 x 30"        R Groin stretch  **R 4x/20"      R ITB stretch 4 x 30" 4 x 30"        Squats on foam                        Neuro Re-Ed                                                                Ther Ex  21      Upright bike  **Gear 2   10 min      4 way SLR  **Flex R 2x10  **Abd R 2x10      Bridge w/add  **Bridge 2x5/3"      Clamshells w/TB  **2x10 w/TB      90/90 hip abd        Leg press  **70# 30x        Leg ext mach  **22# 30x      Leg flex mach  **22# 30x      Step ups   Fwd/lat/rev        3 way SLR  **Stand   -Flex  -Abd 1x10  -ext 1x8              Gait Training                        Modalities

## 2021-05-28 ENCOUNTER — OFFICE VISIT (OUTPATIENT)
Dept: PHYSICAL THERAPY | Facility: CLINIC | Age: 51
End: 2021-05-28
Payer: COMMERCIAL

## 2021-05-28 DIAGNOSIS — M76.891 ENTHESOPATHY OF RIGHT HIP: Primary | ICD-10-CM

## 2021-05-28 DIAGNOSIS — M76.01 GLUTEAL TENDONITIS OF RIGHT BUTTOCK: ICD-10-CM

## 2021-05-28 PROCEDURE — 97140 MANUAL THERAPY 1/> REGIONS: CPT

## 2021-05-28 PROCEDURE — 97110 THERAPEUTIC EXERCISES: CPT

## 2021-05-28 NOTE — PROGRESS NOTES
Daily Note     Today's date: 2021  Patient name: Elena Pantoja  : 1970  MRN: [id number]  Referring provider: Gauri Waite MD  Dx:   Encounter Diagnosis     ICD-10-CM    1  Enthesopathy of right hip  M76 891    2  Gluteal tendonitis of right buttock  M76 01                   Subjective: Pt notes he feels about the same after his first visit  States      Objective: See treatment diary below      Assessment: Tolerated treatment well  Patient demonstrated fatigue post treatment, exhibited good technique with therapeutic exercises and would benefit from continued PT  Pt notes good stretch with all stretches, R hip tightness noted during ITB and pirif stretch  Diff with extension of R hip  Plan: Continue per plan of care        Precautions: none      Re-eval Date: 21    Date 21     Visit Count 1 2 3     FOTO completed       Pain In  1/10   1/10     Pain Out  None verbalized            Manuals 21     Piriformis stretch  R 4 x 30" 4 x 30"      R  Quad/hip flexor stretch 4 x 30"  2 ways 4 x 30" R  4 x 30"     R Ham stretch 4 x 30" 4 x 30"   4 x 30"     R Groin stretch  **R 4x/20" R 4 x 30"     R ITB stretch 4 x 30" 4 x 30"   4 x 30"     Squats on foam                        Neuro Re-Ed                                                                Ther Ex       Upright bike  **Gear 2   10 min Gear 2   10 min     4 way SLR  **Flex R 2x10  **Abd R 2x10 Flex 20  abd 20     Bridge w/add  **Bridge 2x5/3" Bridge w/GS   7x       Clamshells w/TB  **2x10 w/TB Pink  20x     90/90 hip abd        Leg press  **70# 30x   75#  30x     Leg ext mach  **22# 30x 22# 30x     Leg flex mach  **22# 30x 22# 30x     Step ups   Fwd/lat/rev        3 way SLR  **Stand   -Flex  -Abd 1x10  -ext 1x8 Flex 20  abd 13  Ext 7x  standing             Gait Training                        Modalities

## 2021-06-02 ENCOUNTER — OFFICE VISIT (OUTPATIENT)
Dept: PHYSICAL THERAPY | Facility: CLINIC | Age: 51
End: 2021-06-02
Payer: COMMERCIAL

## 2021-06-02 DIAGNOSIS — M76.891 ENTHESOPATHY OF RIGHT HIP: Primary | ICD-10-CM

## 2021-06-02 DIAGNOSIS — M76.01 GLUTEAL TENDONITIS OF RIGHT BUTTOCK: ICD-10-CM

## 2021-06-02 PROCEDURE — 97110 THERAPEUTIC EXERCISES: CPT

## 2021-06-02 PROCEDURE — 97140 MANUAL THERAPY 1/> REGIONS: CPT

## 2021-06-02 NOTE — PROGRESS NOTES
Daily Note     Today's date: 2021  Patient name: Laura Stovall  : 1970  MRN: [id number]  Referring provider: Ita Nation MD  Dx:   Encounter Diagnosis     ICD-10-CM    1  Enthesopathy of right hip  M76 891    2  Gluteal tendonitis of right buttock  M76 01                   Subjective:  " I over did it this weekend  " Slight increase in discomfort but tolerable  Objective: See treatment diary below      Assessment: Tolerated treatment fair  Patient demonstrated fatigue post treatment, exhibited good technique with therapeutic exercises and would benefit from continued PT  Limited tolerance to bridging- radicular symptoms from glute to mid hamstring  No improvement with cues for improved TA  Significant TTP along piriformis; trial of release today  Assess NV  Plan: Continue per plan of care  Precautions: none      Re-eval Date: 21    Date 21    Visit Count 1 2 3 4    FOTO completed       Pain In  1/10   1/10 3/10    Pain Out  None verbalized            Manuals 21    Piriformis stretch  R 4 x 30" 4 x 30"  30"x4  R    R  Quad/hip flexor stretch 4 x 30"  2 ways 4 x 30" R  4 x 30" 30"x4  R    R Ham stretch 4 x 30" 4 x 30"   4 x 30" 30"x4  R    R Groin stretch  **R 4x/20" R 4 x 30" 30"x4  R    R ITB stretch 4 x 30" 4 x 30"   4 x 30" 30"x4  R    Squats on foam                piriformis release    5 mins    Neuro Re-Ed                                                                Ther Ex      Upright bike  **Gear 2   10 min Gear 2   10 min Gear 2  10 min    4 way SLR  **Flex R 2x10  **Abd R 2x10 Flex 20  abd 20 Flex 2x10   abd 2x10    Bridge w/add  **Bridge 2x5/3" Bridge w/GS   [employer] with GS and TA  X 4 pain!     Clamshells w/TB  **2x10 w/TB Pink  20x Pink 2x10    90/90 hip abd        Leg press  **70# 30x   75#  30x NV    Leg ext mach  **22# 30x 22# 30x NV    Leg flex mach  **22# 30x 22# 30x NV    Step ups   Fwd/lat/rev 3 way SLR  **Stand   -Flex  -Abd 1x10  -ext 1x8 Flex 20  abd 13  Ext 7x  standing NV            Gait Training                        Modalities

## 2021-06-04 ENCOUNTER — OFFICE VISIT (OUTPATIENT)
Dept: PHYSICAL THERAPY | Facility: CLINIC | Age: 51
End: 2021-06-04
Payer: COMMERCIAL

## 2021-06-04 DIAGNOSIS — M76.01 GLUTEAL TENDONITIS OF RIGHT BUTTOCK: ICD-10-CM

## 2021-06-04 DIAGNOSIS — M76.891 ENTHESOPATHY OF RIGHT HIP: Primary | ICD-10-CM

## 2021-06-04 PROCEDURE — 97140 MANUAL THERAPY 1/> REGIONS: CPT

## 2021-06-04 PROCEDURE — 97110 THERAPEUTIC EXERCISES: CPT

## 2021-06-04 NOTE — PROGRESS NOTES
Daily Note     Today's date: 2021  Patient name: Ave Duverney  : 1970  MRN: [id number]  Referring provider: Kellen Pollard MD  Dx:   Encounter Diagnosis     ICD-10-CM    1  Enthesopathy of right hip  M76 891    2  Gluteal tendonitis of right buttock  M76 01                   Subjective: Pt notes he had a lot of pain last session with clamshells and bridges  Pain level this morning 2/10  Objective: See treatment diary below      Assessment: Tolerated treatment well  Patient exhibited good technique with therapeutic exercises and would benefit from continued PT  Pt had R groin cramp during pirif stretch    Held off on clamshells and bridges due to inducing pain last tx  Plan: Continue per plan of care        Precautions: none      Re-eval Date: 21    Date 21   Visit Count 1 2 3 4 5   FOTO completed       Pain In  1/10   1/10 3/10 2/10   Pain Out  None verbalized            Manuals 21   Piriformis stretch  R4 x 30" 4 x 30" 4 x 30"  30"x4  R 4 x 30"   R  Quad/hip flexor stretch 4 x 30"  2 ways 4 x 30" R  4 x 30" 30"x4  R 4 x 30"   R Ham stretch 4 x 30" 4 x 30"   4 x 30" 30"x4  R 4 x 30"   R Groin stretch  **R 4x/20" R 4 x 30" 30"x4  R    R ITB stretch 4 x 30" 4 x 30"   4 x 30" 30"x4  R    Squats on foam                piriformis release    5 mins    Neuro Re-Ed                                                                Ther Ex   6/   Upright bike  **Gear 2   10 min Gear 2   10 min Gear 2  10 min Nustep L4  15 min   4 way SLR  **Flex R 2x10  **Abd R 2x10 Flex 20  abd 20 Flex 2x10   abd 2x10    Bridge w/add  **Bridge 2x5/3" Bridge w/GS   [employer] with GS and TA  X 4 pain! hold   Clamshells w/TB  **2x10 w/TB Pink  20x Pink 2x10 hold   90/90 hip abd        Leg press  **70# 30x   75#  30x NV 80#  30x   Leg ext mach  **22# 30x 22# 30x NV 22#  30x   Leg flex mach  **22# 30x 22# 30x NV 22#  30x   Step ups   Fwd/lat/rev 3 way SLR  **Stand   -Flex  -Abd 1x10  -ext 1x8 Flex 20  abd 13  Ext 7x  standing NV 20 ea           Gait Training                        Modalities

## 2021-06-10 ENCOUNTER — TELEPHONE (OUTPATIENT)
Dept: SLEEP CENTER | Facility: CLINIC | Age: 51
End: 2021-06-10

## 2021-06-10 NOTE — TELEPHONE ENCOUNTER
----- Message from Missy Woodward MD sent at 6/10/2021  3:20 PM EDT -----  Approved  ----- Message -----  From: Emily Aden MA  Sent: 4/30/2021  10:08 AM EDT  To: Sleep Medicine [place] Provider    This sleep study needs approval      If approved please sign and return to clerical pool  If denied please include reasons why  Also provide alternative testing if warranted  Please sign and return to clerical pool

## 2021-06-11 ENCOUNTER — OFFICE VISIT (OUTPATIENT)
Dept: PHYSICAL THERAPY | Facility: CLINIC | Age: 51
End: 2021-06-11
Payer: COMMERCIAL

## 2021-06-11 DIAGNOSIS — M76.891 ENTHESOPATHY OF RIGHT HIP: Primary | ICD-10-CM

## 2021-06-11 DIAGNOSIS — M76.01 GLUTEAL TENDONITIS OF RIGHT BUTTOCK: ICD-10-CM

## 2021-06-11 PROCEDURE — 97110 THERAPEUTIC EXERCISES: CPT

## 2021-06-11 PROCEDURE — 97140 MANUAL THERAPY 1/> REGIONS: CPT

## 2021-06-11 NOTE — PROGRESS NOTES
Daily Note     Today's date: 2021  Patient name: Darryle Gone  : 1970  MRN: [id number]  Referring provider: Alberto Argueta MD  Dx:   Encounter Diagnosis     ICD-10-CM    1  Enthesopathy of right hip  M76 891    2  Gluteal tendonitis of right buttock  M76 01                   Subjective: Pt states he is having increased soreness after therapy sessions  States he cannot walk as far after PT before R hip soreness sets in     Objective: See treatment diary below      Assessment: Tolerated treatment well  Patient exhibited good technique with therapeutic exercises and would benefit from continued PT  Pt is unsure if PT is helping or not helping  Pt feels better immed following tx but then has increased symptoms when trying to walk  Plan: Continue per plan of care        Precautions: none      Re-eval Date: 21    Date 21       Visit Count 1       FOTO        Pain In 2/10       Pain Out              Manuals 21   6/ 6/4   Piriformis stretch  R4 x 30" 4 x 30"   30"x4  R 4 x 30"   R  Quad/hip flexor stretch 4 x 30"     30"x4  R 4 x 30"   R Ham stretch 4 x 30"   30"x4  R 4 x 30"   R Groin stretch    30"x4  R    R ITB stretch 4 x 30"   30"x4  R    Squats on foam         15 min       piriformis release    5 mins    Neuro Re-Ed                                                                Ther Ex 21   6 6/4   Upright bike NS  L4 10'   Gear 2  10 min Nustep L4  15 min   4 way SLR Flex 2 x 10"  abd 2 x 10'   Flex 2x10   abd 2x10    Bridge w/add 10 x 5" w/ball   Bridge with GS and TA  X 4 pain! hold   Clamshells w/TB No band  2 x 10   Pink 2x10 hold   90/90 hip abd        Leg press 85#  30x   NV 80#  30x   Leg ext mach 22#  30x   NV 22#  30x   Leg flex mach 22#  30x   NV 22#  30x   Step ups   Fwd/lat/rev        3 way SLR    NV 20 ea           Gait Training                        Modalities

## 2021-06-15 ENCOUNTER — OFFICE VISIT (OUTPATIENT)
Dept: PHYSICAL THERAPY | Facility: CLINIC | Age: 51
End: 2021-06-15
Payer: COMMERCIAL

## 2021-06-15 DIAGNOSIS — M76.891 ENTHESOPATHY OF RIGHT HIP: Primary | ICD-10-CM

## 2021-06-15 PROCEDURE — 97110 THERAPEUTIC EXERCISES: CPT

## 2021-06-15 PROCEDURE — 97140 MANUAL THERAPY 1/> REGIONS: CPT

## 2021-06-15 NOTE — PROGRESS NOTES
Daily Note     Today's date: 6/15/2021  Patient name: Elena Pantoja  : 1970  MRN: [id number]  Referring provider: Gauri Waite MD  Dx:   Encounter Diagnosis     ICD-10-CM    1  Enthesopathy of right hip  M76 891                   Subjective:  Pt  states "no better, no worse" re: status of R hip  Denies any pain at this present moment however  Objective: See treatment diary below      Assessment: Tolerated treatment Fair+ to Fairly Well overall with performance of ther exer  Tolerated manual Stretch Well  Pt  did note "burning" sensation @ R hip with performance of add  squeeze/bridge exercise  Plan:  Con't services 2x/week as per POC/Goals  Pt  Will follow up with Ortho MD on   Precautions: none      Re-eval Date: 21    Date 6/11/21 6 15 21      Visit Count 1 2      FOTO        Pain In 2/10 0/10      Pain Out  1-210  Sore from SLR and bridge exer              Manuals 6/11/21 6 15 21  6/2 6/   Piriformis stretch  R4 x 30" 4 x 30" 4 x 30"  30"x4  R 4 x 30"   R  Quad/hip flexor stretch 4 x 30"   4 x 30"  30"x4  R 4 x 30"   R Ham stretch 4 x 30" 4 x 30"  30"x4  R 4 x 30"   R Groin stretch  30"x4  R  30"x4  R    R ITB stretch 4 x 30" 4 x 30"  30"x4  R    Squats on foam         15 min 15 min      piriformis release    5 mins    Neuro Re-Ed                                                                Ther Ex 6/11/21 6 15 21  6/2 6/4   Upright bike NS  L4 10' NS  L4 10'  Gear 2  10 min Nustep L4  15 min   4 way SLR Flex 2 x 10"  abd 2 x 10' Flex 2 x 15  abd 2 x 15'  Flex 2x10   abd 2x10    Bridge w/add 10 x 5" w/ball 10 x 3" w/ball  Bridge with GS and TA  X 4 pain! hold   Clamshells w/TB No band  2 x 10 2x10  w/o band  Pink 2x10 hold   90/90 hip abd        Leg press 85#  30x 85#  30x    NV 80#  30x   Leg ext mach 22#  30x 22#  30x    NV 22#  30x   Leg flex mach 22#  30x 22#  30x  NV 22#  30x   Step ups   Fwd/lat/rev        3 way SLR    NV 20 ea           Gait Training Modalities

## 2021-06-18 ENCOUNTER — APPOINTMENT (OUTPATIENT)
Dept: PHYSICAL THERAPY | Facility: CLINIC | Age: 51
End: 2021-06-18
Payer: COMMERCIAL

## 2021-07-06 ENCOUNTER — EVALUATION (OUTPATIENT)
Dept: PHYSICAL THERAPY | Facility: CLINIC | Age: 51
End: 2021-07-06
Payer: COMMERCIAL

## 2021-07-06 DIAGNOSIS — G57.01 PIRIFORMIS SYNDROME OF RIGHT SIDE: Primary | ICD-10-CM

## 2021-07-06 PROCEDURE — 97110 THERAPEUTIC EXERCISES: CPT

## 2021-07-06 PROCEDURE — 97140 MANUAL THERAPY 1/> REGIONS: CPT

## 2021-07-06 NOTE — LETTER
2021    Bridgetet Ortiz MD  [facility]    Patient: Thierno Chua   YOB: 1970   Date of Visit: 2021     Encounter Diagnosis     ICD-10-CM    1  Piriformis syndrome of right side  G57 01        Dear Dr Ryder Newman: Thank you for your recent referral of Thierno Chua  Please review the attached evaluation summary from Tay's recent visit  Please verify that you agree with the plan of care by signing the attached order  If you have any questions or concerns, please do not hesitate to call  I sincerely appreciate the opportunity to share in the care of one of your patients and hope to have another opportunity to work with you in the near future  Sincerely,    Carol Hugo PT      Referring Provider:      I certify that I have read the below Plan of Care and certify the need for these services furnished under this plan of treatment while under my care  Bridegtte Ortiz MD  [address] [place] [address]  Via Fax: [phone]          PT Re-Evaluation     Today's date: 2021  Patient name: Thierno Chua  : 1970  MRN: [id number]  Referring provider: Carlos Hicks MD  Dx:   Encounter Diagnosis     ICD-10-CM    1  Piriformis syndrome of right side  G57 01                   Assessment  Assessment details: Ann Marie Cadet has had 7 OPPT visits for dx R gluteal tendonitis  He notes he feels he was going backwards with PT treatments and returned to Dr Ryder Newman who gave him a new dx of piriformis syndrome  Pt notes sometimes the pain feels deeper than piriformis pain  Pt is to try another 4 weeks of PT then RTD  If no improvemnet pt may have further testing ordered  Pt has made no gains in ROM or strength up until this point  Plan to cont PT tx with more focus on piriformis stretching and massage  Pt should benefit from cont'd PT 2x/week  x 6 weeks    Impairments: abnormal or restricted ROM, activity intolerance, impaired physical strength and pain with function    Symptom irritability: moderateUnderstanding of Dx/Px/POC: good   Prognosis: good    Goals  STGs to be achieved in 4 weeks:  1  Pt to demonstrate reduced subjective pain rating "at worst" by at least 2-3 points from Initial Eval in order to allow for reduced pain with ADLs and improved functional activity tolerance  NOT MET  2  Pt to demonstrate increased AROM of R hip by at least 5-10 degrees in order to allow for greater ease and independence with ADLs and functional mobility  NOT MET  3  Pt to demonstrate full PROM of R hip  in order to maximize joint mobility and function and allow for progression of exercise program and achievement of goals  NOT MET  4  Pt to demonstrate increased MMT of R hip  by at least 1/2-1 grade in order to improve safety and stability with ADLs and functional mobility  NOT MET    LTGs to be achieved in 6-8 weeks:  1  Pt will be I with HEP in order to continue to improve quality of life and independence and reduce risk for re-injury  MET  2  Pt to demonstrate return to painfree ambul  without limitations or restrictions  NOT MET  3  Pt to demonstrate improved function as noted by achieving or exceeding predicted score on FOTO outcomes assessment tool  Plan  Patient would benefit from: skilled physical therapy  Planned modality interventions: ultrasound  Planned therapy interventions: manual therapy, neuromuscular re-education, stretching, strengthening, therapeutic exercise and home exercise program  Frequency: 2x week  Duration in weeks: 6  Plan of Care beginning date: 7/6/2021  Plan of Care expiration date: 8/17/2021  Treatment plan discussed with: patient        Subjective Evaluation    History of Present Illness  Mechanism of injury: Pt came for course of therapy 5/21-6/15/21 for dx R enthesiopathy R hip  Pt stopped attending PT as he was returning to the physician  Pt returned today with dx of piriformis syndrome   Pt states he feels worse since attending PT  Pt states he is having trouble sleeping in his bed, must sleep on L side or on hard couch  Notes pain occurs quicker when walking the dogs than it used to  Cannot walk the distances he was previously walking   Pain has become more constant with less than 1/10 pain at rest           Recurrent probem    Quality of life: poor    Pain  Current pain rating: 3  At best pain ratin  At worst pain ratin  Quality: burning and cramping  Relieving factors: rest, change in position and medications (tylenol)  Aggravating factors: walking and standing  Progression: worsening    Social Support  Steps to enter house: yes (7)  Lives in: [facility] (with basement)    Employment status: working ()  Treatments  Previous treatment: physical therapy  Current treatment: physical therapy  Patient Goals  Patient goals for therapy: decreased pain, increased motion, increased strength and return to sport/leisure activities  Patient goal: work w/o pain        Objective     Lumbar Screen  Lumbar range of motion within normal limits with the following exceptions:Notes pain with end range R rotation    Neurological Testing     Sensation     Hip     Right Hip   Intact: light touch    Active Range of Motion     Right Hip   Flexion: 100 degrees   Abduction: 31 degrees   External rotation (90/90): 41 degrees   Internal rotation (90/90): 40 degrees     Strength/Myotome Testing     Left Hip   Normal muscle strength    Right Hip   Planes of Motion   Flexion: 4-  Extension: 4  Abduction: 4-    Additional Strength Details  R knee ext 4+/5  R knee flex 5/5             Precautions: none    Re-eval Date: 21    Date 21       Visit Count 8       FOTO completed       Pain In        Pain Out                Manuals 21       Ham stretch         Piriformis stretch Self stretch instr  4x 20"       R quad/hip flexor stretch        R groin stretch        protec roller        R ITB stretch 5 x 20"       Groin stretchITB stretch        Neuro Re-Ed                                                                Ther Ex        Upright bike        Foam roll  2 min       Leg press        Knee ext mach        Knee flex mach        Bridges w/abd        4 way SLR R        Fer w/TB        90/90                        Gait Training                        Modalities

## 2021-07-06 NOTE — PROGRESS NOTES
PT Re-Evaluation     Today's date: 2021  Patient name: Afua Delgado  : 1970  MRN: [id number]  Referring provider: Amee Downs MD  Dx:   Encounter Diagnosis     ICD-10-CM    1  Piriformis syndrome of right side  G57 01                   Assessment  Assessment details: Leia Gage has had 7 OPPT visits for dx R gluteal tendonitis  He notes he feels he was going backwards with PT treatments and returned to Dr Meryle Leigh who gave him a new dx of piriformis syndrome  Pt notes sometimes the pain feels deeper than piriformis pain  Pt is to try another 4 weeks of PT then RTD  If no improvemnet pt may have further testing ordered  Pt has made no gains in ROM or strength up until this point  Plan to cont PT tx with more focus on piriformis stretching and massage  Pt should benefit from cont'd PT 2x/week  x 6 weeks  Impairments: abnormal or restricted ROM, activity intolerance, impaired physical strength and pain with function    Symptom irritability: moderateUnderstanding of Dx/Px/POC: good   Prognosis: good    Goals  STGs to be achieved in 4 weeks:  1  Pt to demonstrate reduced subjective pain rating "at worst" by at least 2-3 points from Initial Eval in order to allow for reduced pain with ADLs and improved functional activity tolerance  NOT MET  2  Pt to demonstrate increased AROM of R hip by at least 5-10 degrees in order to allow for greater ease and independence with ADLs and functional mobility  NOT MET  3  Pt to demonstrate full PROM of R hip  in order to maximize joint mobility and function and allow for progression of exercise program and achievement of goals  NOT MET  4  Pt to demonstrate increased MMT of R hip  by at least 1/2-1 grade in order to improve safety and stability with ADLs and functional mobility  NOT MET    LTGs to be achieved in 6-8 weeks:  1  Pt will be I with HEP in order to continue to improve quality of life and independence and reduce risk for re-injury  MET  2   Pt to demonstrate return to painfree ambul  without limitations or restrictions  NOT MET  3  Pt to demonstrate improved function as noted by achieving or exceeding predicted score on FOTO outcomes assessment tool  Plan  Patient would benefit from: skilled physical therapy  Planned modality interventions: ultrasound  Planned therapy interventions: manual therapy, neuromuscular re-education, stretching, strengthening, therapeutic exercise and home exercise program  Frequency: 2x week  Duration in weeks: 6  Plan of Care beginning date: 2021  Plan of Care expiration date: 2021  Treatment plan discussed with: patient        Subjective Evaluation    History of Present Illness  Mechanism of injury: Pt came for course of therapy -6/15/21 for dx R enthesiopathy R hip  Pt stopped attending PT as he was returning to the physician  Pt returned today with dx of piriformis syndrome  Pt states he feels worse since attending PT  Pt states he is having trouble sleeping in his bed, must sleep on L side or on hard couch  Notes pain occurs quicker when walking the dogs than it used to  Cannot walk the distances he was previously walking   Pain has become more constant with less than 1/10 pain at rest           Recurrent probem    Quality of life: poor    Pain  Current pain rating: 3  At best pain ratin  At worst pain ratin  Quality: burning and cramping  Relieving factors: rest, change in position and medications (tylenol)  Aggravating factors: walking and standing  Progression: worsening    Social Support  Steps to enter house: yes (7)  Lives in: [facility] (with basement)    Employment status: working ()  Treatments  Previous treatment: physical therapy  Current treatment: physical therapy  Patient Goals  Patient goals for therapy: decreased pain, increased motion, increased strength and return to sport/leisure activities  Patient goal: work w/o pain        Objective     Lumbar Screen  Lumbar range of motion within normal limits with the following exceptions:Notes pain with end range R rotation    Neurological Testing     Sensation     Hip     Right Hip   Intact: light touch    Active Range of Motion     Right Hip   Flexion: 100 degrees   Abduction: 31 degrees   External rotation (90/90): 41 degrees   Internal rotation (90/90): 40 degrees     Strength/Myotome Testing     Left Hip   Normal muscle strength    Right Hip   Planes of Motion   Flexion: 4-  Extension: 4  Abduction: 4-    Additional Strength Details  R knee ext 4+/5  R knee flex 5/5             Precautions: none    Re-eval Date: 8/6/21    Date 7/6/21       Visit Count 8       FOTO completed       Pain In        Pain Out                Manuals 7/6/21       Ham stretch         Piriformis stretch Self stretch instr  4x 20"       R quad/hip flexor stretch        R groin stretch        protec roller        R ITB stretch 5 x 20"       Groin stretchITB stretch        Neuro Re-Ed                                                                Ther Ex        Upright bike        Foam roll  2 min       Leg press        Knee ext mach        Knee flex mach        Bridges w/abd        4 way SLR R        Fer w/TB        90/90                        Gait Training                        Modalities

## 2021-07-14 ENCOUNTER — APPOINTMENT (OUTPATIENT)
Dept: PHYSICAL THERAPY | Facility: CLINIC | Age: 51
End: 2021-07-14
Payer: COMMERCIAL

## 2021-07-15 ENCOUNTER — OFFICE VISIT (OUTPATIENT)
Dept: PHYSICAL THERAPY | Facility: CLINIC | Age: 51
End: 2021-07-15
Payer: COMMERCIAL

## 2021-07-15 DIAGNOSIS — G57.01 PIRIFORMIS SYNDROME OF RIGHT SIDE: Primary | ICD-10-CM

## 2021-07-15 PROCEDURE — 97110 THERAPEUTIC EXERCISES: CPT

## 2021-07-15 PROCEDURE — 97140 MANUAL THERAPY 1/> REGIONS: CPT

## 2021-07-15 NOTE — PROGRESS NOTES
Daily Note     Today's date: 7/15/2021  Patient name: Bayron Dunaway  : 1970  MRN: [id number]  Referring provider: David Elena MD  Dx:   Encounter Diagnosis     ICD-10-CM    1  Piriformis syndrome of right side  G57 01                   Subjective:  Pt  States he feels he is not improving with his sx @ R piriformis region  Pt  encouraged to give therapy a better chance, as this is only his 2nd visit  Objective: See treatment diary below      Assessment: Tolerated treatment Fairly Well overall with performance of ther exer and tolerance to Manual Stretch  Plan:  Con't services 2x/week  Precautions: none    Re-eval Date: 21    Date 21 15 21      Visit Count 8 9      FOTO completed       Pain In  10      Pain Out  3/10              Manuals 21 7 15 21      Ham stretch         Piriformis stretch Self stretch instr  4x 20" *manual R 4x/30"      R quad/hip flexor stretch  *manual R 4x/30"      R groin stretch        protec roller          R ITB stretch 5 x 20" *manual R 4x/30"      Groin stretchITB stretch  **NV      Neuro Re-Ed                                                                Ther Ex   15 21      Upright bike  **L 3   x 10 min      Foam roll  2 min       Leg press  Machine unavail          Knee ext mach  **22# 30x      Knee flex mach  **22# 30x      Bridges w/abd  **10x/5"      4 way SLR R  **R 20x ea        Clamshells w/TB  **30x w/o TB      90/90                        Gait Training                        Modalities

## 2021-07-19 ENCOUNTER — OFFICE VISIT (OUTPATIENT)
Dept: PHYSICAL THERAPY | Facility: CLINIC | Age: 51
End: 2021-07-19
Payer: COMMERCIAL

## 2021-07-19 DIAGNOSIS — G57.01 PIRIFORMIS SYNDROME OF RIGHT SIDE: Primary | ICD-10-CM

## 2021-07-19 PROCEDURE — 97110 THERAPEUTIC EXERCISES: CPT

## 2021-07-19 PROCEDURE — 97140 MANUAL THERAPY 1/> REGIONS: CPT

## 2021-07-19 NOTE — PROGRESS NOTES
Daily Note     Today's date: 2021  Patient name: Yon Aguilar  : 1970  MRN: [id number]  Referring provider: Hyacinth Norman MD  Dx:   Encounter Diagnosis     ICD-10-CM    1  Piriformis syndrome of right side  G57 01                   Subjective:  Pt reports sx @ R pirif area is "a little bit better"  Objective: See treatment diary below      Assessment: Tolerated treatment Well overall with performance of ther exer and tolerance to manual therapy  Plan: Con't services 2x/week  Precautions: none    Re-eval Date: 21    Date 21 7 15 21  21     Visit Count 8 9 10     FOTO completed       Pain In  5/10 2/10     Pain Out  3/10 1-2/10             Manuals 21 7 15 21 7 19 21     Ham stretch    **R 4x/30"     Piriformis stretch Self stretch instr  4x 20" *manual R 4x/30" manual R 4x/30"     R quad/hip flexor stretch  *manual R 4x/30" manual R 4x/30"     R groin stretch   **R 3x/15"     protec roller          R ITB stretch 5 x 20" *manual R 4x/30" manual R 4x/30"             Neuro Re-Ed                                                                Ther Ex  7 15 21 7 19 21     Upright bike  **L 3   x 10 min L3 10 min     Foam roll  2 min       Leg press  Machine unavail  Machine unavail       Knee ext mach  **22# 30x 22# 30x     Knee flex mach  **22# 30x 22# 30x     Bridges w/abd  **10x/5" 10x/5"     4 way SLR R  **R 20x ea   R 20x ea     Clamshells w/TB  **30x w/o TB 30x w/o TB     90/90                        Gait Training                        Modalities

## 2021-07-22 ENCOUNTER — APPOINTMENT (OUTPATIENT)
Dept: PHYSICAL THERAPY | Facility: CLINIC | Age: 51
End: 2021-07-22
Payer: COMMERCIAL

## 2021-07-27 ENCOUNTER — APPOINTMENT (OUTPATIENT)
Dept: PHYSICAL THERAPY | Facility: CLINIC | Age: 51
End: 2021-07-27
Payer: COMMERCIAL

## 2021-07-27 NOTE — PROGRESS NOTES
Daily Note     Today's date: 2021  Patient name: James Lowe  : 1970  MRN: [id number]  Referring provider: Saritha Irwin MD  Dx: No diagnosis found  Subjective: ***      Objective: See treatment diary below      Assessment: Tolerated treatment {Tolerated treatment :5761496209}  Patient {assessment:7114566094}      Plan: {PLAN:1113789222}     Precautions: none    Re-eval Date: 21    Date 21 7 15 21 7 19 21     Visit Count 8 9 10     FOTO completed       Pain In  5/10 2/10     Pain Out  3/10 1-2/10             Manuals 21 7 15 21 7 19 21     Ham stretch    **R 4x/30"     Piriformis stretch Self stretch instr  4x 20" *manual R 4x/30" manual R 4x/30"     R quad/hip flexor stretch  *manual R 4x/30" manual R 4x/30"     R groin stretch   **R 3x/15"     protec roller          R ITB stretch 5 x 20" *manual R 4x/30" manual R 4x/30"             Neuro Re-Ed                                                                Ther Ex  7 15 21 7 19 21     Upright bike  **L 3   x 10 min L3 10 min     Foam roll  2 min       Leg press  Machine unavail  Machine unavail       Knee ext mach  **22# 30x 22# 30x     Knee flex mach  **22# 30x 22# 30x     Bridges w/abd  **10x/5" 10x/5"     4 way SLR R  **R 20x ea   R 20x ea     Clamshells w/TB  **30x w/o TB 30x w/o TB     90/90                        Gait Training                        Modalities

## 2021-07-29 ENCOUNTER — OFFICE VISIT (OUTPATIENT)
Dept: SLEEP CENTER | Facility: CLINIC | Age: 51
End: 2021-07-29
Payer: COMMERCIAL

## 2021-07-29 ENCOUNTER — APPOINTMENT (OUTPATIENT)
Dept: PHYSICAL THERAPY | Facility: CLINIC | Age: 51
End: 2021-07-29
Payer: COMMERCIAL

## 2021-07-29 VITALS
TEMPERATURE: 97.5 F | HEART RATE: 58 BPM | WEIGHT: 180 LBS | OXYGEN SATURATION: 96 % | DIASTOLIC BLOOD PRESSURE: 60 MMHG | SYSTOLIC BLOOD PRESSURE: 110 MMHG | BODY MASS INDEX: 23.86 KG/M2 | HEIGHT: 73 IN

## 2021-07-29 DIAGNOSIS — K21.9 GASTROESOPHAGEAL REFLUX DISEASE, UNSPECIFIED WHETHER ESOPHAGITIS PRESENT: ICD-10-CM

## 2021-07-29 DIAGNOSIS — G47.19 EXCESSIVE DAYTIME SLEEPINESS: ICD-10-CM

## 2021-07-29 DIAGNOSIS — G47.52 REM SLEEP BEHAVIOR DISORDER: ICD-10-CM

## 2021-07-29 DIAGNOSIS — R53.83 FATIGUE, UNSPECIFIED TYPE: ICD-10-CM

## 2021-07-29 DIAGNOSIS — G47.26 SHIFT WORK SLEEP DISORDER: ICD-10-CM

## 2021-07-29 DIAGNOSIS — F51.12 INSUFFICIENT SLEEP SYNDROME: ICD-10-CM

## 2021-07-29 DIAGNOSIS — R09.81 NASAL CONGESTION: ICD-10-CM

## 2021-07-29 DIAGNOSIS — G47.33 OSA (OBSTRUCTIVE SLEEP APNEA): Primary | ICD-10-CM

## 2021-07-29 PROCEDURE — 3008F BODY MASS INDEX DOCD: CPT | Performed by: INTERNAL MEDICINE

## 2021-07-29 PROCEDURE — 1036F TOBACCO NON-USER: CPT | Performed by: INTERNAL MEDICINE

## 2021-07-29 PROCEDURE — 99204 OFFICE O/P NEW MOD 45 MIN: CPT | Performed by: INTERNAL MEDICINE

## 2021-07-29 NOTE — PROGRESS NOTES
Review of Systems      Genitourinary difficulty with erection   Cardiology none   Gastrointestinal none   Neurology frequent headaches, muscle weakness, forgetfulness, poor concentration or confusion,  and difficulty with memory   Constitutional fatigue   Integumentary none   Psychiatry aggressiveness or irritability   Musculoskeletal joint pain, muscle aches and back pain   Pulmonary frequent cough   ENT throat clearing   Endocrine none   Hematological none

## 2021-07-29 NOTE — LETTER
July 29, 2021     Patient: Erica Zhang   YOB: 1970   Date of Visit: 7/29/2021       To Whom it May Concern:    Juan Manuel Atwood is under my professional care  He was seen in my office on 7/29/2021  He may return to work on 7/29/2021  If you have any questions or concerns, please don't hesitate to call  Sincerely,          Wilbert Fernando MD        CC: Essence Ferguson

## 2021-07-29 NOTE — PATIENT INSTRUCTIONS
What is DOLLY? Obstructive sleep apnea is a common and serious sleep disorder that causes you to stop breathing during sleep  The airway repeatedly becomes blocked, limiting the amount of air that reaches your lungs  When this happens, you may snore loudly or making choking noises as you try to breathe  Your brain and body becomes oxygen deprived and you may wake up  This may happen a few times a night, or in more severe cases, several hundred times a night  Sleep apnea can make you wake up in the morning feeling tired or unrefreshed even though you have had a full night of sleep  During the day, you may feel fatigued, have difficulty concentrating or you may even unintentionally fall asleep  This is because your body is waking up numerous times throughout the night, even though you might not be conscious of each awakening  The lack of oxygen your body receives can have negative long-term consequences for your health  This includes:  High blood pressure  Heart disease  Irregular heart rhythms  Stroke  Pre-diabetes and diabetes  Depression    Testing  An objective evaluation of your sleep may be needed before your board certified sleep physician can make a diagnosis  Options include:   In-lab overnight sleep study  This type of sleep study requires you to stay overnight at a sleep center, in a bed that may resemble a hotel room  You will sleep with sensors hooked up to various parts of your body  These sensors record your brain waves, heartbeat, breathing and movement  An overnight sleep study also provides your doctor with the most complete information about your sleep  Learn more about an overnight sleep study      Home sleep apnea test  Some patients with high risk factors for obstructive sleep apnea and no other medical disorders may be candidates for a home sleep apnea test  The testing equipment differs in that it is less complicated than what is used in an overnight sleep study   As such, does not give all the data an in-lab will and if negative, may not mean you do not have the problem  Treatment for sleep apnea  includes using a continuous positive airway pressure (CPAP) machine to keep your airway open during sleep  A mask is placed over your nose and mouth, or just your nose  The mask is hooked to the CPAP machine that blows a gentle stream of air into the mask when you breathe  This helps keep your airway open so you can breathe more regularly  Extra oxygen may be given to you through the machine  You may be given a mouth device  It looks like a mouth guard or dental retainer and stops your tongue and mouth tissues from blocking your throat while you sleep  Surgery may be needed to remove extra tissues that block your mouth, throat, or nose  Manage sleep apnea:   Do not smoke  Nicotine and other chemicals in cigarettes and cigars can cause lung damage  Ask your healthcare provider for information if you currently smoke and need help to quit  E-cigarettes or smokeless tobacco still contain nicotine  Talk to your healthcare provider before you use these products  Do not drink alcohol or take sedative medicine before you go to sleep  Alcohol and sedatives can relax the muscles and tissues around your throat  This can block the airflow to your lungs  Maintain a healthy weight  Excess tissue around your throat may restrict your breathing  Ask your healthcare provider for information if you need to lose weight  Sleep on your side or use pillows designed to prevent sleep apnea  This prevents your tongue or other tissues from blocking your throat  You can also raise the head of your bed  Driving Safety  Refrain from driving when drowsy  Follow up with your healthcare provider as directed:  Write down your questions so you remember to ask them during your visits  Go to [employer] website for more information: Sleepeducation  org     What is DOLLY?    Obstructive sleep apnea is a common and serious sleep disorder that causes you to stop breathing during sleep  The airway repeatedly becomes blocked, limiting the amount of air that reaches your lungs  When this happens, you may snore loudly or making choking noises as you try to breathe  Your brain and body becomes oxygen deprived and you may wake up  This may happen a few times a night, or in more severe cases, several hundred times a night  Sleep apnea can make you wake up in the morning feeling tired or unrefreshed even though you have had a full night of sleep  During the day, you may feel fatigued, have difficulty concentrating or you may even unintentionally fall asleep  This is because your body is waking up numerous times throughout the night, even though you might not be conscious of each awakening  The lack of oxygen your body receives can have negative long-term consequences for your health  This includes:  High blood pressure  Heart disease  Irregular heart rhythms  Stroke  Pre-diabetes and diabetes  Depression    Testing  An objective evaluation of your sleep may be needed before your board certified sleep physician can make a diagnosis  Options include:   In-lab overnight sleep study  This type of sleep study requires you to stay overnight at a sleep center, in a bed that may resemble a hotel room  You will sleep with sensors hooked up to various parts of your body  These sensors record your brain waves, heartbeat, breathing and movement  An overnight sleep study also provides your doctor with the most complete information about your sleep  Learn more about an overnight sleep study      Home sleep apnea test  Some patients with high risk factors for obstructive sleep apnea and no other medical disorders may be candidates for a home sleep apnea test  The testing equipment differs in that it is less complicated than what is used in an overnight sleep study   As such, does not give all the data an in-lab will and if negative, may not mean you do not have the problem  Treatment for sleep apnea  includes using a continuous positive airway pressure (CPAP) machine to keep your airway open during sleep  A mask is placed over your nose and mouth, or just your nose  The mask is hooked to the CPAP machine that blows a gentle stream of air into the mask when you breathe  This helps keep your airway open so you can breathe more regularly  Extra oxygen may be given to you through the machine  You may be given a mouth device  It looks like a mouth guard or dental retainer and stops your tongue and mouth tissues from blocking your throat while you sleep  Surgery may be needed to remove extra tissues that block your mouth, throat, or nose  Manage sleep apnea:   Do not smoke  Nicotine and other chemicals in cigarettes and cigars can cause lung damage  Ask your healthcare provider for information if you currently smoke and need help to quit  E-cigarettes or smokeless tobacco still contain nicotine  Talk to your healthcare provider before you use these products  Do not drink alcohol or take sedative medicine before you go to sleep  Alcohol and sedatives can relax the muscles and tissues around your throat  This can block the airflow to your lungs  Maintain a healthy weight  Excess tissue around your throat may restrict your breathing  Ask your healthcare provider for information if you need to lose weight  Sleep on your side or use pillows designed to prevent sleep apnea  This prevents your tongue or other tissues from blocking your throat  You can also raise the head of your bed  Driving Safety  Refrain from driving when drowsy  Follow up with your healthcare provider as directed:  Write down your questions so you remember to ask them during your visits  Go to AAS website for more information: Sleepeducation  org           Nursing Support:  When: Monday through Friday 7A-5PM except holidays  Where: Our direct line is [phone]      If you are having a true emergency please call 911  In the event that the line is busy or it is after hours please leave a voice message and we will return your call  Please speak clearly, leaving your full name, birth date, best number to reach you and the reason for your call  Medication refills: We will need the name of the medication, the dosage, the ordering provider, whether you get a 30 or 90 day refill, and the pharmacy name and address  Medications will be ordered by the provider only  Nurses cannot call in prescriptions  Please allow 7 days for medication refills  Physician requested updates: If your provider requested that you call with an update after starting medication, please be ready to provide us the medication and dosage, what time you take your medication, the time you attempt to fall asleep, time you fall asleep, when you wake up, and what time you get out of bed  Sleep Study Results: We will contact you with sleep study results and/or next steps after the physician has reviewed your testing

## 2021-07-29 NOTE — PROGRESS NOTES
Consultation - [address] [place] [address]  46 y o  male  :1970  OPR:[id number]  DOS:2021    Physician Requesting Consult: Gregory Cardozo DO             Reason for Consult : At your kind request I saw Angelica Villagomez for initial sleep evaluation today  The patient is here to evaluate for suspected Obstructive Sleep Apnea  PCP requested a sleep study that was denied by his insurance  PFSH, Problem List, Medications & Allergies were reviewed in EMR  Oneita Severs  has no past medical history on file  He has a current medication list which includes the following prescription(s): sildenafil, tadalafil, and pantoprazole  HPI:  He presents with a complaint of tired almost all the time " of around 2 years duration " He is also drowsing and he is nodding off unintentionally" when sedentary  Bed partner does not report snoring or breathing difficulties during sleep  Other Complaints:  Chronic back pain that radiates to the right buttock any gets relief from laying down Restless Leg Syndrome: reports no suggestive symptoms    Parasomnia:  He reports acting out dream content that has been occurring around 2 times a month over the past 2-3 years  Recently he had an episode where he kicked the door of his bedside better still in association with a dream he was kicking an assailant  Fortunately, to date there has been no injury to himself or his bed partner  Sleep Routine (on average): He works 2nd shift Typical Bedtime:  1:00 a m  Gets OOB:  7:00 a m  TIB:6 hrs  Sleep latency:<  30 minutes Sleep Interruptions:2-3/nite through disturbance from pets or is not always sure of cause and may struggle to fall back asleep  Awakens: by disturbance from pets   Upon awakening: never feels rested  He estimates getting 4-6 hrs sleep  Oneita Severs reports Excessive Daytime Sleepiness is dozing off unintentionally in various settings   He rated himself at Total score: 15 /24 on the Thib[place] Sleepiness Scale  Habits:  reports that he has quit smoking  He has never used smokeless tobacco  ;   E-Cigarette/Vaping    E-Cigarette Use Never User     ;  reports no history of drug use ;  reports current alcohol use  ; Caffeine use:limited ; Exercise routine: none   Family History: Negative for sleep disturbance  ROS - see attached  Significant for weight has been stable  He has nasal congestion and postnasal drip  He reported no respiratory or cardiac symptoms  He reports irritability and difficulty with memory  Georgette Ormond EXAM:  /60 (BP Location: Left arm, Cuff Size: Large)   Pulse 58   Temp 97 5 °F (36 4 °C) (Temporal)   Ht 6' 1" (1 854 m)   Wt 81 6 kg (180 lb)   SpO2 96%   BMI 23 75 kg/m²    General: Well groomed male, well appearing, in no apparent distress  Neurological: Alert and orientated;  cooperative; Cranial nerves intact;    Psychiatric: Speech:clear and coherent;  Normal mood, affect & thought   Skin: warm and dry; Color& Hydration good; no facial rashes or lesions   HEENT:  Craniofacial anatomy: narrow facies Sinuses: non- tender  TMJ: Normal    Eyes: EOM's intact;  conjunctiva/corneas clear   Ears: Externallyappear normal     Nasal Airway: is patent Septum:intact; Mucosa: normal; Turbinates: normal; Rhinorrhea:  Postnasal drip   Mouth: Lips: normal posture; Dentition: normal   Mucosa:moist  ; Hard Palate:normal    Oropharryx: crowded and AP narrowing Tongue: Mallampati:Class II and MobileSoft Palate:  redundant  Tonsils: absent  Neck:; Neck Circumference: 18 "; Supple; no abnormal masses; Thyroid:normal  Trachea:central     Lymph: No Cervical or Submandibular Lymhadenopathy  Heart: S1,S2 normal; RRR; no gallop; no murmurs   Lungs: Respiratory Effort:normal  Air entry good bilaterally  No wheezes  No rales  Abdomen: Obese, Soft & non-tender    Extremities: No pedal edema  No clubbing or cyanosis      Musculoskeletal:  Motor normal; Gait:normal        IMPRESSION: Primary/Secondary Sleep Diagnoses (to Medical or Psych conditions) & Comorbidities   1  DOLLY (obstructive sleep apnea)  Diagnostic Sleep Study   2  REM sleep behavior disorder  Diagnostic Sleep Study   3  Excessive daytime sleepiness  Diagnostic Sleep Study   4  Insufficient sleep syndrome     5  Shift work sleep disorder     6  Nasal congestion     7  Fatigue, unspecified type  Ambulatory referral to Sleep Medicine   8  Gastroesophageal reflux disease, unspecified whether esophagitis present          PLAN:   With respect to above conditions, comprehensive counseling provided on pathophysiology; effects on symptoms and comorbidities, diagnostic strategies & limitations; treatment options; risks or no treament; risks & benefits of the various therapeutic options; costs and insurance aspects  Specifically association between REM behavior disorder and Parkinson's/autonomic neuropathy  I advised weight reduction, avoiding sleeping supine, using alcohol or sedating medications close to bed time and on safe driving practices  Nocturnal polysomnography is indicated and a diagnostic study will be scheduled  Patient is willing to try Positive airway pressure therapy and will be scheduled for a titration study if indicated  Advised on sleep hygiene specifically allowing sufficient opportunity for sleep and on safety precaution with respect to parasomnia activity  Follow-up to be scheduled after the studies to review results, further details of treatment options and to initiate/adjust therapy      Sincerely,     Authenticated electronically by Starr Rivera MD   on 82/93/69   Board Certified Specialist

## 2021-08-02 ENCOUNTER — TELEPHONE (OUTPATIENT)
Dept: SLEEP CENTER | Facility: CLINIC | Age: 51
End: 2021-08-02

## 2021-08-02 NOTE — TELEPHONE ENCOUNTER
Patients insurance denied the in lab sleep study ordered by you because the authorization that was done back in may with Dr Cristal Holstein was denied and the patient needs to wait 6 months to start another pre-authorization   Th insurance said the only way that the patient can do an in lab is if the original ordering doctor, meaning Dr Cristal Holstein, needs to do a peer to peer otherwise patient needs a home sleep study or wait 6 months from may to try an obtain the diagnostic sleep study authorization

## 2021-08-03 NOTE — TELEPHONE ENCOUNTER
Patient insurance messed up, the [phone] was approved   Per our pre-encounter rep, he can proceed with the diagnostic sleep study scheduled for 08/10/21

## 2021-08-10 ENCOUNTER — HOSPITAL ENCOUNTER (OUTPATIENT)
Dept: SLEEP CENTER | Facility: CLINIC | Age: 51
Discharge: HOME | End: 2021-08-10
Payer: COMMERCIAL

## 2021-08-10 DIAGNOSIS — G47.33 OBSTRUCTIVE SLEEP APNEA (ADULT) (PEDIATRIC): ICD-10-CM

## 2021-08-10 DIAGNOSIS — G47.52 REM SLEEP BEHAVIOR DISORDER: ICD-10-CM

## 2021-08-10 DIAGNOSIS — G47.19 EXCESSIVE DAYTIME SLEEPINESS: ICD-10-CM

## 2021-08-10 PROCEDURE — 95810 POLYSOM 6/> YRS 4/> PARAM: CPT

## 2021-08-11 NOTE — PROGRESS NOTES
Sleep Study Documentation    Pre-Sleep Study       Sleep testing procedure explained to patient:YES    Patient napped prior to study:NO    [address] [place] worker after 12PM   Caffeine use:NO    Alcohol:Dayshift workers after 5PM: Alcohol use:NO    Typical day for patient:YES       Study Documentation    Sleep Study Indications:  EDS, unrefreshing sleep, parasomnia  Sleep Study: Diagnostic   Snore:Mild  Supplemental O2: no    O2 flow rate (L/min) range 0  O2 flow rate (L/min) final 0  Minimum SaO2  87 %  Baseline SaO2  95 3 %            EKG abnormalities: yes:  PVCs, EPOCHS:  272, 273  PACs, EPOCHS:  453, 456  EEG abnormalities: no    Sleep Study Recorded < 2 hours: N/A    Sleep Study Recorded > 2 hours but incomplete study: N/A    Sleep Study Recorded 6 hours but no sleep obtained: NO          Post-Sleep Study    Medication used at bedtime or during sleep study:NO    Patient reports time it took to fall asleep:30 to 60 minutes    Patient reports waking up during study:3 or more times  Patient reports returning to sleep without difficulty  Patient reports sleeping 6 to 8 hours with dreaming  Patient reports sleep during study:typical    Patient rated sleepiness: Somewhat sleepy or tired    PAP treatment:no

## 2021-08-13 NOTE — PROGRESS NOTES
PT Discharge    Today's date: 21  Patient name: Juan A De Paz  : 1970  MRN: [id number]  Referring provider: Phoenix Lui MD  Dx:   Encounter Diagnosis     ICD-10-CM    1  Enthesopathy of right hip  M76 [id number]        Start Time:   Stop Time: 850  Total time in clinic (min): 55 minutes    Assessment  Assessment details: Trae Arana will be discharged from outpatient physical therapy care due to expiration of plan of care  No objective measures updated, Trae Arana is not present at time of discharge  Impairments: abnormal or restricted ROM, activity intolerance, impaired physical strength, lacks appropriate home exercise program and pain with function    Symptom irritability: moderateBarriers to therapy: none  Understanding of Dx/Px/POC: good   Prognosis: good    Goals  STGs to be achieved in 4 weeks:  1  Pt to demonstrate reduced subjective pain rating "at worst" by at least 2-3 points from Initial Eval in order to allow for reduced pain with ADLs and improved functional activity tolerance  2  Pt to demonstrate increased AROM of R hip by at least 5-10 degrees in order to allow for greater ease and independence with ADLs and functional mobility  3  Pt to demonstrate full PROM of R hip  in order to maximize joint mobility and function and allow for progression of exercise program and achievement of goals  4  Pt to demonstrate increased MMT of R hip  by at least 1/2-1 grade in order to improve safety and stability with ADLs and functional mobility  LTGs to be achieved in 6-8 weeks:  1  Pt will be I with HEP in order to continue to improve quality of life and independence and reduce risk for re-injury  2  Pt to demonstrate return to painfree ambul  without limitations or restrictions  3  Pt to demonstrate improved function as noted by achieving or exceeding predicted score on FOTO outcomes assessment tool  Pt goals not formally addressed due to self Dc from PT   Please see last progress note for status  Subjective Evaluation    History of Present Illness  Mechanism of injury: Pt began with R flank pain  which occurs with walking or prolonged standing  Pain is localized from proximal hamstrings to proximal buttock R  Pt describes pain as "almost like a cramping"  Pt having diff with standing and walking  Diff walking dogs  The farther he walks, the worse it gets  Recurrent probem    Quality of life: fair    Pain  Current pain ratin  At best pain ratin  At worst pain ratin  Quality: cramping and burning  Relieving factors: rest, relaxation, heat and medications (tylenol or aleve)  Aggravating factors: standing, walking and stair climbing  Progression: worsening    Social Support  Steps to enter house: yes  Stairs in house: yes   Lives in: [place] house  Lives with: spouse    Employment status: working ( at [employer])  Hand dominance: right    Treatments  Previous treatment: injection treatment and physical therapy  Current treatment: physical therapy  Patient Goals  Patient goals for therapy: decreased pain, increased strength, independence with ADLs/IADLs and return to sport/leisure activities  Patient goal: resume painfree walking        Objective     Observations     Additional Observation Details  No obvious abnormalities  Amb indep w/o AD's w/nl gt pattern    Tenderness     Right Hip   Tenderness in the greater trochanter and iliac crest      Lumbar Screen  Lumbar range of motion within normal limits      Neurological Testing     Sensation     Hip   Left Hip   Intact: light touch    Right Hip   Intact: light touch    Active Range of Motion     Right Hip   Flexion: 103 degrees   External rotation (90/90): 40 degrees   Internal rotation (90/90): 27 degrees     Passive Range of Motion     Right Hip   Flexion: 113 degrees     Additional Passive Range of Motion Details  Hamstring length R 60*, ham length L 75*    Strength/Myotome Testing     Left Hip   Planes of Motion   Flexion: 5  Extension: 4+  Abduction: 4+    Right Hip   Planes of Motion   Flexion: 4    Additional Strength Details  Rknee ext 4+/5,  R knee flex 5/5    Tests     Right Hip   Positive KYREE and Christophe Cortés: Positive                Precautions: none

## 2021-08-16 NOTE — PROGRESS NOTES
PT Discharge    Today's date: 8/15/2021  Patient name: Bere Martinez  : 1970  MRN: [id number]  Referring provider: Clark Peña MD  Dx:   Encounter Diagnosis     ICD-10-CM    1  Piriformis syndrome of right side  G57 01                   Assessment  Assessment details: Montrell Wei will be discharged from outpatient physical therapy care due to expiration of plan of care  No objective measures updated, Montrell Wei is not present at time of discharge  Impairments: abnormal or restricted ROM, activity intolerance, impaired physical strength and pain with function    Symptom irritability: moderateUnderstanding of Dx/Px/POC: good   Prognosis: good    Goals  STGs to be achieved in 4 weeks:  1  Pt to demonstrate reduced subjective pain rating "at worst" by at least 2-3 points from Initial Eval in order to allow for reduced pain with ADLs and improved functional activity tolerance  NOT MET  2  Pt to demonstrate increased AROM of R hip by at least 5-10 degrees in order to allow for greater ease and independence with ADLs and functional mobility  NOT MET  3  Pt to demonstrate full PROM of R hip  in order to maximize joint mobility and function and allow for progression of exercise program and achievement of goals  NOT MET  4  Pt to demonstrate increased MMT of R hip  by at least 1/2-1 grade in order to improve safety and stability with ADLs and functional mobility  NOT MET    LTGs to be achieved in 6-8 weeks:  1  Pt will be I with HEP in order to continue to improve quality of life and independence and reduce risk for re-injury  MET  2  Pt to demonstrate return to painfree ambul  without limitations or restrictions  NOT MET  3  Pt to demonstrate improved function as noted by achieving or exceeding predicted score on FOTO outcomes assessment tool  Pt goals not formally addressed due to self Dc from PT  Please see last progress note for status            Subjective Evaluation    History of Present Illness  Mechanism of injury: Pt came for course of therapy -6/15/21 for dx R enthesiopathy R hip  Pt stopped attending PT as he was returning to the physician  Pt returned today with dx of piriformis syndrome  Pt states he feels worse since attending PT  Pt states he is having trouble sleeping in his bed, must sleep on L side or on hard couch  Notes pain occurs quicker when walking the dogs than it used to  Cannot walk the distances he was previously walking   Pain has become more constant with less than 1/10 pain at rest           Recurrent probem    Quality of life: poor    Pain  Current pain rating: 3  At best pain ratin  At worst pain ratin  Quality: burning and cramping  Relieving factors: rest, change in position and medications (tylenol)  Aggravating factors: walking and standing  Progression: worsening    Social Support  Steps to enter house: yes (7)  Lives in: [facility] (with basement)    Employment status: working ()  Treatments  Previous treatment: physical therapy  Current treatment: physical therapy  Patient Goals  Patient goals for therapy: decreased pain, increased motion, increased strength and return to sport/leisure activities  Patient goal: work w/o pain        Objective     Lumbar Screen  Lumbar range of motion within normal limits with the following exceptions:Notes pain with end range R rotation    Neurological Testing     Sensation     Hip     Right Hip   Intact: light touch    Active Range of Motion     Right Hip   Flexion: 100 degrees   Abduction: 31 degrees   External rotation (90/90): 41 degrees   Internal rotation (90/90): 40 degrees     Strength/Myotome Testing     Left Hip   Normal muscle strength    Right Hip   Planes of Motion   Flexion: 4-  Extension: 4  Abduction: 4-    Additional Strength Details  R knee ext 4+/5  R knee flex 5/5             Precautions: none

## 2021-10-06 ENCOUNTER — TELEMEDICINE (OUTPATIENT)
Dept: FAMILY MEDICINE CLINIC | Facility: CLINIC | Age: 51
End: 2021-10-06
Payer: COMMERCIAL

## 2021-10-06 DIAGNOSIS — Z20.822 EXPOSURE TO COVID-19 VIRUS: Primary | ICD-10-CM

## 2021-10-06 PROCEDURE — U0003 INFECTIOUS AGENT DETECTION BY NUCLEIC ACID (DNA OR RNA); SEVERE ACUTE RESPIRATORY SYNDROME CORONAVIRUS 2 (SARS-COV-2) (CORONAVIRUS DISEASE [COVID-19]), AMPLIFIED PROBE TECHNIQUE, MAKING USE OF HIGH THROUGHPUT TECHNOLOGIES AS DESCRIBED BY CMS-2020-01-R: HCPCS | Performed by: FAMILY MEDICINE

## 2021-10-06 PROCEDURE — U0005 INFEC AGEN DETEC AMPLI PROBE: HCPCS | Performed by: FAMILY MEDICINE

## 2021-10-06 PROCEDURE — 99212 OFFICE O/P EST SF 10 MIN: CPT | Performed by: FAMILY MEDICINE

## 2021-10-08 ENCOUNTER — TELEPHONE (OUTPATIENT)
Dept: FAMILY MEDICINE CLINIC | Facility: CLINIC | Age: 51
End: 2021-10-08

## 2022-02-15 ENCOUNTER — OFFICE VISIT (OUTPATIENT)
Dept: UROLOGY | Facility: CLINIC | Age: 52
End: 2022-02-15
Payer: COMMERCIAL

## 2022-02-15 VITALS
BODY MASS INDEX: 24.65 KG/M2 | SYSTOLIC BLOOD PRESSURE: 122 MMHG | HEIGHT: 73 IN | WEIGHT: 186 LBS | HEART RATE: 56 BPM | DIASTOLIC BLOOD PRESSURE: 74 MMHG

## 2022-02-15 DIAGNOSIS — R79.89 LOW TESTOSTERONE: ICD-10-CM

## 2022-02-15 DIAGNOSIS — F52.21 ERECTILE DYSFUNCTION OF NON-ORGANIC ORIGIN: ICD-10-CM

## 2022-02-15 DIAGNOSIS — Z53.20 NEW PATIENT SCREENING DECLINED: ICD-10-CM

## 2022-02-15 DIAGNOSIS — R35.0 URINARY FREQUENCY: ICD-10-CM

## 2022-02-15 DIAGNOSIS — Z12.5 PROSTATE CANCER SCREENING: Primary | ICD-10-CM

## 2022-02-15 LAB
BACTERIA UR QL AUTO: NORMAL /HPF
BILIRUB UR QL STRIP: NEGATIVE
CLARITY UR: CLEAR
COLOR UR: YELLOW
GLUCOSE UR STRIP-MCNC: NEGATIVE MG/DL
HGB UR QL STRIP.AUTO: NEGATIVE
HYALINE CASTS #/AREA URNS LPF: NORMAL /LPF
KETONES UR STRIP-MCNC: NEGATIVE MG/DL
LEUKOCYTE ESTERASE UR QL STRIP: NEGATIVE
NITRITE UR QL STRIP: NEGATIVE
NON-SQ EPI CELLS URNS QL MICRO: NORMAL /HPF
PH UR STRIP.AUTO: 6.5 [PH]
PROT UR STRIP-MCNC: NEGATIVE MG/DL
RBC #/AREA URNS AUTO: NORMAL /HPF
SL AMB  POCT GLUCOSE, UA: NORMAL
SL AMB LEUKOCYTE ESTERASE,UA: NORMAL
SL AMB POCT BILIRUBIN,UA: NORMAL
SL AMB POCT BLOOD,UA: NORMAL
SL AMB POCT CLARITY,UA: CLEAR
SL AMB POCT COLOR,UA: YELLOW
SL AMB POCT KETONES,UA: NORMAL
SL AMB POCT NITRITE,UA: NORMAL
SL AMB POCT PH,UA: 6.5
SL AMB POCT SPECIFIC GRAVITY,UA: 1
SL AMB POCT URINE PROTEIN: NORMAL
SL AMB POCT UROBILINOGEN: 0.2
SP GR UR STRIP.AUTO: 1.01 (ref 1–1.03)
UROBILINOGEN UR QL STRIP.AUTO: 0.2 E.U./DL
WBC #/AREA URNS AUTO: NORMAL /HPF

## 2022-02-15 PROCEDURE — 81001 URINALYSIS AUTO W/SCOPE: CPT | Performed by: NURSE PRACTITIONER

## 2022-02-15 PROCEDURE — 1036F TOBACCO NON-USER: CPT | Performed by: NURSE PRACTITIONER

## 2022-02-15 PROCEDURE — 81002 URINALYSIS NONAUTO W/O SCOPE: CPT | Performed by: NURSE PRACTITIONER

## 2022-02-15 PROCEDURE — 99202 OFFICE O/P NEW SF 15 MIN: CPT | Performed by: NURSE PRACTITIONER

## 2022-02-15 NOTE — PROGRESS NOTES
Assessment and plan:     Prostate cancer screening  · PSA in March 2022  · Positive family hx of prostate cancer in uncle  · Follow up 1 year    Urinary frequency  · Increase water intake  · Avoid bladder irritants, mainly coffee  · Follow up 1 year, sooner if symptoms bothersome    Erectile dysfunction of non-organic origin  · Takes cialis intermiently    Low testosterone  · Testosterone 413 (March 2021)          COLE Arnold    History of Present Illness     Jack Lucero is a 46 y o  new patient who presents for prostate cancer screening  Reports positive family history of prostate cancer in an uncle  Reports prior rectal exams that were normal   His last PSA March 2021 was 1 0  He reports some urinary frequency and nocturia  He drinks "a lot of coffee", he reports a quart or more a day  He also drinks milk, about a quart a day  He does not drink water  We discussed avoidance of bladder irritants and increasing his water intake  He has a hx of erectile dysfunction  He was taking prn cialis and noted some increase in heart burn  He felt the cialis was effective         Laboratory     Lab Results   Component Value Date    BUN 16 03/30/2021    CREATININE 1 17 03/30/2021       No components found for: GFR    Lab Results   Component Value Date    GLUCOSE 93 10/31/2014    CALCIUM 9 1 03/30/2021     10/31/2014    K 4 5 03/30/2021    CO2 29 03/30/2021     03/30/2021       Lab Results   Component Value Date    WBC 5 40 03/30/2021    HGB 14 2 03/30/2021    HCT 42 2 03/30/2021    MCV 90 03/30/2021     03/30/2021       Lab Results   Component Value Date    PSA 1 0 03/30/2021    PSA 1 1 09/11/2019    PSA 2 3 07/05/2018       Recent Results (from the past 1 hour(s))   POCT urine dip    Collection Time: 02/15/22 10:09 AM   Result Value Ref Range    LEUKOCYTE ESTERASE,UA -     NITRITE,UA -     SL AMB POCT UROBILINOGEN 0 2     POCT URINE PROTEIN -      PH,UA 6 5     BLOOD,UA -     SPECIFIC GRAVITY,UA 1 000     [address], UA -      COLOR,UA yellow     CLARITY,UA clear        @RESULT(URINEMICROSCOPIC)@    @RESULT(URINECULTURE)@    Radiology       Review of Systems     Review of Systems   Constitutional: Negative for activity change, appetite change, chills, fatigue, fever and unexpected weight change  HENT: Negative for facial swelling  Eyes: Negative for discharge  Respiratory: Negative  Negative for cough and shortness of breath  Cardiovascular: Negative for chest pain and leg swelling  Gastrointestinal: Negative  Negative for abdominal distention, abdominal pain, constipation, diarrhea, nausea and vomiting  Endocrine: Negative  Genitourinary: Positive for frequency  Negative for decreased urine volume, difficulty urinating, dysuria, enuresis, flank pain, genital sores, hematuria and urgency  Sensation of incomplete bladder emptying, weak stream, nocturia x 2   Musculoskeletal: Negative for back pain and myalgias  Skin: Negative for pallor and rash  Allergic/Immunologic: Negative  Negative for immunocompromised state  Neurological: Negative for facial asymmetry and speech difficulty  Psychiatric/Behavioral: Negative for agitation and confusion  AUA SYMPTOM SCORE      Most Recent Value   AUA SYMPTOM SCORE    How often have you had a sensation of not emptying your bladder completely after you finished urinating? 3   How often have you had to urinate again less than two hours after you finished urinating? 3   How often have you found you stopped and started again several times when you urinate? 0   How often have you found it difficult to postpone urination? 3   How often have you had a weak urinary stream? 1   How often have you had to push or strain to begin urination? 2   How many times did you most typically get up to urinate from the time you went to bed at night until the time you got up in the morning?  2   Quality of Life: If you were to spend the rest of your life with your urinary condition just the way it is now, how would you feel about that? --   AUA SYMPTOM SCORE 14                Allergies     Allergies   Allergen Reactions    Penicillins Anaphylaxis       Physical Exam     Physical Exam  Vitals reviewed  Constitutional:       General: He is not in acute distress  Appearance: Normal appearance  He is normal weight  He is not ill-appearing, toxic-appearing or diaphoretic  HENT:      Head: Normocephalic and atraumatic  Eyes:      General: No scleral icterus  Cardiovascular:      Rate and Rhythm: Normal rate  Pulmonary:      Effort: Pulmonary effort is normal  No respiratory distress  Abdominal:      General: Abdomen is flat  There is no distension  Palpations: Abdomen is soft  Tenderness: There is no abdominal tenderness  There is no guarding or rebound  Hernia: There is no hernia in the left inguinal area or right inguinal area  Genitourinary:     Penis: Circumcised  No hypospadias, erythema, tenderness, discharge, swelling or lesions  Testes:         Right: Mass, tenderness or swelling not present  Right testis is descended  Left: Mass, tenderness or swelling not present  Left testis is descended  Epididymis:      Right: Not inflamed  No tenderness  Left: Not inflamed  No tenderness  Comments: Prostate smooth nontender without appreciable nodules  Musculoskeletal:         General: No swelling  Cervical back: Normal range of motion  Skin:     General: Skin is warm and dry  Coloration: Skin is not jaundiced or pale  Findings: No rash  Neurological:      General: No focal deficit present  Mental Status: He is alert and oriented to person, place, and time  Gait: Gait normal    Psychiatric:         Mood and Affect: Mood normal          Behavior: Behavior normal          Thought Content:  Thought content normal          Judgment: Judgment normal          Vital Signs     Vitals:    02/15/22 1002   BP: 122/74   BP Location: Left arm   Patient Position: Sitting   Cuff Size: Adult   Pulse: 56   Weight: 84 4 kg (186 lb)   Height: 6' 1" (1 854 m)       Current Medications       Current Outpatient Medications:     pantoprazole (PROTONIX) 20 mg tablet, Take 1 tablet (20 mg total) by mouth daily as needed for heartburn, Disp: 30 tablet, Rfl: 2    sildenafil (VIAGRA) 100 mg tablet, Take 1 tablet (100 mg total) by mouth daily as needed for erectile dysfunction, Disp: 9 tablet, Rfl: 2    tadalafil (CIALIS) 20 MG tablet, Take 1 tablet (20 mg total) by mouth daily as needed for erectile dysfunction, Disp: 10 tablet, Rfl: 4    Active Problems     Patient Active Problem List   Diagnosis    Depression    Erectile dysfunction of non-organic origin    Low testosterone    Obstructive sleep apnea (adult) (pediatric)    Prostate cancer screening    Urinary frequency       Past Medical History     History reviewed  No pertinent past medical history  Surgical History     Past Surgical History:   Procedure Laterality Date    HERNIA REPAIR Right     RT Inguinal Hernia Repair  Last Assessed: [id number]       Family History     Family History   Problem Relation Age of Onset    Cancer Father         Bladder Cancer    Colon cancer Maternal Grandmother     Diabetes Paternal Grandmother     Diabetes Paternal Grandfather        Social History     Social History     Social History     Tobacco Use   Smoking Status Former Smoker   Smokeless Tobacco Never Used       Past Surgical History:   Procedure Laterality Date    HERNIA REPAIR Right     RT Inguinal Hernia Repair   Last Assessed: [id number]         The following portions of the patient's history were reviewed and updated as appropriate: allergies, current medications, past family history, past medical history, past social history, past surgical history and problem list    Please note :  Voice dictation software has been used to create this document  There may be inadvertent transcription errors      [id number] [id number] Blaise Ritter

## 2022-02-15 NOTE — ASSESSMENT & PLAN NOTE
· Increase water intake  · Avoid bladder irritants, mainly coffee  · Follow up 1 year, sooner if symptoms bothersome

## 2022-02-15 NOTE — PATIENT INSTRUCTIONS
BLADDER HEALTH    WHAT IS CONSIDERED NORMAL?  The average bladder can hold about 2 cups of urine before it needs to be emptied   The normal range of voiding urine is 6 to 8 times during a 24 hour period  As we get older, our bladder capacity can get smaller and we may need to pass urine more frequently but usually not more than every 2 hours   Urine should flow easily without discomfort in a good, steady stream until the bladder is empty  No pushing or straining is necessary to empty the bladder   An urge is a signal that you feel as the bladder stretches to fill with urine  Urges can be felt even if the bladder is not full  Urges are not commands to go to the toilet, merely a signal and can be controlled  WHAT ARE GOOD BLADDER HABITS?  Take your time when emptying your bladder  Dont strain or push to empty your bladder  Make sure you empty your bladder completely each time you pass urine  Do not rush the process   Consistently ignoring the urge to go (waiting more than 4 hours between toileting) or urinating too infrequently may be convenient but not healthy for your bladder   Avoid going to the toilet just in case or more often than every 2 hours  It is usually not necessary to go when you feel the first urge  Try to go only when your bladder is full  Urgency and frequency of urination can be improved by retraining the bladder and spacing your fluid intake throughout the day  Practice good toilet habits  Dont let your bladder control your life  TIPS TO MAINTAIN GOOD BLADDER HABITS   Maintain a good fluid intake  Depending on your body size and environment, drink 6 -8 cups (8 oz each) of fluid per day unless otherwise advised by your doctor  Not enough fluid creates a foul odor and dark color of the urine     Limit the amount of caffeine (coffee, cola, chocolate or tea) and citrus foods that you consume as these foods can be associated with increased sensation of urinary urgency and frequency   Limit the amount of alcohol you drink  Alcohol increases urine production and makes it difficult for the brain to coordinate bladder control   Avoid constipation by maintaining a balanced diet of dietary fiber   Cigarette smoking is also irritating to the bladder surface and is associated with bladder cancer  In addition, the coughing associated with smoking may lead to increased incontinent episodes because of the increased pressure  HOW DIET CAN AFFECT YOUR BLADDER  Although there is no particular "diet" that can cure bladder control, there are certain dietary suggestions you can use to help control the problem  There are 2 points to consider when evaluating how your habits and diet may affect your bladder:    1  Foods and fluids can irritate the bladder  Some foods and beverages are thought to contribute to bladder leakage and irritability  However their effect on the bladder is not completely understood and you may want to see if eliminating one or all of these items improves your bladder control  If you are unable to give them up completely, it is recommended that you use the following items in moderation:   Acidic beverages and foods (orange juice, grapefruit juice, lemonade etc)   Alcoholic beverages   Vinegar   Coffee (regular and decaf)   Tea (regular and decaf)   Caffeinated beverages   Carbonated beverages          2  Drinking enough and the right kinds of fluids  Many people with bladder control issues decrease their intake of liquids in hope that they will need to urinate less frequently or have less urinary leakage   You should not restrict fluids to control your bladder  While a decrease in liquid intake does result in a decrease in the volume of urine, the smaller amount of urine may be more highly concentrated         Highly concentrated, dark yellow urine is irritating to the bladder surface and may actually cause you to go to the bathroom more frequently   It also encourages the growth of bacteria, which may lead to infections resulting in incontinence  Substitutions for BladdProstate specific antigen measurement   GENERAL INFORMATION:  What is this test?  This test measures the amount of prostate specific antigen (PSA) in blood  This test may be used when benign prostatic hyperplasia (BPH) is suspected  It may also be used to screen for prostate cancer, to help diagnose prostate cancer when it is suspected, and to monitor prostate cancer after treatment  What are other names for this test?  PSA measurement  PSA - Prostate-specific antigen level  PSA - Serum prostate specific antigen level  tPSA measurement - Total prostate specific antigen measurement  What are related tests? Rectal examination  Transrectal biopsy of prostate  Why do I need this test?  Laboratory tests may be done for many reasons  Tests are performed for routine health screenings or if a disease or toxicity is suspected  Lab tests may be used to determine if a medical condition is improving or worsening  Lab tests may also be used to measure the success or failure of a medication or treatment plan  Lab tests may be ordered for professional or legal reasons  You may need this test if you have:   BPH - Benign prostatic hypertrophy  Prostate cancer  When and how often should I have this test?  When and how often laboratory tests are done may depend on many factors  The timing of laboratory tests may rely on the results or completion of other tests, procedures, or treatments  Lab tests may be performed immediately in an emergency, or tests may be delayed as a condition is treated or monitored  A test may be suggested or become necessary when certain signs or symptoms appear  Due to changes in the way your body naturally functions through the course of a day, lab tests may need to be performed at a certain time of day   If you have prepared for a test by changing your food or fluid intake, lab tests may be timed in accordance with those changes  Timing of tests may be based on increased and decreased levels of medications, drugs or other substances in the body  The age or gender of the person being tested may affect when and how often a lab test is required  Chronic or progressive conditions may need ongoing monitoring through the use of lab tests  Conditions that worsen and improve may also need frequent monitoring  Certain tests may be repeated to obtain a series of results, or tests may need to be repeated to confirm or disprove results  Timing and frequency of lab tests may vary if they are performed for professional or legal reasons  How should I get ready for the test?  Before having blood collected, tell the person drawing your blood if you are allergic to latex  Tell the healthcare worker if you have a medical condition or are using a medication or supplement that causes excessive bleeding  Also tell the healthcare worker if you have felt nauseated, lightheaded, or have fainted while having blood drawn in the past   How is the test done? When a blood sample from a vein is needed, a vein in your arm is usually selected  A tourniquet (large rubber strap) may be secured above the vein  The skin over the vein will be cleaned, and a needle will be inserted  You will be asked to hold very still while your blood is collected  Blood will be collected into one or more tubes, and the tourniquet will be removed  When enough blood has been collected, the healthcare worker will take the needle out  How will the test feel? The amount of discomfort you feel will depend on many factors, including your sensitivity to pain  Communicate how you are feeling with the person doing the test  Inform the person doing the test if you feel that you cannot continue with the test   During a blood draw, you may feel mild discomfort at the location where the blood sample is being collected    What should I do after the test?  After a blood sample is collected from your vein, a bandage, cotton ball, or gauze may be placed on the area where the needle was inserted  You may be asked to apply pressure to the area  Avoid strenuous exercise immediately after your blood draw  Contact your healthcare worker if you feel pain or see redness, swelling, or discharge from the puncture site  What are the risks? Blood: During a blood draw, a hematoma (blood-filled bump under the skin) or slight bleeding from the puncture site may occur  After a blood draw, a bruise or infection may occur at the puncture site  The person doing this test may need to perform it more than once  Talk to your healthcare worker if you have any concerns about the risks of this test   What are normal results for this test?  Laboratory test results may vary depending on your age, gender, health history, the method used for the test, and many other factors  If your results are different from the results suggested below, this may not mean that you have a disease  Contact your healthcare worker if you have any questions  The following are considered to be normal results for this test:  Males, ?36years of age: 0-2 ng/mL (0-2 mcg/L) :  Males, >36years of age: 0-4 ng/mL (0-4 mcg/L)  Females: <0 5 ng/mL (<0 5 mcg/L) : What might affect my test results? Drug Therapy Use:  Some medications may affect the results of the test  These medications include:  Finasteride (Oral route, Tablet)  Indium In 111 Capromab Pendetide (Intravenous route, Kit)  Ejaculation and digital rectal exam can cause an insignificant rise in PSA levels while prostate biopsy and cystoscopy can cause substantial increases; PSA testing should be delayed until 3 to 4 weeks after these procedures  Exercise, infection, and some medications can also cause a rise in PSA levels  Finasteride will lower PSA by approximately 50%     What follow up should I do after this test?  Ask your healthcare worker how you will be informed of the test results  You may be asked to call for results, schedule an appointment to discuss results, or notified of results by mail  Follow up care varies depending on many factors related to your test  Sometimes there is no follow up after you have been notified of test results  At other times follow up may be suggested or necessary  Some examples of follow up care include changes to medication or treatment plans, referral to a specialist, more or less frequent monitoring, and additional tests or procedures  Talk with your healthcare worker about any concerns or questions you have regarding follow up care or instructions  Where can I get more information? Related Companies   American Urological Association - https://www deleon org/  org  National Kidney and Urologic Diseases Information Clearinghouse - http://kidney  niddk nih gov/    CARE AGREEMENT:   You have the right to help plan your care  To help with this plan, you must learn about your health condition and how it may be treated  You can then discuss treatment options with your caregivers  Work with them to decide what care may be used to treat you  You always have the right to refuse treatment  © Copyright [employer] 2021  Information is for End User's use only and may not be sold, redistributed or otherwise used for commercial purposes  The above information is an  only  It is not intended as a substitute for medical advice for your individual conditions or treatments  Talk to your doctor, nurse or pharmacist before following any medical regimen to see if it is safe and effective for you   er Irritants: water is always the best beverage choice    Grape and apple juice are thirst quenchers are good selections and are not as irritating to the bladder   o Low acid fruits:  Pears, apricots, papaya, watermelon  o For coffee drinkers: KAVA®, Postum®, Holger®, Kaffree Monrovia®  o For tea drinkers:  non-citrus or herbal and sun brewed tea    BEHAVIORAL THERAPY FOR IMPROVED BLADDER CONTROL      1  Urge Control Techniques       A  Stop whatever you are doing and concentrate on hal your pelvic floor muscles  B   Contract your pelvic floor muscles repetitively (as in your "flick" exercises)  C   Once the urgency has subsided, realize that sometimes the urgency is because you have a             full bladder and have to urinate  Other times the urgency is a false signal and you do not have a full bladder  D  If you have urgency triggered by running water, "key in the door," getting up from a sitting position, etc , contract your pelvic floor muscles prior to       initiating the activity  2   Daily Fluid Intake       A  Avoid drinking too little (less than 3 cups/day) or drinking too much (more than 6             cups/day)  B   Avoid caffeinated beverages  C   If voiding frequently at night, limit your liquid intake after 7 p m  3   Bowel Regularity--Constipation Makes Bladder Symptoms Worse       A  Drink enough liquids, eat plenty of high fiber food  B  Exercise       C  Avoid laxatives and enemas on a regular basis, this decreases the bowel's normal function  4   Voiding Schedules       A  Empty your bladder at 1½ to 2 hour intervals even if you may not have the urge to urinate             at that time  You may gradually increase the time between voidings to 30  minutes, until you can comfortably void every 3 hours  B  If you are voiding more frequently than every 1½ to 2 hours, resist the urge to go  by using urge control techniques (described in 1 )

## 2022-02-16 ENCOUNTER — TELEPHONE (OUTPATIENT)
Dept: UROLOGY | Facility: CLINIC | Age: 52
End: 2022-02-16

## 2022-02-16 NOTE — TELEPHONE ENCOUNTER
Called and left voicemail message per communication consent regarding negative urine testing results

## 2022-02-16 NOTE — TELEPHONE ENCOUNTER
Patient called stating he was returning call  Results of urine test given negative results    Patient verbalized understanding

## 2022-02-28 ENCOUNTER — OFFICE VISIT (OUTPATIENT)
Dept: FAMILY MEDICINE CLINIC | Facility: CLINIC | Age: 52
End: 2022-02-28
Payer: COMMERCIAL

## 2022-02-28 ENCOUNTER — TELEPHONE (OUTPATIENT)
Dept: FAMILY MEDICINE CLINIC | Facility: CLINIC | Age: 52
End: 2022-02-28

## 2022-02-28 VITALS
DIASTOLIC BLOOD PRESSURE: 82 MMHG | OXYGEN SATURATION: 97 % | TEMPERATURE: 98 F | RESPIRATION RATE: 14 BRPM | WEIGHT: 179 LBS | HEART RATE: 76 BPM | BODY MASS INDEX: 23.72 KG/M2 | SYSTOLIC BLOOD PRESSURE: 134 MMHG | HEIGHT: 73 IN

## 2022-02-28 DIAGNOSIS — F52.21 ERECTILE DYSFUNCTION OF NON-ORGANIC ORIGIN: ICD-10-CM

## 2022-02-28 DIAGNOSIS — M79.605 LEG PAIN, LEFT: Primary | ICD-10-CM

## 2022-02-28 DIAGNOSIS — K21.9 GASTROESOPHAGEAL REFLUX DISEASE WITHOUT ESOPHAGITIS: ICD-10-CM

## 2022-02-28 DIAGNOSIS — R79.89 LOW TESTOSTERONE: ICD-10-CM

## 2022-02-28 PROCEDURE — 3008F BODY MASS INDEX DOCD: CPT | Performed by: NURSE PRACTITIONER

## 2022-02-28 PROCEDURE — 99214 OFFICE O/P EST MOD 30 MIN: CPT | Performed by: FAMILY MEDICINE

## 2022-02-28 RX ORDER — PANTOPRAZOLE SODIUM 20 MG/1
20 TABLET, DELAYED RELEASE ORAL DAILY PRN
Qty: 30 TABLET | Refills: 2 | Status: SHIPPED | OUTPATIENT
Start: 2022-02-28 | End: 2022-05-31

## 2022-02-28 RX ORDER — TADALAFIL 20 MG/1
20 TABLET ORAL DAILY PRN
Qty: 10 TABLET | Refills: 4 | Status: SHIPPED | OUTPATIENT
Start: 2022-02-28

## 2022-02-28 NOTE — PROGRESS NOTES
Assessment/Plan:  Consider follow-up with orthopedics if no improvement  Side effect profile medication reviewed  Recommend return to office for recheck if no improvement in symptoms  Venous Doppler ordered  Refills on other medications also given today  Medication list reviewed  Cialis is working for him as well as pantoprazole  1  Leg pain, left  -     VAS lower limb venous duplex study, unilateral/limited; Future; Expected date: 02/28/2022    2  Low testosterone    3  Erectile dysfunction of non-organic origin  -     tadalafil (CIALIS) 20 MG tablet; Take 1 tablet (20 mg total) by mouth daily as needed for erectile dysfunction    4  Gastroesophageal reflux disease without esophagitis  -     pantoprazole (PROTONIX) 20 mg tablet; Take 1 tablet (20 mg total) by mouth daily as needed for heartburn          Subjective:      Patient ID: Hammad Merritt is a 46 y o  male  Patient with pain to the left calf for 2-3 weeks  Symptoms are intermittent and mild  Denies any edema  No previous history of DVT and no family history of clot  The following portions of the patient's history were reviewed and updated as appropriate: allergies, current medications, past family history, past medical history, past social history, past surgical history, and problem list     Review of Systems      Objective:      /82 (BP Location: Left arm, Patient Position: Sitting, Cuff Size: Standard)   Pulse 76   Temp 98 °F (36 7 °C) (Temporal)   Resp 14   Ht 6' 1" (1 854 m)   Wt 81 2 kg (179 lb)   SpO2 97%   BMI 23 62 kg/m²          Physical Exam  Vitals reviewed  Constitutional:       Appearance: He is well-developed  HENT:      Head: Normocephalic and atraumatic  Right Ear: External ear normal  Tympanic membrane is not erythematous or bulging  Left Ear: External ear normal  Tympanic membrane is not erythematous or bulging  Nose: Nose normal       Mouth/Throat:      Mouth: No oral lesions  Pharynx: No oropharyngeal exudate  Eyes:      General: No scleral icterus  Right eye: No discharge  Left eye: No discharge  Conjunctiva/sclera: Conjunctivae normal    Neck:      Thyroid: No thyromegaly  Cardiovascular:      Rate and Rhythm: Normal rate and regular rhythm  Heart sounds: Normal heart sounds  No murmur heard  No friction rub  No gallop  Pulmonary:      Effort: Pulmonary effort is normal  No respiratory distress  Breath sounds: No wheezing or rales  Chest:      Chest wall: No tenderness  Abdominal:      General: Bowel sounds are normal  There is no distension  Palpations: Abdomen is soft  There is no mass  Tenderness: There is no abdominal tenderness  There is no guarding or rebound  Musculoskeletal:         General: No tenderness or deformity  Normal range of motion  Cervical back: Normal range of motion and neck supple  Comments: Negative Homans sign  No point tenderness  Full range of motion and normal gait  Lymphadenopathy:      Cervical: No cervical adenopathy  Skin:     General: Skin is warm and dry  Coloration: Skin is not pale  Findings: No erythema or rash  Neurological:      Mental Status: He is alert and oriented to person, place, and time  Cranial Nerves: No cranial nerve deficit  Motor: No abnormal muscle tone  Coordination: Coordination normal       Deep Tendon Reflexes: Reflexes are normal and symmetric     Psychiatric:         Behavior: Behavior normal

## 2022-02-28 NOTE — TELEPHONE ENCOUNTER
Pt's wife called asking if it is alright for the pt to be taking an aspirin a day to help with the blood clot in the pt's leg  Please advise  Thank you

## 2022-03-01 NOTE — TELEPHONE ENCOUNTER
Spoke w/ pt and advised from Dr Trent Gonzalez verbal Migdalia Martel [place] spoke with provider) that he would like to wait on anything until after we get the imaging test results

## 2022-03-02 ENCOUNTER — HOSPITAL ENCOUNTER (OUTPATIENT)
Dept: NON INVASIVE DIAGNOSTICS | Facility: HOSPITAL | Age: 52
Discharge: HOME | End: 2022-03-02
Payer: COMMERCIAL

## 2022-03-02 DIAGNOSIS — M79.605 LEG PAIN, LEFT: ICD-10-CM

## 2022-03-02 PROCEDURE — 93971 EXTREMITY STUDY: CPT | Performed by: SURGERY

## 2022-03-02 PROCEDURE — 93971 EXTREMITY STUDY: CPT

## 2022-05-31 ENCOUNTER — OFFICE VISIT (OUTPATIENT)
Dept: FAMILY MEDICINE CLINIC | Facility: CLINIC | Age: 52
End: 2022-05-31
Payer: COMMERCIAL

## 2022-05-31 VITALS
SYSTOLIC BLOOD PRESSURE: 134 MMHG | TEMPERATURE: 98.3 F | HEIGHT: 72 IN | DIASTOLIC BLOOD PRESSURE: 74 MMHG | HEART RATE: 70 BPM | BODY MASS INDEX: 23.95 KG/M2 | WEIGHT: 176.8 LBS | OXYGEN SATURATION: 97 %

## 2022-05-31 DIAGNOSIS — R68.89 ABNORMAL ANKLE BRACHIAL INDEX: ICD-10-CM

## 2022-05-31 DIAGNOSIS — Z11.4 SCREENING FOR HIV (HUMAN IMMUNODEFICIENCY VIRUS): ICD-10-CM

## 2022-05-31 DIAGNOSIS — Z00.00 WELL ADULT EXAM: Primary | ICD-10-CM

## 2022-05-31 DIAGNOSIS — Z11.59 NEED FOR HEPATITIS C SCREENING TEST: ICD-10-CM

## 2022-05-31 PROCEDURE — 99396 PREV VISIT EST AGE 40-64: CPT | Performed by: FAMILY MEDICINE

## 2022-05-31 NOTE — PROGRESS NOTES
Assessment/Plan:  We discussed treatment options  He did recently have venous duplex done for leg pain that was normal   He no longer is having leg pain and certainly is not having any on the right side  He is not a smoker and is at relatively low risk for arterial occlusion on the right side  Consider follow-up with vascular specialist if he would like a 2nd opinion but reassurance provided that screening is likely more a result of measuring error verses pathology  Card given for vascular specialist   Anticipatory guidance provided  1  Well adult exam    2  Abnormal ankle brachial index    3  Need for hepatitis C screening test  -     Hepatitis C Antibody ([facility]); Future    4  Screening for HIV (human immunodeficiency virus)  -     HIV 1/2 Antigen/Antibody (4th Generation) w Reflex [facility]; Future          Subjective:      Patient ID: Haily Porter is a 46 y o  male  Patient is seen today to discuss recent biometric screening test he had done  His lab testing all looks okay  He apparently had ankle brachial index done showing that the right side was slightly worse in the left  He does have normal blood pressures and has not been a smoker for 20 years  He did have some left-sided leg pain but the right side ankle brachial index was borderline abnormal   Although testing was fine  He is otherwise feeling well today  The following portions of the patient's history were reviewed and updated as appropriate: allergies, current medications, past family history, past medical history, past social history, past surgical history, and problem list     Review of Systems   Constitutional: Negative  HENT: Negative  Eyes: Negative  Respiratory: Negative  Cardiovascular: Negative  Gastrointestinal: Negative  Endocrine: Negative  Genitourinary: Negative  Musculoskeletal: Negative  Skin: Negative  Allergic/Immunologic: Negative  Neurological: Negative  Hematological: Negative  Psychiatric/Behavioral: Negative  Objective:      /74 (BP Location: Left arm, Patient Position: Sitting, Cuff Size: Standard)   Pulse 70   Temp 98 3 °F (36 8 °C) (Temporal)   Ht 6' (1 829 m)   Wt 80 2 kg (176 lb 12 8 oz)   SpO2 97%   BMI 23 98 kg/m²          Physical Exam  Vitals reviewed  Constitutional:       Appearance: He is well-developed  HENT:      Head: Normocephalic and atraumatic  Right Ear: External ear normal  Tympanic membrane is not erythematous or bulging  Left Ear: External ear normal  Tympanic membrane is not erythematous or bulging  Nose: Nose normal       Mouth/Throat:      Mouth: No oral lesions  Pharynx: No oropharyngeal exudate  Eyes:      General: No scleral icterus  Right eye: No discharge  Left eye: No discharge  Conjunctiva/sclera: Conjunctivae normal    Neck:      Thyroid: No thyromegaly  Cardiovascular:      Rate and Rhythm: Normal rate and regular rhythm  Heart sounds: Normal heart sounds  No murmur heard  No friction rub  No gallop  Pulmonary:      Effort: Pulmonary effort is normal  No respiratory distress  Breath sounds: No wheezing or rales  Chest:      Chest wall: No tenderness  Abdominal:      General: Bowel sounds are normal  There is no distension  Palpations: Abdomen is soft  There is no mass  Tenderness: There is no abdominal tenderness  There is no guarding or rebound  Musculoskeletal:         General: No tenderness or deformity  Normal range of motion  Cervical back: Normal range of motion and neck supple  Lymphadenopathy:      Cervical: No cervical adenopathy  Skin:     General: Skin is warm and dry  Coloration: Skin is not pale  Findings: No erythema or rash  Neurological:      Mental Status: He is alert and oriented to person, place, and time  Cranial Nerves: No cranial nerve deficit        Motor: No abnormal muscle tone       Coordination: Coordination normal       Deep Tendon Reflexes: Reflexes are normal and symmetric     Psychiatric:         Behavior: Behavior normal

## 2022-09-09 ENCOUNTER — OFFICE VISIT (OUTPATIENT)
Dept: FAMILY MEDICINE CLINIC | Facility: CLINIC | Age: 52
End: 2022-09-09
Payer: COMMERCIAL

## 2022-09-09 VITALS
TEMPERATURE: 98 F | DIASTOLIC BLOOD PRESSURE: 70 MMHG | SYSTOLIC BLOOD PRESSURE: 118 MMHG | HEART RATE: 56 BPM | WEIGHT: 175.6 LBS | HEIGHT: 73 IN | BODY MASS INDEX: 23.27 KG/M2 | OXYGEN SATURATION: 97 %

## 2022-09-09 DIAGNOSIS — G47.33 OBSTRUCTIVE SLEEP APNEA (ADULT) (PEDIATRIC): ICD-10-CM

## 2022-09-09 DIAGNOSIS — M79.605 LEFT LEG PAIN: Primary | ICD-10-CM

## 2022-09-09 DIAGNOSIS — Z12.5 SCREENING FOR PROSTATE CANCER: ICD-10-CM

## 2022-09-09 DIAGNOSIS — R79.89 LOW TESTOSTERONE: ICD-10-CM

## 2022-09-09 PROCEDURE — 99214 OFFICE O/P EST MOD 30 MIN: CPT | Performed by: FAMILY MEDICINE

## 2022-09-09 NOTE — PROGRESS NOTES
Assessment/Plan:  Recommend annual blood testing  Recommend venous duplex to rule out DVT  We discussed that he likely has mild to moderate calf strain  Consider orthopedic evaluation if no improvement or worsening symptoms  Time spent counseling and reviewing treatment plan as well as coordinating care and documentation was 30 minutes  1  Left leg pain  -     VAS lower limb venous duplex study, unilateral/limited; Future; Expected date: 09/09/2022  -     CBC and differential  -     Comprehensive metabolic panel  -     Lipid Panel with Direct LDL reflex    2  Low testosterone  -     CBC and differential  -     Comprehensive metabolic panel  -     Lipid Panel with Direct LDL reflex    3  Obstructive sleep apnea (adult) (pediatric)    4  Screening for prostate cancer  -     PSA, Total Screen; Future          Subjective:      Patient ID: Elena Pantoja is a 45 y o  male  Patient with left-sided calf tenderness for the last 1-2 weeks  Symptoms are generally mild  Denies any chest pain or shortness of breath  No prior history of blood clots  No recent risk factors for blood clots  Pain is mild and does not worsen with walking or standing or motion  Denies proximal leg pain  The following portions of the patient's history were reviewed and updated as appropriate: allergies, current medications, past family history, past medical history, past social history, past surgical history, and problem list     Review of Systems   Constitutional: Negative  HENT: Negative  Eyes: Negative  Respiratory: Negative  Cardiovascular: Negative  Gastrointestinal: Negative  Endocrine: Negative  Genitourinary: Negative  Musculoskeletal: Positive for myalgias  Skin: Negative  Allergic/Immunologic: Negative  Neurological: Negative  Hematological: Negative  Psychiatric/Behavioral: Negative            Objective:      /70 (BP Location: Left arm, Patient Position: Sitting, Cuff Size: Standard)   Pulse 56   Temp 98 °F (36 7 °C) (Temporal)   Ht 6' 1" (1 854 m)   Wt 79 7 kg (175 lb 9 6 oz)   SpO2 97%   BMI 23 17 kg/m²          Physical Exam  Vitals reviewed  Constitutional:       Appearance: He is well-developed  HENT:      Head: Normocephalic and atraumatic  Right Ear: External ear normal  Tympanic membrane is not erythematous or bulging  Left Ear: External ear normal  Tympanic membrane is not erythematous or bulging  Nose: Nose normal       Mouth/Throat:      Mouth: No oral lesions  Pharynx: No oropharyngeal exudate  Eyes:      General: No scleral icterus  Right eye: No discharge  Left eye: No discharge  Conjunctiva/sclera: Conjunctivae normal    Neck:      Thyroid: No thyromegaly  Cardiovascular:      Rate and Rhythm: Normal rate and regular rhythm  Heart sounds: Normal heart sounds  No murmur heard  No friction rub  No gallop  Pulmonary:      Effort: Pulmonary effort is normal  No respiratory distress  Breath sounds: No wheezing or rales  Chest:      Chest wall: No tenderness  Abdominal:      General: Bowel sounds are normal  There is no distension  Palpations: Abdomen is soft  There is no mass  Tenderness: There is no abdominal tenderness  There is no guarding or rebound  Musculoskeletal:         General: No tenderness or deformity  Normal range of motion  Cervical back: Normal range of motion and neck supple  Lymphadenopathy:      Cervical: No cervical adenopathy  Skin:     General: Skin is warm and dry  Coloration: Skin is not pale  Findings: No erythema or rash  Neurological:      Mental Status: He is alert and oriented to person, place, and time  Cranial Nerves: No cranial nerve deficit  Motor: No abnormal muscle tone  Coordination: Coordination normal       Deep Tendon Reflexes: Reflexes are normal and symmetric     Psychiatric:         Behavior: Behavior normal

## 2022-10-06 ENCOUNTER — OFFICE VISIT (OUTPATIENT)
Dept: URGENT CARE | Facility: CLINIC | Age: 52
End: 2022-10-06
Payer: COMMERCIAL

## 2022-10-06 VITALS
RESPIRATION RATE: 18 BRPM | SYSTOLIC BLOOD PRESSURE: 125 MMHG | OXYGEN SATURATION: 97 % | HEART RATE: 72 BPM | DIASTOLIC BLOOD PRESSURE: 69 MMHG | TEMPERATURE: 97.6 F

## 2022-10-06 DIAGNOSIS — M79.605 PAIN OF LEFT LOWER EXTREMITY: Primary | ICD-10-CM

## 2022-10-06 PROCEDURE — 99213 OFFICE O/P EST LOW 20 MIN: CPT | Performed by: NURSE PRACTITIONER

## 2022-10-06 RX ORDER — NAPROXEN 375 MG/1
375 TABLET ORAL 2 TIMES DAILY WITH MEALS
Qty: 30 TABLET | Refills: 0 | Status: SHIPPED | OUTPATIENT
Start: 2022-10-06

## 2022-10-06 NOTE — PROGRESS NOTES
[facility]        NAME: Harriet Persaud is a 45 y o  male  : 1970    MRN: [id number]  DATE: 2022  TIME: 2:55 PM    Assessment and Plan   Pain of left lower extremity [M79 605]  1  Pain of left lower extremity  naproxen (NAPROSYN) 375 mg tablet         Patient Instructions     Patient Instructions   Rest, ice, elevate  Tylenol as needed for pain  If you develop any increased pain, swelling, redness, warmth, numbness, tingling, or any new or concerning symptoms please return or proceed to ER  Follow up with pcp in 3-5 days  Follow up with PCP in 3-5 days  Proceed to  ER if symptoms worsen  Chief Complaint     Chief Complaint   Patient presents with    Leg Pain     Left lower leg pain for a week, no injury  History of Present Illness       Leg Pain   The incident occurred 5 to 7 days ago  The incident occurred at home  There was no injury mechanism  The pain is present in the left leg  The quality of the pain is described as aching  The pain is mild  The pain has been intermittent (not currently present) since onset  Pertinent negatives include no inability to bear weight, loss of motion, loss of sensation, muscle weakness, numbness or tingling  He reports no foreign bodies present  Nothing aggravates the symptoms  He has tried nothing for the symptoms  Review of Systems   Review of Systems   Constitutional: Negative for chills, diaphoresis, fatigue and fever  Respiratory: Negative for cough, chest tightness, shortness of breath, wheezing and stridor  Cardiovascular: Negative for chest pain, palpitations and leg swelling  Musculoskeletal: Positive for myalgias  Negative for arthralgias, back pain, joint swelling, neck pain and neck stiffness  Skin: Negative for rash  Neurological: Negative for tingling, weakness and numbness           Current Medications       Current Outpatient Medications:     naproxen (NAPROSYN) 375 mg tablet, Take 1 tablet (375 mg total) by mouth 2 (two) times a day with meals, Disp: 30 tablet, Rfl: 0    pantoprazole (PROTONIX) 20 mg tablet, Take 1 tablet (20 mg total) by mouth daily as needed for heartburn, Disp: 30 tablet, Rfl: 2    sildenafil (VIAGRA) 100 mg tablet, Take 1 tablet (100 mg total) by mouth daily as needed for erectile dysfunction (Patient not taking: No sig reported), Disp: 9 tablet, Rfl: 2    tadalafil (CIALIS) 20 MG tablet, Take 1 tablet (20 mg total) by mouth daily as needed for erectile dysfunction, Disp: 10 tablet, Rfl: 4    Current Allergies     Allergies as of 10/06/2022 - Reviewed 10/06/2022   Allergen Reaction Noted    Penicillins Anaphylaxis 01/14/2013            The following portions of the patient's history were reviewed and updated as appropriate: allergies, current medications, past family history, past medical history, past social history, past surgical history and problem list      No past medical history on file  Past Surgical History:   Procedure Laterality Date    HERNIA REPAIR Right     RT Inguinal Hernia Repair  Last Assessed: [id number]       Family History   Problem Relation Age of Onset    Cancer Father         Bladder Cancer    Colon cancer Maternal Grandmother     Diabetes Paternal Grandmother     Diabetes Paternal Grandfather          Medications have been verified  Objective   /69   Pulse 72   Temp 97 6 °F (36 4 °C)   Resp 18   SpO2 97%   No LMP for male patient  Physical Exam     Physical Exam  Constitutional:       General: He is not in acute distress  Appearance: Normal appearance  He is not diaphoretic  HENT:      Head: Normocephalic and atraumatic  Cardiovascular:      Rate and Rhythm: Normal rate and regular rhythm  Heart sounds: Normal heart sounds, S1 normal and S2 normal    Pulmonary:      Effort: Pulmonary effort is normal       Breath sounds: Normal breath sounds and air entry     Musculoskeletal:      Left lower leg: Normal  No swelling, deformity, lacerations, tenderness or bony tenderness  No edema  Left ankle: Normal       Comments: No erythema, swelling, or warmth  -latha's sign   Neurological:      Mental Status: He is alert

## 2022-10-11 PROBLEM — Z12.5 PROSTATE CANCER SCREENING: Status: RESOLVED | Noted: 2022-02-15 | Resolved: 2022-10-11

## 2022-10-13 ENCOUNTER — APPOINTMENT (OUTPATIENT)
Dept: RADIOLOGY | Facility: CLINIC | Age: 52
End: 2022-10-13
Payer: COMMERCIAL

## 2022-10-13 ENCOUNTER — OFFICE VISIT (OUTPATIENT)
Dept: OBGYN CLINIC | Facility: CLINIC | Age: 52
End: 2022-10-13
Payer: COMMERCIAL

## 2022-10-13 VITALS
HEART RATE: 64 BPM | WEIGHT: 174.6 LBS | DIASTOLIC BLOOD PRESSURE: 77 MMHG | HEIGHT: 73 IN | BODY MASS INDEX: 23.14 KG/M2 | SYSTOLIC BLOOD PRESSURE: 122 MMHG

## 2022-10-13 DIAGNOSIS — M54.16 LUMBAR RADICULOPATHY: ICD-10-CM

## 2022-10-13 DIAGNOSIS — M79.662 PAIN IN LEFT LOWER LEG: ICD-10-CM

## 2022-10-13 DIAGNOSIS — M79.662 PAIN IN LEFT LOWER LEG: Primary | ICD-10-CM

## 2022-10-13 PROCEDURE — 99204 OFFICE O/P NEW MOD 45 MIN: CPT | Performed by: FAMILY MEDICINE

## 2022-10-13 PROCEDURE — 73590 X-RAY EXAM OF LOWER LEG: CPT

## 2022-10-13 RX ORDER — GABAPENTIN 100 MG/1
CAPSULE ORAL
Qty: 90 CAPSULE | Refills: 1 | Status: SHIPPED | OUTPATIENT
Start: 2022-10-13

## 2022-10-13 NOTE — PROGRESS NOTES
[facility] [place]  [address]  [facility] [id number] [phone]      Chief Complaint:  Chief Complaint   Patient presents with   • Left Lower Leg - Pain, New Patient Visit       Vitals:  /77 (BP Location: Left arm, Patient Position: Sitting, Cuff Size: Standard)   Pulse 64   Ht 6' 1" (1 854 m)   Wt 79 2 kg (174 lb 9 6 oz)   BMI 23 04 kg/m²     The following portions of the patient's history were reviewed and updated as appropriate: allergies, current medications, past family history, past medical history, past social history, past surgical history, and problem list       Subjective:   Patient ID: Laura Stovall is a 45 y o  male  Here c/o L calf pain  Pain has resolved  But has pain 2 wks ago  Seen in UC  Pain just comes randomly- lasts 10-45 sec, but keeps coming back every few minutes  crippling pain/sharp pain  Denies n/t  Kept him up at night this last episode  Tired asa, tylenol/motrin/aleve- no help  Hx of nerve ablation for chronic back pain      Review of Systems   Constitutional: Negative for fatigue and fever  Respiratory: Negative for shortness of breath  Cardiovascular: Negative for chest pain  Gastrointestinal: Negative for abdominal pain and nausea  Genitourinary: Negative for dysuria  Musculoskeletal: Positive for myalgias  Skin: Negative for rash and wound  Neurological: Negative for weakness and headaches  Objective:  Ortho Exam    Physical Exam  Vitals and nursing note reviewed  Constitutional:       Appearance: Normal appearance  He is well-developed  HENT:      Head: Normocephalic  Mouth/Throat:      Mouth: Mucous membranes are moist    Eyes:      Extraocular Movements: Extraocular movements intact  Cardiovascular:      Rate and Rhythm: Normal rate and regular rhythm  Heart sounds: Normal heart sounds     Pulmonary:      Effort: Pulmonary effort is normal       Breath sounds: Normal breath sounds  Abdominal:      General: Bowel sounds are normal       Palpations: Abdomen is soft  Musculoskeletal:         General: No tenderness  Normal range of motion  Cervical back: Normal range of motion  Comments: L calf no TTP, neg berrios test  Distal medial tibial mild TTP  Lateral prox to mid fibula mild TTP   Skin:     General: Skin is warm and dry  Neurological:      General: No focal deficit present  Mental Status: He is alert and oriented to person, place, and time  Psychiatric:         Mood and Affect: Mood normal          Behavior: Behavior normal          Thought Content: Thought content normal          I have personally reviewed pertinent films in PACS and my interpretation is XR-  L tib/fib-  nml study  Assessment/Plan:  Assessment/Plan   Diagnoses and all orders for this visit:    Pain in left lower leg  -     XR tibia fibula 2 vw left; Future  -     gabapentin (NEURONTIN) 100 mg capsule; Take 1 T PO tonight, then 1T PO BID tomorrow, then 1T PO TID thereafter  Lumbar radiculopathy  -     gabapentin (NEURONTIN) 100 mg capsule; Take 1 T PO tonight, then 1T PO BID tomorrow, then 1T PO TID thereafter  Return if symptoms worsen or fail to improve       Cesar Mejia

## 2022-10-13 NOTE — PATIENT INSTRUCTIONS
F/u as needed  Most likely pain a result of lumbar radiculopathy  Start gabapentin when pain begins- stop when pain resolves

## 2022-11-10 PROBLEM — R19.4 CHANGE IN BOWEL HABITS: Status: ACTIVE | Noted: 2022-11-10

## 2022-11-22 ENCOUNTER — LAB REQUISITION (OUTPATIENT)
Dept: LAB | Facility: HOSPITAL | Age: 52
End: 2022-11-22

## 2022-11-22 DIAGNOSIS — R19.4 CHANGE IN BOWEL HABIT: ICD-10-CM

## 2022-11-22 DIAGNOSIS — K57.30 DIVERTICULOSIS OF LARGE INTESTINE WITHOUT PERFORATION OR ABSCESS WITHOUT BLEEDING: ICD-10-CM

## 2023-11-21 ENCOUNTER — OFFICE VISIT (OUTPATIENT)
Dept: FAMILY MEDICINE CLINIC | Facility: CLINIC | Age: 53
End: 2023-11-21
Payer: COMMERCIAL

## 2023-11-21 VITALS
OXYGEN SATURATION: 99 % | DIASTOLIC BLOOD PRESSURE: 73 MMHG | BODY MASS INDEX: 22.53 KG/M2 | HEART RATE: 58 BPM | TEMPERATURE: 97.6 F | SYSTOLIC BLOOD PRESSURE: 124 MMHG | HEIGHT: 73 IN | WEIGHT: 170 LBS

## 2023-11-21 DIAGNOSIS — F52.21 ERECTILE DYSFUNCTION OF NON-ORGANIC ORIGIN: ICD-10-CM

## 2023-11-21 DIAGNOSIS — R07.89 ATYPICAL CHEST PAIN: ICD-10-CM

## 2023-11-21 DIAGNOSIS — Z12.5 SCREENING FOR PROSTATE CANCER: ICD-10-CM

## 2023-11-21 DIAGNOSIS — Z00.00 WELL ADULT EXAM: Primary | ICD-10-CM

## 2023-11-21 PROBLEM — M47.816 LUMBAR SPONDYLOSIS: Status: ACTIVE | Noted: 2022-01-10

## 2023-11-21 PROBLEM — R19.4 CHANGE IN BOWEL HABITS: Status: RESOLVED | Noted: 2022-11-10 | Resolved: 2023-11-21

## 2023-11-21 PROBLEM — M48.061 SPINAL STENOSIS OF LUMBAR REGION: Status: ACTIVE | Noted: 2021-11-03

## 2023-11-21 PROBLEM — R35.0 URINARY FREQUENCY: Status: RESOLVED | Noted: 2022-02-15 | Resolved: 2023-11-21

## 2023-11-21 PROCEDURE — 93000 ELECTROCARDIOGRAM COMPLETE: CPT | Performed by: FAMILY MEDICINE

## 2023-11-21 PROCEDURE — 99396 PREV VISIT EST AGE 40-64: CPT | Performed by: FAMILY MEDICINE

## 2023-11-21 RX ORDER — TADALAFIL 20 MG/1
20 TABLET ORAL DAILY PRN
Qty: 30 TABLET | Refills: 4 | Status: SHIPPED | OUTPATIENT
Start: 2023-11-21

## 2023-11-21 NOTE — PROGRESS NOTES
Assessment/Plan: Await chest x-ray and stress testing. ER evaluation if symptoms return. 1. Well adult exam  -     POCT ECG  -     CBC and differential  -     Comprehensive metabolic panel  -     Hemoglobin A1C    2. Erectile dysfunction of non-organic origin  -     tadalafil (CIALIS) 20 MG tablet; Take 1 tablet (20 mg total) by mouth daily as needed for erectile dysfunction    3. Atypical chest pain  -     Stress test only, exercise; Future; Expected date: 11/21/2023  -     XR chest pa & lateral; Future; Expected date: 11/21/2023  -     POCT ECG  -     CBC and differential  -     Comprehensive metabolic panel  -     Lipid Panel with Direct LDL reflex  -     Hemoglobin A1C    4. Screening for prostate cancer  -     PSA, Total Screen; Future          Subjective:      Patient ID: Isabell Hale is a 52 y.o. male. Patient here for well check. He does note occasionally when doing things where he exerts himself including walking his dog that he gets winded and occasional tightness in the chest but not recently. Note chest tightness at rest.  He otherwise is feeling well. The following portions of the patient's history were reviewed and updated as appropriate: allergies, current medications, past family history, past medical history, past social history, past surgical history, and problem list.    Review of Systems   Constitutional: Negative. HENT: Negative. Eyes: Negative. Respiratory: Negative. Cardiovascular: Negative. Gastrointestinal: Negative. Endocrine: Negative. Genitourinary: Negative. Musculoskeletal: Negative. Skin: Negative. Allergic/Immunologic: Negative. Neurological: Negative. Hematological: Negative. Psychiatric/Behavioral: Negative.            Objective:      /73 (BP Location: Left arm, Patient Position: Sitting, Cuff Size: Standard)   Pulse 58   Temp 97.6 °F (36.4 °C) (Tympanic)   Ht 6' 1" (1.854 m)   Wt 77.1 kg (170 lb)   SpO2 99% BMI 22.43 kg/m²          Physical Exam  Vitals reviewed. Constitutional:       Appearance: He is well-developed. HENT:      Head: Normocephalic and atraumatic. Right Ear: External ear normal. Tympanic membrane is not erythematous or bulging. Left Ear: External ear normal. Tympanic membrane is not erythematous or bulging. Nose: Nose normal.      Mouth/Throat:      Mouth: No oral lesions. Pharynx: No oropharyngeal exudate. Eyes:      General: No scleral icterus. Right eye: No discharge. Left eye: No discharge. Conjunctiva/sclera: Conjunctivae normal.   Neck:      Thyroid: No thyromegaly. Cardiovascular:      Rate and Rhythm: Normal rate and regular rhythm. Heart sounds: Normal heart sounds. No murmur heard. No friction rub. No gallop. Pulmonary:      Effort: Pulmonary effort is normal. No respiratory distress. Breath sounds: No wheezing or rales. Chest:      Chest wall: No tenderness. Abdominal:      General: Bowel sounds are normal. There is no distension. Palpations: Abdomen is soft. There is no mass. Tenderness: There is no abdominal tenderness. There is no guarding or rebound. Musculoskeletal:         General: No tenderness or deformity. Normal range of motion. Cervical back: Normal range of motion and neck supple. Lymphadenopathy:      Cervical: No cervical adenopathy. Skin:     General: Skin is warm and dry. Coloration: Skin is not pale. Findings: No erythema or rash. Neurological:      Mental Status: He is alert and oriented to person, place, and time. Cranial Nerves: No cranial nerve deficit. Motor: No abnormal muscle tone. Coordination: Coordination normal.      Deep Tendon Reflexes: Reflexes are normal and symmetric.    Psychiatric:         Behavior: Behavior normal.

## 2023-11-24 ENCOUNTER — APPOINTMENT (OUTPATIENT)
Dept: LAB | Facility: CLINIC | Age: 53
End: 2023-11-24
Payer: COMMERCIAL

## 2023-11-24 ENCOUNTER — APPOINTMENT (OUTPATIENT)
Dept: RADIOLOGY | Facility: CLINIC | Age: 53
End: 2023-11-24
Payer: COMMERCIAL

## 2023-11-24 DIAGNOSIS — Z12.5 SCREENING FOR PROSTATE CANCER: ICD-10-CM

## 2023-11-24 DIAGNOSIS — R07.89 ATYPICAL CHEST PAIN: ICD-10-CM

## 2023-11-24 LAB
ALBUMIN SERPL BCP-MCNC: 4.7 G/DL (ref 3.5–5)
ALP SERPL-CCNC: 56 U/L (ref 34–104)
ALT SERPL W P-5'-P-CCNC: 33 U/L (ref 7–52)
ANION GAP SERPL CALCULATED.3IONS-SCNC: 6 MMOL/L
AST SERPL W P-5'-P-CCNC: 24 U/L (ref 13–39)
BASOPHILS # BLD AUTO: 0.03 THOUSANDS/ÂΜL (ref 0–0.1)
BASOPHILS NFR BLD AUTO: 1 % (ref 0–1)
BILIRUB SERPL-MCNC: 0.98 MG/DL (ref 0.2–1)
BUN SERPL-MCNC: 14 MG/DL (ref 5–25)
CALCIUM SERPL-MCNC: 9.7 MG/DL (ref 8.4–10.2)
CHLORIDE SERPL-SCNC: 103 MMOL/L (ref 96–108)
CHOLEST SERPL-MCNC: 201 MG/DL
CO2 SERPL-SCNC: 32 MMOL/L (ref 21–32)
CREAT SERPL-MCNC: 0.97 MG/DL (ref 0.6–1.3)
EOSINOPHIL # BLD AUTO: 0.2 THOUSAND/ÂΜL (ref 0–0.61)
EOSINOPHIL NFR BLD AUTO: 3 % (ref 0–6)
ERYTHROCYTE [DISTWIDTH] IN BLOOD BY AUTOMATED COUNT: 13.5 % (ref 11.6–15.1)
EST. AVERAGE GLUCOSE BLD GHB EST-MCNC: 117 MG/DL
GFR SERPL CREATININE-BSD FRML MDRD: 87 ML/MIN/1.73SQ M
GLUCOSE P FAST SERPL-MCNC: 98 MG/DL (ref 65–99)
HBA1C MFR BLD: 5.7 %
HCT VFR BLD AUTO: 46.9 % (ref 36.5–49.3)
HDLC SERPL-MCNC: 63 MG/DL
HGB BLD-MCNC: 15.7 G/DL (ref 12–17)
IMM GRANULOCYTES # BLD AUTO: 0.02 THOUSAND/UL (ref 0–0.2)
IMM GRANULOCYTES NFR BLD AUTO: 0 % (ref 0–2)
LDLC SERPL CALC-MCNC: 110 MG/DL (ref 0–100)
LYMPHOCYTES # BLD AUTO: 1.72 THOUSANDS/ÂΜL (ref 0.6–4.47)
LYMPHOCYTES NFR BLD AUTO: 26 % (ref 14–44)
MCH RBC QN AUTO: 30.4 PG (ref 26.8–34.3)
MCHC RBC AUTO-ENTMCNC: 33.5 G/DL (ref 31.4–37.4)
MCV RBC AUTO: 91 FL (ref 82–98)
MONOCYTES # BLD AUTO: 0.46 THOUSAND/ÂΜL (ref 0.17–1.22)
MONOCYTES NFR BLD AUTO: 7 % (ref 4–12)
NEUTROPHILS # BLD AUTO: 4.21 THOUSANDS/ÂΜL (ref 1.85–7.62)
NEUTS SEG NFR BLD AUTO: 63 % (ref 43–75)
NRBC BLD AUTO-RTO: 0 /100 WBCS
PLATELET # BLD AUTO: 222 THOUSANDS/UL (ref 149–390)
PMV BLD AUTO: 10.2 FL (ref 8.9–12.7)
POTASSIUM SERPL-SCNC: 4.4 MMOL/L (ref 3.5–5.3)
PROT SERPL-MCNC: 8 G/DL (ref 6.4–8.4)
PSA SERPL-MCNC: 1.01 NG/ML (ref 0–4)
RBC # BLD AUTO: 5.16 MILLION/UL (ref 3.88–5.62)
SODIUM SERPL-SCNC: 141 MMOL/L (ref 135–147)
TRIGL SERPL-MCNC: 139 MG/DL
WBC # BLD AUTO: 6.64 THOUSAND/UL (ref 4.31–10.16)

## 2023-11-24 PROCEDURE — 36415 COLL VENOUS BLD VENIPUNCTURE: CPT

## 2023-11-24 PROCEDURE — 80061 LIPID PANEL: CPT | Performed by: FAMILY MEDICINE

## 2023-11-24 PROCEDURE — 71046 X-RAY EXAM CHEST 2 VIEWS: CPT

## 2023-11-24 PROCEDURE — 83036 HEMOGLOBIN GLYCOSYLATED A1C: CPT | Performed by: FAMILY MEDICINE

## 2023-11-24 PROCEDURE — 80053 COMPREHEN METABOLIC PANEL: CPT | Performed by: FAMILY MEDICINE

## 2023-11-24 PROCEDURE — G0103 PSA SCREENING: HCPCS

## 2023-11-24 PROCEDURE — 85025 COMPLETE CBC W/AUTO DIFF WBC: CPT | Performed by: FAMILY MEDICINE

## 2023-11-29 ENCOUNTER — HOSPITAL ENCOUNTER (OUTPATIENT)
Dept: NON INVASIVE DIAGNOSTICS | Facility: CLINIC | Age: 53
Discharge: HOME | End: 2023-11-29
Payer: COMMERCIAL

## 2023-11-29 VITALS
SYSTOLIC BLOOD PRESSURE: 110 MMHG | OXYGEN SATURATION: 98 % | BODY MASS INDEX: 22.53 KG/M2 | HEART RATE: 60 BPM | DIASTOLIC BLOOD PRESSURE: 70 MMHG | RESPIRATION RATE: 20 BRPM | HEIGHT: 73 IN | WEIGHT: 170 LBS

## 2023-11-29 DIAGNOSIS — R07.89 ATYPICAL CHEST PAIN: ICD-10-CM

## 2023-11-29 LAB
MAX HR PERCENT: 88 %
MAX HR: 146 BPM
RATE PRESSURE PRODUCT: NORMAL
SL CV STRESS RECOVERY BP: NORMAL MMHG
SL CV STRESS RECOVERY HR: 78 BPM
SL CV STRESS RECOVERY O2 SAT: 98 %
SL CV STRESS STAGE REACHED: 3
STRESS ANGINA INDEX: 0
STRESS BASELINE BP: NORMAL MMHG
STRESS BASELINE HR: 60 BPM
STRESS O2 SAT REST: 98 %
STRESS PEAK HR: 146 BPM
STRESS POST ESTIMATED WORKLOAD: 10.1 METS
STRESS POST EXERCISE DUR MIN: 8 MIN
STRESS POST EXERCISE DUR SEC: 15 SEC
STRESS POST O2 SAT PEAK: 99 %
STRESS POST PEAK BP: 170 MMHG

## 2023-11-29 PROCEDURE — 93018 CV STRESS TEST I&R ONLY: CPT | Performed by: INTERNAL MEDICINE

## 2023-11-29 PROCEDURE — 93017 CV STRESS TEST TRACING ONLY: CPT

## 2023-11-29 PROCEDURE — 93016 CV STRESS TEST SUPVJ ONLY: CPT | Performed by: INTERNAL MEDICINE

## 2023-11-30 LAB
MAX HR PERCENT: 85 %
MAX HR: 146 BPM
STRESS BASELINE BP: NORMAL MMHG
STRESS BASELINE HR: 60 BPM
STRESS POST ESTIMATED WORKLOAD: 10.1 METS
STRESS POST PEAK HR: 146 BPM
STRESS POST PEAK SYSTOLIC BP: 170 MMHG

## 2024-07-29 DIAGNOSIS — K21.9 GASTROESOPHAGEAL REFLUX DISEASE WITHOUT ESOPHAGITIS: ICD-10-CM

## 2024-07-29 NOTE — TELEPHONE ENCOUNTER
Reason for call:   [x] Refill   [] Prior Auth  [] Other:     Office:   [x] PCP/Provider -  Angus Weiss, DO   [] Specialty/Provider -     Medication: pantoprazole (PROTONIX) 20 mg tablet      Dose/Frequency:     20 mg, Oral, Daily PRN       Quantity: 30    Pharmacy: [id number] - [place] - [id number]     Does the patient have enough for 3 days?   [] Yes   [x] No - Send as HP to POD

## 2024-07-29 NOTE — TELEPHONE ENCOUNTER
pantoprazole (PROTONIX) 20 mg tablet          Sig: Take 1 tablet (20 mg total) by mouth daily as needed for heartburn    Disp: 30 tablet    Refills: 3    Start: 7/29/2024 - 8/28/2024    Class: Normal    For: Gastroesophageal reflux disease without esophagitis    Last ordered: 2 years ago (2/28/2022) by Angus Weiss DO    Gastroenterology: Proton Pump Inhibitors [id number]/29/2024 11:16 AM   Protocol Details Valid encounter within last 12 months    Patient is over 3 years old.   [employer] Emb[employer]ded [id number]/29/2024 11:16 AM   The requested medication is not on the active medication list.      To be filled at: [id number], [place] - [address]

## 2024-07-30 RX ORDER — PANTOPRAZOLE SODIUM 20 MG/1
20 TABLET, DELAYED RELEASE ORAL DAILY PRN
Qty: 90 TABLET | Refills: 3 | Status: SHIPPED | OUTPATIENT
Start: 2024-07-30 | End: 2025-07-25

## 2025-02-26 ENCOUNTER — OFFICE VISIT (OUTPATIENT)
Dept: URGENT CARE | Facility: CLINIC | Age: 55
End: 2025-02-26
Payer: COMMERCIAL

## 2025-02-26 VITALS
HEART RATE: 58 BPM | RESPIRATION RATE: 18 BRPM | SYSTOLIC BLOOD PRESSURE: 118 MMHG | OXYGEN SATURATION: 98 % | TEMPERATURE: 98.1 F | DIASTOLIC BLOOD PRESSURE: 64 MMHG

## 2025-02-26 DIAGNOSIS — H57.89 IRRITATION OF LEFT EYE: Primary | ICD-10-CM

## 2025-02-26 PROCEDURE — 99214 OFFICE O/P EST MOD 30 MIN: CPT

## 2025-02-26 RX ORDER — OFLOXACIN 3 MG/ML
2 SOLUTION/ DROPS OPHTHALMIC 4 TIMES DAILY
Qty: 5 ML | Refills: 0 | Status: SHIPPED | OUTPATIENT
Start: 2025-02-26 | End: 2025-03-03

## 2025-02-26 NOTE — PROGRESS NOTES
[facility]        NAME: Tay Correa is a 54 y.o. male  : 1970    MRN: [id number]  DATE: 2025  TIME: 10:48 AM    Assessment and Plan   Irritation of left eye [H57.89]  1. Irritation of left eye  ofloxacin (OCUFLOX) 0.3 % ophthalmic solution        Risks and benefits of performing fluorescein stain discussed at length. Patient provided verbal consent to procedure. Tetracaine drop applied to left eye.  Once adequate anesthesia was achieved, fluorescein strip was wet with second tetracaine drop and directly applied to the lower eyelid of the left eye. Patient was instructed to blink and left eye appeared orange in color, consistent with successful staining.  Direct visualization was achieved under black light lamp without abrasion.  Left eyelid everted without evidence of foreign body. Left eye was irrigated thoroughly with saline solution. Patient tolerated procedure well with no immediate complications.  Will start on ofloxacin drops. Instructed patient to follow-up with PCP/ophthalmologist for no improvement or worsening of symptoms.  Patient educated on red flag symptoms and when to proceed to the ED.  Patient agreeable and understands current treatment plan.        Patient Instructions     Patient Instructions   Please use eye drops daily as directed.     Corneal abrasions constitute a loss of the superficial epithelium of the cornea. They are generally painful because of the extensive innervation in the affected area. Healing is usually complete in 1-2 days unless the abrasions are deep, there is extensive epithelial loss, or there is underlying ocular disease (like Diabetes). Larger abrasions that involve more than half of the corneal surface may take 4-5 days to heal.     Contact lens wearing may be discontinued until the abrasion is healed    Artifical tears or gel may provide symptomatic relief  Tylenol/Ibuprofen for pain/discomfort  Avoid eye patching    Follow up with  Ophthalmology within 24 hours    Follow up with PCP in 3-5 days.  Proceed to the ER if symptoms worsen.        Follow up with PCP in 3-5 days.  Proceed to  ER if symptoms worsen.    Chief Complaint     Chief Complaint   Patient presents with   • Eye Pain     Left eye irritation 1 day          History of Present Illness       54-year-old male presents to the clinic for evaluation of left eye irritation x 2 days.  Patient reports he woke up and felt like something within his eye.  He states his symptoms have been gradually worsening.  He reports he has some mild pain when blinking.  He also reports some redness to the left eye.  He denies any other symptoms including eye discharge, itching or any visual disturbances.  He currently states he is a contact lens user however he does have glasses he wears as well.  He states he has been taking out his contacts every night and does not recall leaving his eye contacts in.  He denies taking any OTC medications for symptoms at this time.        Eye Pain   Associated symptoms include eye redness. Pertinent negatives include no eye discharge, fever, itching, nausea, photophobia or vomiting.       Review of Systems   Review of Systems   Constitutional:  Negative for chills and fever.   HENT:  Negative for congestion, ear pain and sore throat.    Eyes:  Positive for pain (pain when blinking) and redness. Negative for photophobia, discharge, itching and visual disturbance.   Respiratory:  Negative for cough and shortness of breath.    Cardiovascular:  Negative for chest pain.   Gastrointestinal:  Negative for diarrhea, nausea and vomiting.   Genitourinary:  Negative for decreased urine volume.   Musculoskeletal:  Negative for arthralgias and myalgias.   Neurological:  Negative for dizziness, light-headedness and headaches.         Current Medications       Current Outpatient Medications:   •  ofloxacin (OCUFLOX) 0.3 % ophthalmic solution, Administer 2 drops into the left eye 4  (four) times a day for 5 days, Disp: 5 mL, Rfl: 0  •  pantoprazole (PROTONIX) 20 mg tablet, Take 1 tablet (20 mg total) by mouth daily as needed for heartburn, Disp: 90 tablet, Rfl: 3  •  tadalafil (CIALIS) 20 MG tablet, Take 1 tablet (20 mg total) by mouth daily as needed for erectile dysfunction, Disp: 30 tablet, Rfl: 4  •  gabapentin (NEURONTIN) 100 mg capsule, Take 1 T PO tonight, then 1T PO BID tomorrow, then 1T PO TID thereafter. (Patient not taking: Reported on 11/21/2023), Disp: 90 capsule, Rfl: 1  •  naproxen (NAPROSYN) 375 mg tablet, Take 1 tablet (375 mg total) by mouth 2 (two) times a day with meals (Patient not taking: Reported on 11/21/2023), Disp: 30 tablet, Rfl: 0  •  sildenafil (VIAGRA) 100 mg tablet, Take 1 tablet (100 mg total) by mouth daily as needed for erectile dysfunction (Patient not taking: Reported on 11/21/2023), Disp: 9 tablet, Rfl: 2    Current Allergies     Allergies as of 02/26/2025 - Reviewed 02/26/2025   Allergen Reaction Noted   • Penicillins Anaphylaxis 01/14/2013            The following portions of the patient's history were reviewed and updated as appropriate: allergies, current medications, past family history, past medical history, past social history, past surgical history and problem list.     Past Medical History:   Diagnosis Date   • Atypical chest pain        Past Surgical History:   Procedure Laterality Date   • HERNIA REPAIR Right     RT Inguinal Hernia Repair. Last Assessed: 14Jan2013       Family History   Problem Relation Age of Onset   • Cancer Father         Bladder Cancer   • Colon cancer Maternal Grandmother    • Diabetes Paternal Grandmother    • Heart disease Paternal Grandfather    • Diabetes Paternal Grandfather          Medications have been verified.        Objective   /64   Pulse 58   Temp 98.1 °F (36.7 °C)   Resp 18   SpO2 98%        Physical Exam     Physical Exam  Vitals and nursing note reviewed.   Constitutional:       Appearance: Normal  appearance.   HENT:      Head: Normocephalic and atraumatic.   Eyes:      General: Lids are normal. Lids are everted, no foreign bodies appreciated. Vision grossly intact. Gaze aligned appropriately.         Right eye: No discharge.         Left eye: No foreign body, discharge or hordeolum.      Extraocular Movements: Extraocular movements intact.      Conjunctiva/sclera:      Right eye: Right conjunctiva is not injected. No chemosis, exudate or hemorrhage.     Left eye: Left conjunctiva is injected. No chemosis, exudate or hemorrhage.     Pupils: Pupils are equal, round, and reactive to light.   Cardiovascular:      Rate and Rhythm: Normal rate and regular rhythm.      Pulses: Normal pulses.      Heart sounds: Normal heart sounds.   Pulmonary:      Effort: Pulmonary effort is normal.      Breath sounds: Normal breath sounds.   Lymphadenopathy:      Cervical: No cervical adenopathy.   Skin:     General: Skin is warm and dry.   Neurological:      General: No focal deficit present.      Mental Status: He is alert.   Psychiatric:         Mood and Affect: Mood normal.         Behavior: Behavior normal.

## 2025-02-26 NOTE — PATIENT INSTRUCTIONS
Please use eye drops daily as directed.     Corneal abrasions constitute a loss of the superficial epithelium of the cornea. They are generally painful because of the extensive innervation in the affected area. Healing is usually complete in 1-2 days unless the abrasions are deep, there is extensive epithelial loss, or there is underlying ocular disease (like Diabetes). Larger abrasions that involve more than half of the corneal surface may take 4-5 days to heal.     Contact lens wearing may be discontinued until the abrasion is healed    Artifical tears or gel may provide symptomatic relief  Tylenol/Ibuprofen for pain/discomfort  Avoid eye patching    Follow up with Ophthalmology within 24 hours    Follow up with PCP in 3-5 days.  Proceed to the ER if symptoms worsen.

## 2025-06-03 ENCOUNTER — TELEPHONE (OUTPATIENT)
Age: 55
End: 2025-06-03

## 2025-06-03 NOTE — TELEPHONE ENCOUNTER
New Patient      Insurance   Current Insurance?[employer]   Insurance E-verified? Yes    History   Reason for appointment/active symptoms? NP Prostate Check      Has the patient had any previous Urologist(s)? Yes BAUDILIO 2022     Was the patient seen in the ED? No     [facility]     Records Requested? Yes  Records Visible in EPIC? Yes     Personal history of cancer? Yes - skin     Appointment   Office location preference:  [place]  ?   Appointment Details   Date:  6/13  Time:  10:00  Location:  [place]  Provider:  Kia  Does the appointment need further review? No

## 2025-06-11 NOTE — PROGRESS NOTES
6/13/2025      No chief complaint on file.      Assessment and Plan    54 y.o. male managed by Ap team     1. Screening for prostate cancer  Assessment & Plan:  PSA last in March 2023   Positive family hx of prostate cancer in uncle- mothers side   KYMBERLY- no firmness or nodules noted   Plan for PSA level in 2 weeks   If stable, plan to follow up in 1 year with PSA prior     Lab Results   Component Value Date    PSA 1.01 11/24/2023    PSA 1.0 03/30/2021    PSA 1.1 09/11/2019     Orders:  -     PSA, Total Screen; Future; Expected date: 06/27/2025 (Use expected date for collection)  -     PSA, Total Screen; Future; Expected date: 06/18/2026 (Use expected date for collection)  -     POCT urine dip auto non-scope  2. Erectile dysfunction of non-organic origin  Assessment & Plan:  Continues to take Cialis 20 mg as as needed   3. Benign prostatic hyperplasia without lower urinary tract symptoms  Assessment & Plan:   Patient reports being content with urination at this time  Currently on no  medication  Reports a good urinary stream  UA- negative for blood and infection   Reviewed adequate water intake and bladder irritants  Were to call for any issues with urination  Plan follow-up in 1 year      History of Present Illness  Tay Correa is a 54 y.o. male here for evaluation of prostate cancer screening, BPH and ED.  PSA level last from 2023 PSA stable at that time.  He does have a positive family history of prostate cancer with his uncle on his mother side.  No recent PSA level on file.  He is currently on no  medication.  He reports being content with urination.  Sometimes his frequency is worse than others.  He does drink coffee during the day.  He denies flank pain or gross hematuria.  He is on 20 mg Cialis as needed for ED.  He reports not frequently needing the medication.  No other concerns at this time.        Review of Systems   Constitutional:  Negative for chills and fever.   HENT:  Negative for ear pain and  "sore throat.    Eyes:  Negative for pain and visual disturbance.   Respiratory:  Negative for cough and shortness of breath.    Cardiovascular:  Negative for chest pain and palpitations.   Gastrointestinal:  Negative for abdominal pain and vomiting.   Genitourinary:  Negative for decreased urine volume, difficulty urinating, dysuria, flank pain, frequency, hematuria and urgency.   Musculoskeletal:  Negative for arthralgias and back pain.   Skin:  Negative for color change and rash.   Neurological:  Negative for seizures and syncope.   All other systems reviewed and are negative.               Vitals  Vitals:    06/13/25 1014   BP: 112/74   BP Location: Left arm   Patient Position: Sitting   Cuff Size: Standard   Pulse: 57   SpO2: 95%   Weight: 76.2 kg (168 lb)   Height: 6' 1\" (1.854 m)       Physical Exam  Vitals reviewed.   Constitutional:       Appearance: Normal appearance.   HENT:      Head: Normocephalic and atraumatic.     Eyes:      Conjunctiva/sclera: Conjunctivae normal.     Pulmonary:      Effort: Pulmonary effort is normal.   Abdominal:      General: Abdomen is flat. There is no distension.      Palpations: Abdomen is soft.      Tenderness: There is no abdominal tenderness.   Genitourinary:     Penis: Normal.       Testes: Normal.      Prostate: Normal. Not enlarged, not tender and no nodules present.     Musculoskeletal:         General: Normal range of motion.      Cervical back: Normal range of motion.     Skin:     General: Skin is warm and dry.     Neurological:      General: No focal deficit present.      Mental Status: He is alert and oriented to person, place, and time.     Psychiatric:         Mood and Affect: Mood normal.         Behavior: Behavior normal.         Thought Content: Thought content normal.         Judgment: Judgment normal.           Past History  Past Medical History[1]  Social History[2]  Tobacco Use History[3]  Family History[4]    The following portions of the patient's " history were reviewed and updated as appropriate: allergies, current medications, past medical history, past social history, past surgical history and problem list.    Results  Recent Results (from the past hour)   POCT urine dip auto non-scope    Collection Time: 25 10:42 AM   Result Value Ref Range     COLOR,UA yellow     CLARITY,UA clear     SPECIFIC GRAVITY,UA 1.025      PH,UA 5.5     LEUKOCYTE ESTERASE,UA neg     NITRITE,UA neg     GLUCOSE, UA neg     KETONES,UA neg     BILIRUBIN,UA neg     BLOOD,UA neg     POCT URINE PROTEIN neg     SL AMB POCT UROBILINOGEN 0.2    ]  Lab Results   Component Value Date    PSA 1.01 2023    PSA 1.0 2021    PSA 1.1 2019    PSA 2.3 2018     Lab Results   Component Value Date    GLUCOSE 93 10/31/2014    CALCIUM 9.1 2024     10/31/2014    K 4.2 2024    CO2 25 2024     2024    BUN 16 2024    CREATININE 0.94 2024     Lab Results   Component Value Date    WBC 6.64 2023    HGB 15.7 2023    HCT 46.9 2023    MCV 91 2023     2023              [1]  Past Medical History:  Diagnosis Date   • Atypical chest pain    [2]  Social History  Socioeconomic History   • Marital status: /Civil Union   Tobacco Use   • Smoking status: Former     Current packs/day: 0.00     Average packs/day: 1.5 packs/day for 12.0 years (18.0 ttl pk-yrs)     Types: Cigarettes     Start date: 1984     Quit date: 3/1/1996     Years since quittin.3   • Smokeless tobacco: Never   Vaping Use   • Vaping status: Never Used   Substance and Sexual Activity   • Alcohol use: Yes     Alcohol/week: 2.0 standard drinks of alcohol     Types: 2 Glasses of wine per week     Comment: occasional   • Drug use: No   • Sexual activity: Yes     Partners: Female     Birth control/protection: None   Social History Narrative    Per [employer], Marital Status:  as of 2013    Caffeine- 8 cups per day    Full  time-    [3]  Social History  Tobacco Use   Smoking Status Former   • Current packs/day: 0.00   • Average packs/day: 1.5 packs/day for 12.0 years (18.0 ttl pk-yrs)   • Types: Cigarettes   • Start date: 1984   • Quit date: 3/1/1996   • Years since quittin.3   Smokeless Tobacco Never   [4]  Family History  Problem Relation Name Age of Onset   • Cancer Father Obed Correa         Bladder Cancer   • Colon cancer Maternal Grandmother Lala Harris    • Diabetes Paternal Grandmother Charito Sunny    • Heart disease Paternal Grandfather Corky Sunny    • Diabetes Paternal Grandfather Corky Correa

## 2025-06-13 ENCOUNTER — OFFICE VISIT (OUTPATIENT)
Dept: UROLOGY | Facility: MEDICAL CENTER | Age: 55
End: 2025-06-13
Payer: COMMERCIAL

## 2025-06-13 ENCOUNTER — RESULTS FOLLOW-UP (OUTPATIENT)
Dept: UROLOGY | Facility: MEDICAL CENTER | Age: 55
End: 2025-06-13

## 2025-06-13 VITALS
WEIGHT: 168 LBS | BODY MASS INDEX: 22.26 KG/M2 | HEART RATE: 57 BPM | HEIGHT: 73 IN | SYSTOLIC BLOOD PRESSURE: 112 MMHG | DIASTOLIC BLOOD PRESSURE: 74 MMHG | OXYGEN SATURATION: 95 %

## 2025-06-13 DIAGNOSIS — N40.0 BENIGN PROSTATIC HYPERPLASIA WITHOUT LOWER URINARY TRACT SYMPTOMS: ICD-10-CM

## 2025-06-13 DIAGNOSIS — Z12.5 SCREENING FOR PROSTATE CANCER: Primary | ICD-10-CM

## 2025-06-13 DIAGNOSIS — F52.21 ERECTILE DYSFUNCTION OF NON-ORGANIC ORIGIN: ICD-10-CM

## 2025-06-13 LAB
SL AMB  POCT GLUCOSE, UA: NORMAL
SL AMB LEUKOCYTE ESTERASE,UA: NORMAL
SL AMB POCT BILIRUBIN,UA: NORMAL
SL AMB POCT BLOOD,UA: NORMAL
SL AMB POCT CLARITY,UA: CLEAR
SL AMB POCT COLOR,UA: YELLOW
SL AMB POCT KETONES,UA: NORMAL
SL AMB POCT NITRITE,UA: NORMAL
SL AMB POCT PH,UA: 5.5
SL AMB POCT SPECIFIC GRAVITY,UA: 1.02
SL AMB POCT URINE PROTEIN: NORMAL
SL AMB POCT UROBILINOGEN: 0.2

## 2025-06-13 PROCEDURE — 99203 OFFICE O/P NEW LOW 30 MIN: CPT

## 2025-06-13 PROCEDURE — 81003 URINALYSIS AUTO W/O SCOPE: CPT

## 2025-06-13 NOTE — ASSESSMENT & PLAN NOTE
PSA last in March 2023   Positive family hx of prostate cancer in uncle- mothers side   KYMBERLY- no firmness or nodules noted   Plan for PSA level in 2 weeks   If stable, plan to follow up in 1 year with PSA prior     Lab Results   Component Value Date    PSA 1.01 11/24/2023    PSA 1.0 03/30/2021    PSA 1.1 09/11/2019

## 2025-06-13 NOTE — ASSESSMENT & PLAN NOTE
Patient reports being content with urination at this time  Currently on no  medication  Reports a good urinary stream  UA- negative for blood and infection   Reviewed adequate water intake and bladder irritants  Were to call for any issues with urination  Plan follow-up in 1 year

## 2025-07-13 PROBLEM — Z12.5 SCREENING FOR PROSTATE CANCER: Status: RESOLVED | Noted: 2022-02-15 | Resolved: 2025-07-13
